# Patient Record
Sex: MALE | Race: WHITE | Employment: FULL TIME | URBAN - METROPOLITAN AREA
[De-identification: names, ages, dates, MRNs, and addresses within clinical notes are randomized per-mention and may not be internally consistent; named-entity substitution may affect disease eponyms.]

---

## 2017-04-27 ENCOUNTER — OFFICE VISIT (OUTPATIENT)
Dept: FAMILY MEDICINE | Facility: CLINIC | Age: 27
End: 2017-04-27
Payer: COMMERCIAL

## 2017-04-27 VITALS
SYSTOLIC BLOOD PRESSURE: 136 MMHG | TEMPERATURE: 100.5 F | HEIGHT: 68 IN | BODY MASS INDEX: 39.86 KG/M2 | HEART RATE: 90 BPM | OXYGEN SATURATION: 96 % | WEIGHT: 263 LBS | DIASTOLIC BLOOD PRESSURE: 85 MMHG

## 2017-04-27 DIAGNOSIS — L05.91 INFECTED PILONIDAL CYST: Primary | ICD-10-CM

## 2017-04-27 PROCEDURE — 99213 OFFICE O/P EST LOW 20 MIN: CPT | Performed by: FAMILY MEDICINE

## 2017-04-27 RX ORDER — HYDROCODONE BITARTRATE AND ACETAMINOPHEN 5; 325 MG/1; MG/1
1-2 TABLET ORAL EVERY 8 HOURS PRN
Qty: 20 TABLET | Refills: 0 | Status: SHIPPED | OUTPATIENT
Start: 2017-04-27 | End: 2017-09-21

## 2017-04-27 RX ORDER — CEPHALEXIN 500 MG/1
500 CAPSULE ORAL 4 TIMES DAILY
Qty: 40 CAPSULE | Refills: 0 | Status: SHIPPED | OUTPATIENT
Start: 2017-04-27 | End: 2017-09-21

## 2017-04-27 NOTE — LETTER
[address] [address]  Phone: [phone]    April 27, 2017        Tawanda MEEK Richard  [address] [facility] [address]          To whom it may concern:    RE: Twaanda MEEK [place]    Patient was seen and treated today at our clinic and missed work.  Patient may return to work 5/1/2017 with the following:  No working or lifting restrictions    Please contact me for questions or concerns.      Sincerely,        Prateek Ford MD

## 2017-04-27 NOTE — MR AVS SNAPSHOT
After Visit Summary   4/27/2017    Tawanda Ramos    MRN: [id number]           Patient Information     Date Of Birth          1990        Visit Information        Provider Department      4/27/2017 5:00 PM Prateek Ford MD [facility]        Today's Diagnoses     Infected pilonidal cyst    -  1       Follow-ups after your visit        Additional Services     GENERAL SURG ADULT REFERRAL       Your provider has referred you to: G: [facility] [phone]   [email or web address]    Please be aware that coverage of these services is subject to the terms and limitations of your health insurance plan.  Call member services at your health plan with any benefit or coverage questions.      Please bring the following with you to your appointment:    (1) Any X-Rays, CTs or MRIs which have been performed.  Contact the facility where they were done to arrange for  prior to your scheduled appointment.   (2) List of current medications   (3) This referral request   (4) Any documents/labs given to you for this referral                  Who to contact     If you have questions or need follow up information about today's clinic visit or your schedule please contact [facility] directly at [phone].  Normal or non-critical lab and imaging results will be communicated to you by MyChart, letter or phone within 4 business days after the clinic has received the results. If you do not hear from us within 7 days, please contact the clinic through MyChart or phone. If you have a critical or abnormal lab result, we will notify you by phone as soon as possible.  Submit refill requests through [employer] or call your pharmacy and they will forward the refill request to us. Please allow 3 business days for your refill to be completed.          Additional Information About Your Visit        MyChart Information      "[employer] lets you send messages to your doctor, view your test results, renew your prescriptions, schedule appointments and more. To sign up, go to [email or web address] . Click on \"Log in\" on the left side of the screen, which will take you to the Welcome page. Then click on \"Sign up Now\" on the right side of the page.     You will be asked to enter the access code listed below, as well as some personal information. Please follow the directions to create your username and password.     Your access code is: [email or web address]  Expires: 2017  5:28 PM     Your access code will  in 90 days. If you need help or a new code, please call your [place] clinic or [phone].        Care EveryWhere ID     This is your Care EveryWhere ID. This could be used by other organizations to access your [place] medical records  [id number]        Your Vitals Were     Pulse Temperature Height Pulse Oximetry BMI (Body Mass Index)       90 100.5  F (38.1  C) (Oral) 5' 8.11\" (1.73 m) 96% 39.86 kg/m2        Blood Pressure from Last 3 Encounters:   17 136/85   16 127/77   16 144/84    Weight from Last 3 Encounters:   17 263 lb (119.3 kg)   16 237 lb 8 oz (107.7 kg)   16 242 lb (109.8 kg)              We Performed the Following     GENERAL SURG ADULT REFERRAL          Today's Medication Changes          These changes are accurate as of: 17  5:28 PM.  If you have any questions, ask your nurse or doctor.               Start taking these medicines.        Dose/Directions    cephALEXin 500 MG capsule   Commonly known as:  KEFLEX   Used for:  Infected pilonidal cyst   Started by:  Prateek Ford MD        Dose:  500 mg   Take 1 capsule (500 mg) by mouth 4 times daily   Quantity:  40 capsule   Refills:  0            Where to get your medicines      These medications were sent to [facility] - [place], [place] - [address]  [address], [place] " [facility] [address]     Phone:  [phone]     cephALEXin 500 MG capsule                Primary Care Provider Office Phone # Fax #    Prateek Ford [phone]       [facility] [address]  [facility] [address]        Thank you!     Thank you for choosing [facility]  for your care. Our goal is always to provide you with excellent care. Hearing back from our patients is one way we can continue to improve our services. Please take a few minutes to complete the written survey that you may receive in the mail after your visit with us. Thank you!             Your Updated Medication List - Protect others around you: Learn how to safely use, store and throw away your medicines at www.disposemymeds.org.          This list is accurate as of: 4/27/17  5:28 PM.  Always use your most recent med list.                   Brand Name Dispense Instructions for use    cephALEXin 500 MG capsule    KEFLEX    40 capsule    Take 1 capsule (500 mg) by mouth 4 times daily       FLUoxetine 40 MG capsule    PROzac    90 capsule    Take 1 capsule (40 mg) by mouth daily       lisinopril 10 MG tablet    PRINIVIL/ZESTRIL    90 tablet    Take 1 tablet (10 mg) by mouth daily

## 2017-04-27 NOTE — PROGRESS NOTES
"  SUBJECTIVE:                                                    Tawanda Ramos is a 26 year old male who presents to clinic today for the following health issues:      Cyst on Tailbone    Present for 2 days 5/1/2017  No drainage   No previous history of same     O: /85 (BP Location: Right arm, Patient Position: Chair, Cuff Size: Adult Regular)  Pulse 90  Temp 100.5  F (38.1  C) (Oral)  Ht 5' 8.11\" (1.73 m)  Wt 263 lb (119.3 kg)  SpO2 96%  BMI 39.86 kg/m2    Patient has an area of redness and tenderness over the proximal intergluteal cleft   No drainage     Not fluctuant   Feels about 2 cm deep     Measures  2x3 cm     (L05.91) Infected pilonidal cyst  (primary encounter diagnosis)  Comment:   Plan: cephALEXin (KEFLEX) 500 MG capsule, GENERAL         SURG ADULT REFERRAL, HYDROcodone-acetaminophen         (NORCO) 5-325 MG per tablet        Follow up if starts to drain  We could I+D this if it became fluctuant or started to drain.  If not gone refer to General surgery           "

## 2017-04-27 NOTE — NURSING NOTE
"Chief Complaint   Patient presents with     Derm Problem     Cyst on [facility]     PHQ-9       Initial /85 (BP Location: Right arm, Patient Position: Chair, Cuff Size: Adult Regular)  Pulse 90  Temp 100.5  F (38.1  C) (Oral)  Ht 5' 8.11\" (1.73 m)  Wt 263 lb (119.3 kg)  SpO2 96%  BMI 39.86 kg/m2 Estimated body mass index is 39.86 kg/(m^2) as calculated from the following:    Height as of this encounter: 5' 8.11\" (1.73 m).    Weight as of this encounter: 263 lb (119.3 kg).  Medication Reconciliation: complete   Tati See RUPALI Graves      "

## 2017-04-28 ASSESSMENT — PATIENT HEALTH QUESTIONNAIRE - PHQ9: SUM OF ALL RESPONSES TO PHQ QUESTIONS 1-9: 3

## 2017-09-21 ENCOUNTER — OFFICE VISIT (OUTPATIENT)
Dept: FAMILY MEDICINE | Facility: CLINIC | Age: 27
End: 2017-09-21
Payer: COMMERCIAL

## 2017-09-21 VITALS
WEIGHT: 275 LBS | DIASTOLIC BLOOD PRESSURE: 93 MMHG | BODY MASS INDEX: 41.68 KG/M2 | OXYGEN SATURATION: 97 % | HEART RATE: 86 BPM | TEMPERATURE: 98.5 F | SYSTOLIC BLOOD PRESSURE: 155 MMHG

## 2017-09-21 DIAGNOSIS — L05.91 INFECTED PILONIDAL CYST: ICD-10-CM

## 2017-09-21 DIAGNOSIS — I10 HYPERTENSION GOAL BP (BLOOD PRESSURE) < 140/90: ICD-10-CM

## 2017-09-21 PROCEDURE — 99213 OFFICE O/P EST LOW 20 MIN: CPT | Performed by: FAMILY MEDICINE

## 2017-09-21 RX ORDER — LISINOPRIL 10 MG/1
10 TABLET ORAL DAILY
Qty: 90 TABLET | Refills: 3 | Status: SHIPPED | OUTPATIENT
Start: 2017-09-21 | End: 2018-01-11

## 2017-09-21 RX ORDER — HYDROCODONE BITARTRATE AND ACETAMINOPHEN 5; 325 MG/1; MG/1
1-2 TABLET ORAL EVERY 8 HOURS PRN
Qty: 30 TABLET | Refills: 0 | Status: SHIPPED | OUTPATIENT
Start: 2017-09-21 | End: 2017-11-20

## 2017-09-21 RX ORDER — CEPHALEXIN 500 MG/1
500 CAPSULE ORAL 4 TIMES DAILY
Qty: 40 CAPSULE | Refills: 0 | Status: SHIPPED | OUTPATIENT
Start: 2017-09-21 | End: 2018-04-09

## 2017-09-21 NOTE — LETTER
[address] [address]  Phone: [phone]  Fax: [phone]    September 21, 2017        Tawanda Ramos  [address] [place] [address]          To whom it may concern:    RE: Tawanda Ramos    Patient was seen and treated today at our clinic.    Please contact me for questions or concerns.      Sincerely,        Prateek Ford MD

## 2017-09-21 NOTE — MR AVS SNAPSHOT
After Visit Summary   9/21/2017    Tawanda Ramos    MRN: [id number]           Patient Information     Date Of Birth          1990        Visit Information        Provider Department      9/21/2017 2:20 PM Prateek Ford MD [facility]        Today's Diagnoses     Infected pilonidal cyst        Hypertension goal BP (blood pressure) < 140/90          Care Instructions      Pilonidal Cyst, Infected (Antibiotic Treatment)  A pilonidal cyst is a swelling that starts under the skin on the sacrum near the tailbone. It may look like a small dimple. It can fill with skin oils, hair, and dead skin cells. It may stay small or grow larger. It may become infected with normal skin bacteria because it often has an opening to the surface.  Causes  The cause of pilonidal cysts has been debated since they were first recognized. A cyst may be present at birth and go unnoticed. Injury, rubbing, or skin irritation may also cause pilonidal cysts. It can also be caused by an ingrown hair. The cause is most likely a combination of these things. Because some injury or irritation can lead to pilonidal cysts, they can be more common in people who sit or drive a lot for work.  Symptoms  A pilonidal cyst may be small and painless. If it becomes inflamed or infected, you may have these symptoms:    Swelling    Irritation or redness    Pain    Drainage  The cyst can swell and drain on its own. The swelling and drainage can come and go.  Treatment  A limited infection can be treated with antibiotics and home care. You have been given antibiotics to treat your infected pilonidal cyst.  Home care  The following guidelines will help you care for your wound at home:    Sit in a tub filled with about 6 inches of hot water. Keep the water hot for 10 to 15 minutes.    Don't squeeze the pilonidal cyst or stick a needle in it to drain it. This will make the infection worse, or spread it.    Cover the cyst  with a pad or something similar to keep it from becoming more irritated, damaged, and painful.  Medicines    Take acetaminophen or ibuprofen for pain, unless you were given a different pain medicine to use. Talk with your doctor before using these medicines if you have chronic liver or kidney disease, or have ever had a stomach ulcer or digestive bleeding. Also talk with your doctor if you are taking blood thinner medicines.    Take the antibiotics that you were prescribed until they are all gone. To make sure the infection is cured, it is important to finish the antibiotics even if the wound looks better.    Use antibiotic cream or ointment if your healthcare provider tells you to.    Preventing future infections  Once this infection has healed, follow these tips to lower the risk for another infection:    Keep the area of the cyst clean by bathing or showering every day.    Don't wear tight-fitting clothing. This will help lessen sweat and irritation of the skin.    You may need surgery to completely remove the cyst if it keeps coming back. The surgery can only be done when the cyst is not infected. Ask your doctor for more information.    Watch for signs of infection listed below so that treatment may be started early.  Follow-up care  Follow up with your healthcare provider, or as advised. Check your wound every day for the signs listed below.  When to seek medical advice  Call your healthcare provider right away if any of these occur:    Pus coming from the cyst    Increasing local pain, redness, or swelling    Fever of 100.4 F (38.0 C) or higher for more than 2 days, or as directed by your healthcare provider  Date Last Reviewed: 12/1/2016 2000-2017 [employer]. [address] [place] [address], [place] [address]. All rights reserved. This information is not intended as a substitute for professional medical care. Always follow your healthcare professional's instructions.                Follow-ups after  "your visit        Who to contact     If you have questions or need follow up information about today's clinic visit or your schedule please contact [facility] directly at [phone].  Normal or non-critical lab and imaging results will be communicated to you by MyChart, letter or phone within 4 business days after the clinic has received the results. If you do not hear from us within 7 days, please contact the clinic through MyChart or phone. If you have a critical or abnormal lab result, we will notify you by phone as soon as possible.  Submit refill requests through [employer] or call your pharmacy and they will forward the refill request to us. Please allow 3 business days for your refill to be completed.          Additional Information About Your Visit        [facility] Information     [employer] lets you send messages to your doctor, view your test results, renew your prescriptions, schedule appointments and more. To sign up, go to [email or web address] . Click on \"Log in\" on the left side of the screen, which will take you to the Welcome page. Then click on \"Sign up Now\" on the right side of the page.     You will be asked to enter the access code listed below, as well as some personal information. Please follow the directions to create your username and password.     Your access code is: [email or web address]  Expires: 2017  2:45 PM     Your access code will  in 90 days. If you need help or a new code, please call your [place] clinic or [phone].        Care EveryWhere ID     This is your Care EveryWhere ID. This could be used by other organizations to access your [place] medical records  [id number]        Your Vitals Were     Pulse Temperature Pulse Oximetry BMI (Body Mass Index)          86 98.5  F (36.9  C) (Oral) 97% 41.68 kg/m2         Blood Pressure from Last 3 Encounters:   17 (!) 155/93   17 136/85   16 127/77    Weight from Last 3 Encounters:   17 " 275 lb (124.7 kg)   04/27/17 263 lb (119.3 kg)   04/11/16 237 lb 8 oz (107.7 kg)              Today, you had the following     No orders found for display         Today's Medication Changes          These changes are accurate as of: 9/21/17  2:45 PM.  If you have any questions, ask your nurse or doctor.               Stop taking these medicines if you haven't already. Please contact your care team if you have questions.     FLUoxetine 40 MG capsule   Commonly known as:  PROzac   Stopped by:  Prateek Ford MD                Where to get your medicines      These medications were sent to [facility] - [place], [place] - [address]  [address], [facility] [address]     Phone:  [phone]     cephALEXin 500 MG capsule    lisinopril 10 MG tablet         Some of these will need a paper prescription and others can be bought over the counter.  Ask your nurse if you have questions.     Bring a paper prescription for each of these medications     HYDROcodone-acetaminophen 5-325 MG per tablet                Primary Care Provider Office Phone # Fax #    Prateek Ford [phone]       [address]  [facility] [place] [place] [address]        Equal Access to Services     CONCHITA LIU AH: Hadii barrett smith hadasho Soomaali, waaxda luqadaha, qaybta kaalmada adeegyada, quique oquendo. So [facility] [phone].    ATENCIÓN: Si habla español, tiene a mckeon disposición servicios gratuitos de asistencia lingüística. Llame al [phone].    We comply with applicable federal civil rights laws and [place] laws. We do not discriminate on the basis of race, color, national origin, age, disability sex, sexual orientation or gender identity.            Thank you!     Thank you for choosing [facility]  for your care. Our goal is always to provide you with excellent care. Hearing back from our patients is one way we can  continue to improve our services. Please take a few minutes to complete the written survey that you may receive in the mail after your visit with us. Thank you!             Your Updated Medication List - Protect others around you: Learn how to safely use, store and throw away your medicines at www.disposemymeds.org.          This list is accurate as of: 9/21/17  2:45 PM.  Always use your most recent med list.                   Brand Name Dispense Instructions for use Diagnosis    cephALEXin 500 MG capsule    KEFLEX    40 capsule    Take 1 capsule (500 mg) by mouth 4 times daily    Infected pilonidal cyst       HYDROcodone-acetaminophen 5-325 MG per tablet    NORCO    30 tablet    Take 1-2 tablets by mouth every 8 hours as needed for moderate to severe pain maximum 6 tablet(s) per day    Infected pilonidal cyst       lisinopril 10 MG tablet    PRINIVIL/ZESTRIL    90 tablet    Take 1 tablet (10 mg) by mouth daily    Hypertension goal BP (blood pressure) < 140/90

## 2017-09-21 NOTE — NURSING NOTE
"Chief Complaint   Patient presents with     Derm Problem     Cyst on tailbone came back from 4/27/17       Initial BP (!) 155/93  Pulse 86  Temp 98.5  F (36.9  C) (Oral)  Wt 275 lb (124.7 kg)  SpO2 97%  BMI 41.68 kg/m2 Estimated body mass index is 41.68 kg/(m^2) as calculated from the following:    Height as of 4/27/17: 5' 8.11\" (1.73 m).    Weight as of this encounter: 275 lb (124.7 kg).  Medication Reconciliation: complete   Deborah Mosqueda MA      "

## 2017-09-21 NOTE — PROGRESS NOTES
SUBJECTIVE:   Tawanda Ramos is a 26 year old male who presents to clinic today for the following health issues:      Cyst on tailbone came back from 4/27/17    Patient has not had a fever   He has had a little drainage     It is painful   He had started to noticed it less than a week ago       O: BP (!) 155/93  Pulse 86  Temp 98.5  F (36.9  C) (Oral)  Wt 275 lb (124.7 kg)  SpO2 97%  BMI 41.68 kg/m2     Pain at the intergluteal cleft   Swelling proximally   No drainage       ICD-10-CM    1. Infected pilonidal cyst L05.91 HYDROcodone-acetaminophen (NORCO) 5-325 MG per tablet     cephALEXin (KEFLEX) 500 MG capsule   2. Hypertension goal BP (blood pressure) < 140/90 I10 lisinopril (PRINIVIL/ZESTRIL) 10 MG tablet

## 2017-09-21 NOTE — PATIENT INSTRUCTIONS
Pilonidal Cyst, Infected (Antibiotic Treatment)  A pilonidal cyst is a swelling that starts under the skin on the sacrum near the tailbone. It may look like a small dimple. It can fill with skin oils, hair, and dead skin cells. It may stay small or grow larger. It may become infected with normal skin bacteria because it often has an opening to the surface.  Causes  The cause of pilonidal cysts has been debated since they were first recognized. A cyst may be present at birth and go unnoticed. Injury, rubbing, or skin irritation may also cause pilonidal cysts. It can also be caused by an ingrown hair. The cause is most likely a combination of these things. Because some injury or irritation can lead to pilonidal cysts, they can be more common in people who sit or drive a lot for work.  Symptoms  A pilonidal cyst may be small and painless. If it becomes inflamed or infected, you may have these symptoms:    Swelling    Irritation or redness    Pain    Drainage  The cyst can swell and drain on its own. The swelling and drainage can come and go.  Treatment  A limited infection can be treated with antibiotics and home care. You have been given antibiotics to treat your infected pilonidal cyst.  Home care  The following guidelines will help you care for your wound at home:    Sit in a tub filled with about 6 inches of hot water. Keep the water hot for 10 to 15 minutes.    Don't squeeze the pilonidal cyst or stick a needle in it to drain it. This will make the infection worse, or spread it.    Cover the cyst with a pad or something similar to keep it from becoming more irritated, damaged, and painful.  Medicines    Take acetaminophen or ibuprofen for pain, unless you were given a different pain medicine to use. Talk with your doctor before using these medicines if you have chronic liver or kidney disease, or have ever had a stomach ulcer or digestive bleeding. Also talk with your doctor if you are taking blood thinner  medicines.    Take the antibiotics that you were prescribed until they are all gone. To make sure the infection is cured, it is important to finish the antibiotics even if the wound looks better.    Use antibiotic cream or ointment if your healthcare provider tells you to.    Preventing future infections  Once this infection has healed, follow these tips to lower the risk for another infection:    Keep the area of the cyst clean by bathing or showering every day.    Don't wear tight-fitting clothing. This will help lessen sweat and irritation of the skin.    You may need surgery to completely remove the cyst if it keeps coming back. The surgery can only be done when the cyst is not infected. Ask your doctor for more information.    Watch for signs of infection listed below so that treatment may be started early.  Follow-up care  Follow up with your healthcare provider, or as advised. Check your wound every day for the signs listed below.  When to seek medical advice  Call your healthcare provider right away if any of these occur:    Pus coming from the cyst    Increasing local pain, redness, or swelling    Fever of 100.4 F (38.0 C) or higher for more than 2 days, or as directed by your healthcare provider  Date Last Reviewed: 12/1/2016 2000-2017 [employer]. [address] [place] [address], [place] [address]. All rights reserved. This information is not intended as a substitute for professional medical care. Always follow your healthcare professional's instructions.

## 2017-11-20 ENCOUNTER — OFFICE VISIT (OUTPATIENT)
Dept: FAMILY MEDICINE | Facility: CLINIC | Age: 27
End: 2017-11-20
Payer: COMMERCIAL

## 2017-11-20 VITALS
DIASTOLIC BLOOD PRESSURE: 94 MMHG | HEART RATE: 104 BPM | BODY MASS INDEX: 40.16 KG/M2 | WEIGHT: 265 LBS | SYSTOLIC BLOOD PRESSURE: 155 MMHG | TEMPERATURE: 98.4 F | OXYGEN SATURATION: 99 %

## 2017-11-20 DIAGNOSIS — F41.1 GAD (GENERALIZED ANXIETY DISORDER): Primary | ICD-10-CM

## 2017-11-20 PROCEDURE — 99213 OFFICE O/P EST LOW 20 MIN: CPT | Performed by: FAMILY MEDICINE

## 2017-11-20 RX ORDER — ALPRAZOLAM 0.25 MG
0.25 TABLET ORAL 3 TIMES DAILY PRN
Qty: 30 TABLET | Refills: 0 | Status: SHIPPED | OUTPATIENT
Start: 2017-11-20 | End: 2017-11-29

## 2017-11-20 ASSESSMENT — ANXIETY QUESTIONNAIRES
6. BECOMING EASILY ANNOYED OR IRRITABLE: SEVERAL DAYS
2. NOT BEING ABLE TO STOP OR CONTROL WORRYING: SEVERAL DAYS
GAD7 TOTAL SCORE: 9
1. FEELING NERVOUS, ANXIOUS, OR ON EDGE: MORE THAN HALF THE DAYS
3. WORRYING TOO MUCH ABOUT DIFFERENT THINGS: MORE THAN HALF THE DAYS
IF YOU CHECKED OFF ANY PROBLEMS ON THIS QUESTIONNAIRE, HOW DIFFICULT HAVE THESE PROBLEMS MADE IT FOR YOU TO DO YOUR WORK, TAKE CARE OF THINGS AT HOME, OR GET ALONG WITH OTHER PEOPLE: VERY DIFFICULT
7. FEELING AFRAID AS IF SOMETHING AWFUL MIGHT HAPPEN: NOT AT ALL
5. BEING SO RESTLESS THAT IT IS HARD TO SIT STILL: SEVERAL DAYS

## 2017-11-20 ASSESSMENT — PATIENT HEALTH QUESTIONNAIRE - PHQ9
5. POOR APPETITE OR OVEREATING: MORE THAN HALF THE DAYS
SUM OF ALL RESPONSES TO PHQ QUESTIONS 1-9: 5

## 2017-11-20 NOTE — MR AVS SNAPSHOT
After Visit Summary   11/20/2017    Tawanda Ramos    MRN: [id number]           Patient Information     Date Of Birth          1990        Visit Information        Provider Department      11/20/2017 3:20 PM Prateek Ford MD [facility]        Today's Diagnoses     SHIVANI (generalized anxiety disorder)    -  1       Follow-ups after your visit        Additional Services     MENTAL HEALTH REFERRAL  - Adult; Outpatient Treatment; Individual/Couples/Family/Group Therapy/Health Psychology; [facility]: [facility] [phone]; The scheduling team will contact you to schedule your appointment.  If you have any ...       All scheduling is subject to the client's specific insurance plan & benefits, provider/location availability, and provider clinical specialities.  Please arrive 15 minutes early for your first appointment and bring your completed paperwork.    Please be aware that coverage of these services is subject to the terms and limitations of your health insurance plan.  Call member services at your health plan with any benefit or coverage questions.                            Who to contact     If you have questions or need follow up information about today's clinic visit or your schedule please contact [facility] directly at [phone].  Normal or non-critical lab and imaging results will be communicated to you by MyChart, letter or phone within 4 business days after the clinic has received the results. If you do not hear from us within 7 days, please contact the clinic through MyChart or phone. If you have a critical or abnormal lab result, we will notify you by phone as soon as possible.  Submit refill requests through [employer] or call your pharmacy and they will forward the refill request to us. Please allow 3 business days for your refill to be completed.          Additional Information About Your Visit        MyChart  "Information     [employer] lets you send messages to your doctor, view your test results, renew your prescriptions, schedule appointments and more. To sign up, go to [email or web address] . Click on \"Log in\" on the left side of the screen, which will take you to the Welcome page. Then click on \"Sign up Now\" on the right side of the page.     You will be asked to enter the access code listed below, as well as some personal information. Please follow the directions to create your username and password.     Your access code is: [email or web address]  Expires: 2017  1:45 PM     Your access code will  in 90 days. If you need help or a new code, please call your [facility] clinic or [phone].        Care EveryWhere ID     This is your Care EveryWhere ID. This could be used by other organizations to access your [facility] medical records  [id number]        Your Vitals Were     Pulse Temperature Pulse Oximetry BMI (Body Mass Index)          104 98.4  F (36.9  C) (Oral) 99% 40.16 kg/m2         Blood Pressure from Last 3 Encounters:   17 (!) 155/94   17 (!) 155/93   17 136/85    Weight from Last 3 Encounters:   17 265 lb (120.2 kg)   17 275 lb (124.7 kg)   17 263 lb (119.3 kg)              We Performed the Following     MENTAL HEALTH REFERRAL  - Adult; Outpatient Treatment; Individual/Couples/Family/Group Therapy/Health Psychology; [facility] [phone]; The scheduling team will contact you to schedule your appointment.  If you have any ...          Today's Medication Changes          These changes are accurate as of: 17  4:14 PM.  If you have any questions, ask your nurse or doctor.               Start taking these medicines.        Dose/Directions    ALPRAZolam 0.25 MG tablet   Commonly known as:  XANAX   Used for:  SHIVANI (generalized anxiety disorder)   Started by:  Prateek Ford MD        Dose:  0.25 mg   Take 1 tablet (0.25 mg) by mouth 3 times " daily as needed for anxiety   Quantity:  30 tablet   Refills:  0            Where to get your medicines      Some of these will need a paper prescription and others can be bought over the counter.  Ask your nurse if you have questions.     Bring a paper prescription for each of these medications     ALPRAZolam 0.25 MG tablet                Primary Care Provider Office Phone # Fax #    Prateek Ford [phone]       [address] [facility] [address]        Equal Access to Services     CONCHITA LIU : Hadii aad ku hadasho Soomaali, waaxda luqadaha, qaybta kaalmada adeegyada, waxay idiin hayaan adeeg kharash laion . So [facility] [phone].    ATENCIÓN: Si habla español, tiene a mckeon disposición servicios gratuitos de asistencia lingüística. [facility] [phone].    We comply with applicable federal civil rights laws and [place] laws. We do not discriminate on the basis of race, color, national origin, age, disability, sex, sexual orientation, or gender identity.            Thank you!     Thank you for choosing [facility]  for your care. Our goal is always to provide you with excellent care. Hearing back from our patients is one way we can continue to improve our services. Please take a few minutes to complete the written survey that you may receive in the mail after your visit with us. Thank you!             Your Updated Medication List - Protect others around you: Learn how to safely use, store and throw away your medicines at www.disposemymeds.org.          This list is accurate as of: 11/20/17  4:14 PM.  Always use your most recent med list.                   Brand Name Dispense Instructions for use Diagnosis    ALPRAZolam 0.25 MG tablet    XANAX    30 tablet    Take 1 tablet (0.25 mg) by mouth 3 times daily as needed for anxiety    SHIVANI (generalized anxiety disorder)       cephALEXin 500 MG capsule    KEFLEX    40 capsule    Take 1 capsule (500 mg) by  mouth 4 times daily    Infected pilonidal cyst       lisinopril 10 MG tablet    PRINIVIL/ZESTRIL    90 tablet    Take 1 tablet (10 mg) by mouth daily    Hypertension goal BP (blood pressure) < 140/90

## 2017-11-20 NOTE — PROGRESS NOTES
SUBJECTIVE:   Tawanda Ramos is a 26 year old male who presents to clinic today for the following health issues:      Abnormal Mood Symptoms  Onset: Last couple of months    Description:   Depression: YES  Anxiety: YES    Accompanying Signs & Symptoms:  Still participating in activities that you used to enjoy: YES    Fatigue: YES  Irritability: no  Difficulty concentrating: YES- sometimes  Changes in appetite: no  Problems with sleep: YES  Heart racing/beating fast : no  Thoughts of hurting yourself or others: none    History:   Recent stress: YES- just general but nothing big  Prior depression hospitalization: None  Family history of depression: YES  Family history of anxiety: YES    Precipitating factors:   Alcohol/drug use: Alcohol oscaasionaly    Alleviating factors: None      Will be restarting classes   He is working on degree   He stopped his classes 3.5 years     He has had anxiety and depression   He does not feel depressed     He is not doing any therapies         Therapies Tried and outcome: None    Patient states his depression and anxiety is coming back.    He is not sure what is causing the anxiety but we identify his going back to school   No counseling in the past       O: BP (!) 155/94 (BP Location: Left arm, Patient Position: Chair, Cuff Size: Adult Regular)  Pulse 104  Temp 98.4  F (36.9  C) (Oral)  Wt 265 lb (120.2 kg)  SpO2 99%  BMI 40.16 kg/m2    Patient does not look depressed   He interacts well   No psychomotor agitation or retardation     Dressed appropriately   Oriented   Normal thought processes       ICD-10-CM    1. SHIVANI (generalized anxiety disorder) F41.1 ALPRAZolam (XANAX) 0.25 MG tablet     MENTAL HEALTH REFERRAL  - Adult; Outpatient Treatment; Individual/Couples/Family/Group Therapy/Health Psychology; [facility]: [facility] [phone]; The scheduling team will contact you to schedule your appointment.  If you have any ...     Discussed relaxation treatment and  CBT or other counseling

## 2017-11-20 NOTE — NURSING NOTE
"Chief Complaint   Patient presents with     Anxiety     Health Maintenance     PHQ-9, DAP, BMP       Initial BP (!) 155/94 (BP Location: Left arm, Patient Position: Chair, Cuff Size: Adult Regular)  Pulse 104  Temp 98.4  F (36.9  C) (Oral)  Wt 265 lb (120.2 kg)  SpO2 99%  BMI 40.16 kg/m2 Estimated body mass index is 40.16 kg/(m^2) as calculated from the following:    Height as of 4/27/17: 5' 8.11\" (1.73 m).    Weight as of this encounter: 265 lb (120.2 kg).  Medication Reconciliation: complete   Tati See RUPALI Graves      "

## 2017-11-21 ASSESSMENT — ANXIETY QUESTIONNAIRES: GAD7 TOTAL SCORE: 9

## 2017-11-29 DIAGNOSIS — F41.1 GAD (GENERALIZED ANXIETY DISORDER): ICD-10-CM

## 2017-11-29 NOTE — TELEPHONE ENCOUNTER
Requested Prescriptions   Pending Prescriptions Disp Refills     ALPRAZolam (XANAX) 0.25 MG tablet 30 tablet 0     Sig: Take 1 tablet (0.25 mg) by mouth 3 times daily as needed for anxiety    There is no refill protocol information for this order      Routing refill request to provider for review/approval because:  Drug not on the [facility] refill protocol Wendy Courtney RN-BSN

## 2017-11-30 RX ORDER — ALPRAZOLAM 0.25 MG
0.25 TABLET ORAL 3 TIMES DAILY PRN
Qty: 30 TABLET | Refills: 0 | Status: SHIPPED | OUTPATIENT
Start: 2017-11-30 | End: 2018-01-11

## 2018-01-11 ENCOUNTER — OFFICE VISIT (OUTPATIENT)
Dept: FAMILY MEDICINE | Facility: CLINIC | Age: 28
End: 2018-01-11
Payer: COMMERCIAL

## 2018-01-11 VITALS
TEMPERATURE: 98.7 F | OXYGEN SATURATION: 96 % | BODY MASS INDEX: 38.21 KG/M2 | HEIGHT: 69 IN | DIASTOLIC BLOOD PRESSURE: 86 MMHG | WEIGHT: 258 LBS | SYSTOLIC BLOOD PRESSURE: 136 MMHG | HEART RATE: 79 BPM

## 2018-01-11 DIAGNOSIS — F32.5 MAJOR DEPRESSION IN COMPLETE REMISSION (H): ICD-10-CM

## 2018-01-11 DIAGNOSIS — F41.1 GAD (GENERALIZED ANXIETY DISORDER): Primary | ICD-10-CM

## 2018-01-11 DIAGNOSIS — I10 HYPERTENSION GOAL BP (BLOOD PRESSURE) < 140/90: ICD-10-CM

## 2018-01-11 PROCEDURE — 99213 OFFICE O/P EST LOW 20 MIN: CPT | Performed by: FAMILY MEDICINE

## 2018-01-11 RX ORDER — LISINOPRIL 10 MG/1
10 TABLET ORAL DAILY
Qty: 90 TABLET | Refills: 3 | Status: SHIPPED | OUTPATIENT
Start: 2018-01-11 | End: 2018-10-22

## 2018-01-11 RX ORDER — ALPRAZOLAM 0.25 MG
0.25 TABLET ORAL 3 TIMES DAILY PRN
Qty: 60 TABLET | Refills: 0 | Status: SHIPPED | OUTPATIENT
Start: 2018-01-11 | End: 2018-02-19

## 2018-01-11 RX ORDER — BUSPIRONE HYDROCHLORIDE 5 MG/1
TABLET ORAL
Qty: 150 TABLET | Refills: 0 | Status: SHIPPED | OUTPATIENT
Start: 2018-01-11 | End: 2018-02-19

## 2018-01-11 ASSESSMENT — PATIENT HEALTH QUESTIONNAIRE - PHQ9
SUM OF ALL RESPONSES TO PHQ QUESTIONS 1-9: 3
5. POOR APPETITE OR OVEREATING: MORE THAN HALF THE DAYS

## 2018-01-11 ASSESSMENT — ANXIETY QUESTIONNAIRES
6. BECOMING EASILY ANNOYED OR IRRITABLE: SEVERAL DAYS
7. FEELING AFRAID AS IF SOMETHING AWFUL MIGHT HAPPEN: NOT AT ALL
GAD7 TOTAL SCORE: 6
3. WORRYING TOO MUCH ABOUT DIFFERENT THINGS: NOT AT ALL
1. FEELING NERVOUS, ANXIOUS, OR ON EDGE: MORE THAN HALF THE DAYS
IF YOU CHECKED OFF ANY PROBLEMS ON THIS QUESTIONNAIRE, HOW DIFFICULT HAVE THESE PROBLEMS MADE IT FOR YOU TO DO YOUR WORK, TAKE CARE OF THINGS AT HOME, OR GET ALONG WITH OTHER PEOPLE: SOMEWHAT DIFFICULT
2. NOT BEING ABLE TO STOP OR CONTROL WORRYING: NOT AT ALL
5. BEING SO RESTLESS THAT IT IS HARD TO SIT STILL: SEVERAL DAYS

## 2018-01-11 NOTE — PROGRESS NOTES
"  SUBJECTIVE:   Tawanda Ramos is a 27 year old male who presents to clinic today for the following health issues:      Anxiety Follow-Up    Status since last visit: Improved with the medication    Other associated symptoms:Trouble sleeping    Complicating factors:   Significant life event: No   Current substance abuse: None  Depression symptoms: No  SHIVANI-7 SCORE 7/9/2014 11/20/2017   Total Score 11 -   Total Score - 9       GAD7        Patient has been using the xanax about 3 times a week   Still needs it   When he stopped it symptoms were back     He has thought about counseling   He does not want to do this due to finances     Trouble sleeping         Amount of exercise or physical activity: Active at work    Problems taking medications regularly: No    Medication side effects: none    Diet: regular (no restrictions)    O' /86 (BP Location: Right arm, Patient Position: Chair, Cuff Size: Adult Large)  Pulse 79  Temp 98.7  F (37.1  C) (Oral)  Ht 5' 8.5\" (1.74 m)  Wt 258 lb (117 kg)  SpO2 96%  BMI 38.65 kg/m2    SHIVANI- 4  PHQ-9 -\    Alert   Oriented   Well dressed   Appropriate for whether   No hallucinations   Interactive   No psychomotor activation       Current Outpatient Prescriptions on File Prior to Visit:  cephALEXin (KEFLEX) 500 MG capsule Take 1 capsule (500 mg) by mouth 4 times daily (Patient not taking: Reported on 11/20/2017)   [DISCONTINUED] lisinopril (PRINIVIL/ZESTRIL) 10 MG tablet Take 1 tablet (10 mg) by mouth daily     No current facility-administered medications on file prior to visit.     History reviewed. No pertinent past medical history.    History reviewed. No pertinent surgical history.    Family History   Problem Relation Age of Onset     Asthma Mother      Hypertension Father      CEREBROVASCULAR DISEASE Maternal Grandmother      Arthritis Maternal Grandmother      Prostate Cancer Maternal Grandfather      Arthritis Maternal Grandfather      CANCER Maternal Grandfather      " DIABETES Paternal Grandmother      CEREBROVASCULAR DISEASE Paternal Grandfather      CANCER Paternal Grandfather      Hypertension Brother        Social History   Substance Use Topics     Smoking status: Never Smoker     Smokeless tobacco: Never Used     Alcohol use Yes      Comment: social             Reviewed and updated as needed this visit by clinical staffTobacco  Allergies  Meds  Med Hx  Surg Hx  Fam Hx  Soc Hx      Reviewed and updated as needed this visit by Provider         ICD-10-CM    1. SHIVANI (generalized anxiety disorder) F41.1 ALPRAZolam (XANAX) 0.25 MG tablet     busPIRone (BUSPAR) 5 MG tablet   2. Major depression in complete remission (H) F32.5    3. Hypertension goal BP (blood pressure) < 140/90 I10 lisinopril (PRINIVIL/ZESTRIL) 10 MG tablet     Recheck anxiety in 2 months

## 2018-01-11 NOTE — MR AVS SNAPSHOT
"              After Visit Summary   1/11/2018    Tawanda Ramos    MRN: [id number]           Patient Information     Date Of Birth          1990        Visit Information        Provider Department      1/11/2018 10:00 AM Prateek Ford MD [facility]        Today's Diagnoses     Major depression in complete remission (H)    -  1    SHIVANI (generalized anxiety disorder)        Hypertension goal BP (blood pressure) < 140/90           Follow-ups after your visit        Follow-up notes from your care team     Return in about 8 weeks (around 3/8/2018), or anxiety .      Who to contact     If you have questions or need follow up information about today's clinic visit or your schedule please contact [facility] directly at [phone].  Normal or non-critical lab and imaging results will be communicated to you by MyChart, letter or phone within 4 business days after the clinic has received the results. If you do not hear from us within 7 days, please contact the clinic through MyChart or phone. If you have a critical or abnormal lab result, we will notify you by phone as soon as possible.  Submit refill requests through [facility] or call your pharmacy and they will forward the refill request to us. Please allow 3 business days for your refill to be completed.          Additional Information About Your Visit        MyChart Information     [facility] lets you send messages to your doctor, view your test results, renew your prescriptions, schedule appointments and more. To sign up, go to [email or web address] . Click on \"Log in\" on the left side of the screen, which will take you to the Welcome page. Then click on \"Sign up Now\" on the right side of the page.     You will be asked to enter the access code listed below, as well as some personal information. Please follow the directions to create your username and password.     Your access code is: [email or web address]  Expires: " "2018 10:29 AM     Your access code will  in 90 days. If you need help or a new code, please call your [facility] clinic or [phone].        Care EveryWhere ID     This is your Care EveryWhere ID. This could be used by other organizations to access your [facility] medical records  [id number]        Your Vitals Were     Pulse Temperature Height Pulse Oximetry BMI (Body Mass Index)       79 98.7  F (37.1  C) (Oral) 5' 8.5\" (1.74 m) 96% 38.65 kg/m2        Blood Pressure from Last 3 Encounters:   18 136/86   17 (!) 155/94   17 (!) 155/93    Weight from Last 3 Encounters:   18 258 lb (117 kg)   17 265 lb (120.2 kg)   17 275 lb (124.7 kg)              Today, you had the following     No orders found for display         Today's Medication Changes          These changes are accurate as of: 18 10:29 AM.  If you have any questions, ask your nurse or doctor.               Start taking these medicines.        Dose/Directions    busPIRone 5 MG tablet   Commonly known as:  BUSPAR   Used for:  SHIVANI (generalized anxiety disorder)   Started by:  Prateek Ford MD        Start at 5 mg twice daily for 3 days, then 7.5 mg (1.5 tabs) twice daily for 3 days, then 10 mg (2 tabs) twice daily for 3 days, then 12.5 mg (2.5 tabs) twice daily for 3 days, then 15 mg (3 tabs) twice daily and stay at that dose   Quantity:  150 tablet   Refills:  0            Where to get your medicines      These medications were sent to [facility] - [place], [place] - [address]  [address], [facility] [address]     Phone:  [phone]     lisinopril 10 MG tablet         Some of these will need a paper prescription and others can be bought over the counter.  Ask your nurse if you have questions.     Bring a paper prescription for each of these medications     ALPRAZolam 0.25 MG tablet    busPIRone 5 MG tablet                Primary Care Provider " Office Phone # Fax #    Prateek Ford [phone] [address] [facility] [address]        Equal Access to Services     CONCHITA LIU : Hadmarjorie barrett smith irmao Somarkali, waaxda luqadaha, qaybta kaalmada adeegyada, quique huizar laKadennicolle oquendo. So [facility] [phone].    ATENCIÓN: Si habla español, tiene a mckeon disposición servicios gratuitos de asistencia lingüística. Llame al [phone].    We comply with applicable federal civil rights laws and [place] laws. We do not discriminate on the basis of race, color, national origin, age, disability, sex, sexual orientation, or gender identity.            Thank you!     Thank you for choosing [facility]  for your care. Our goal is always to provide you with excellent care. Hearing back from our patients is one way we can continue to improve our services. Please take a few minutes to complete the written survey that you may receive in the mail after your visit with us. Thank you!             Your Updated Medication List - Protect others around you: Learn how to safely use, store and throw away your medicines at www.disposemymeds.org.          This list is accurate as of: 1/11/18 10:29 AM.  Always use your most recent med list.                   Brand Name Dispense Instructions for use Diagnosis    ALPRAZolam 0.25 MG tablet    XANAX    60 tablet    Take 1 tablet (0.25 mg) by mouth 3 times daily as needed for anxiety    SHIVANI (generalized anxiety disorder)       busPIRone 5 MG tablet    BUSPAR    150 tablet    Start at 5 mg twice daily for 3 days, then 7.5 mg (1.5 tabs) twice daily for 3 days, then 10 mg (2 tabs) twice daily for 3 days, then 12.5 mg (2.5 tabs) twice daily for 3 days, then 15 mg (3 tabs) twice daily and stay at that dose    SHIVANI (generalized anxiety disorder)       cephALEXin 500 MG capsule    KEFLEX    40 capsule    Take 1 capsule (500 mg) by mouth 4 times daily    Infected pilonidal  cyst       lisinopril 10 MG tablet    PRINIVIL/ZESTRIL    90 tablet    Take 1 tablet (10 mg) by mouth daily    Hypertension goal BP (blood pressure) < 140/90

## 2018-01-11 NOTE — NURSING NOTE
"Chief Complaint   Patient presents with     Anxiety     Health Maintenance     DAP, BMP       Initial /86 (BP Location: Right arm, Patient Position: Chair, Cuff Size: Adult Large)  Pulse 79  Temp 98.7  F (37.1  C) (Oral)  Ht 5' 8.5\" (1.74 m)  Wt 258 lb (117 kg)  SpO2 96%  BMI 38.65 kg/m2 Estimated body mass index is 38.65 kg/(m^2) as calculated from the following:    Height as of this encounter: 5' 8.5\" (1.74 m).    Weight as of this encounter: 258 lb (117 kg).  Medication Reconciliation: complete   Tati See RUPALI Graves      "

## 2018-01-12 ASSESSMENT — ANXIETY QUESTIONNAIRES: GAD7 TOTAL SCORE: 6

## 2018-02-19 DIAGNOSIS — F41.1 GAD (GENERALIZED ANXIETY DISORDER): ICD-10-CM

## 2018-02-19 NOTE — TELEPHONE ENCOUNTER
Reason for Call:  Medication or medication refill:    Do you use a [facility]?  Name of the pharmacy and phone number for the current request:  [facility]     Name of the medication requested: busPIRone (BUSPAR) 5 MG tablet,ALPRAZolam (XANAX) 0.25 MG tablet    Other request: Patient believes PCP was going to change the tablet dosage so he doesn't need to take 6 tablets daily now that he's titrated up to a higher dose.    Can we leave a detailed message on this number? YES    Phone number patient can be reached at: Home number on file [phone] (home)    Best Time: any    Call taken on 2/19/2018 at 9:43 AM by Stephy Parmar

## 2018-02-19 NOTE — TELEPHONE ENCOUNTER
"Requested Prescriptions   Pending Prescriptions Disp Refills     ALPRAZolam (XANAX) 0.25 MG tablet 60 tablet 0          Last Written Prescription Date:  1-11-18  Last Fill Quantity: 60,   # refills: 0  Last Office Visit: 1-11-18  Future Office visit:       Routing refill request to provider for review/approval because:  Drug not on the [facility], [facility] or [facility] refill protocol or controlled substance   Sig: Take 1 tablet (0.25 mg) by mouth 3 times daily as needed for anxiety    There is no refill protocol information for this order        busPIRone (BUSPAR) 5 MG tablet 150 tablet 0    Last Written Prescription Date:  1-11-18  Last Fill Quantity: 150,  # refills: 0   Last office visit: 1/11/2018 with prescribing provider:     Future Office Visit:     Sig: Start at 5 mg twice daily for 3 days, then 7.5 mg (1.5 tabs) twice daily for 3 days, then 10 mg (2 tabs) twice daily for 3 days, then 12.5 mg (2.5 tabs) twice daily for 3 days, then 15 mg (3 tabs) twice daily and stay at that dose    Atypical Antidepressants Protocol Passed    2/19/2018  9:45 AM       Passed - Recent or future visit with authorizing provider's specialty    Patient had office visit in the last year or has a visit in the next 30 days with authorizing provider.  See \"Patient Info\" tab in inbasket, or \"Choose Columns\" in Meds & Orders section of the refill encounter.            Passed - Patient is age 18 or older          "

## 2018-02-20 RX ORDER — ALPRAZOLAM 0.25 MG
0.25 TABLET ORAL 3 TIMES DAILY PRN
Qty: 60 TABLET | Refills: 0 | Status: SHIPPED | OUTPATIENT
Start: 2018-02-20 | End: 2018-04-09

## 2018-02-20 RX ORDER — BUSPIRONE HYDROCHLORIDE 5 MG/1
TABLET ORAL
Qty: 150 TABLET | Refills: 0 | Status: SHIPPED | OUTPATIENT
Start: 2018-02-20 | End: 2018-04-09

## 2018-02-21 NOTE — TELEPHONE ENCOUNTER
Prescription of busPIRone (BUSPAR) 5 MG tablet was faxed to pharmacy.  Prescription of ALPRAZolam (XANAX) 0.25 MG tablet was faxed to pharmacy.  Left detailed message, informing patient.

## 2018-04-09 ENCOUNTER — OFFICE VISIT (OUTPATIENT)
Dept: FAMILY MEDICINE | Facility: CLINIC | Age: 28
End: 2018-04-09
Payer: COMMERCIAL

## 2018-04-09 VITALS
HEART RATE: 91 BPM | WEIGHT: 263 LBS | TEMPERATURE: 98.2 F | OXYGEN SATURATION: 97 % | SYSTOLIC BLOOD PRESSURE: 139 MMHG | DIASTOLIC BLOOD PRESSURE: 87 MMHG | BODY MASS INDEX: 39.4 KG/M2

## 2018-04-09 DIAGNOSIS — F41.1 GAD (GENERALIZED ANXIETY DISORDER): ICD-10-CM

## 2018-04-09 PROCEDURE — 99213 OFFICE O/P EST LOW 20 MIN: CPT | Performed by: FAMILY MEDICINE

## 2018-04-09 RX ORDER — ALPRAZOLAM 0.25 MG
0.25 TABLET ORAL 3 TIMES DAILY PRN
Qty: 60 TABLET | Refills: 1 | Status: SHIPPED | OUTPATIENT
Start: 2018-04-09 | End: 2018-06-18

## 2018-04-09 ASSESSMENT — ANXIETY QUESTIONNAIRES
7. FEELING AFRAID AS IF SOMETHING AWFUL MIGHT HAPPEN: NOT AT ALL
6. BECOMING EASILY ANNOYED OR IRRITABLE: SEVERAL DAYS
2. NOT BEING ABLE TO STOP OR CONTROL WORRYING: NOT AT ALL
GAD7 TOTAL SCORE: 6
5. BEING SO RESTLESS THAT IT IS HARD TO SIT STILL: SEVERAL DAYS
1. FEELING NERVOUS, ANXIOUS, OR ON EDGE: SEVERAL DAYS
3. WORRYING TOO MUCH ABOUT DIFFERENT THINGS: SEVERAL DAYS
IF YOU CHECKED OFF ANY PROBLEMS ON THIS QUESTIONNAIRE, HOW DIFFICULT HAVE THESE PROBLEMS MADE IT FOR YOU TO DO YOUR WORK, TAKE CARE OF THINGS AT HOME, OR GET ALONG WITH OTHER PEOPLE: SOMEWHAT DIFFICULT

## 2018-04-09 ASSESSMENT — PATIENT HEALTH QUESTIONNAIRE - PHQ9: 5. POOR APPETITE OR OVEREATING: MORE THAN HALF THE DAYS

## 2018-04-09 NOTE — PROGRESS NOTES
SUBJECTIVE:   Tawanda Ramos is a 27 year old male who presents to clinic today for the following health issues:      Anxiety follow up    Tried Buspar   Dizziness did not work   Tried it for 2 months   Had trouble sleeping     Xanax effective   Takes this is effective     Patient voices concerns about addictive properties of drugs like xanax   He is using it 2-3 times a week     No side effects from xanax   He feels he does not had time for counseling   He has not tried any otc relaxation     O;/87 (BP Location: Left arm, Patient Position: Sitting, Cuff Size: Adult Large)  Pulse 91  Temp 98.2  F (36.8  C) (Oral)  Wt 263 lb (119.3 kg)  SpO2 97%  BMI 39.4 kg/m2    Patient appears relaxed   Alert and oriented   Well dressed   Well groomed       ICD-10-CM    1. SHIVANI (generalized anxiety disorder) F41.1 ALPRAZolam (XANAX) 0.25 MG tablet     Ok with renewal of xanax   Recheck in 3 months and reassess   Try self help relaxation/yoga/breathing     Consider referral for counseling , which I encouraged     Reviewed and updated as needed this visit by clinical staff       Reviewed and updated as needed this visit by Provider

## 2018-04-09 NOTE — MR AVS SNAPSHOT
"              After Visit Summary   2018    Tawanda Ramos    MRN: [id number]           Patient Information     Date Of Birth          1990        Visit Information        Provider Department      2018 5:00 PM Prateek Ford MD [facility]        Today's Diagnoses     SHIVANI (generalized anxiety disorder)           Follow-ups after your visit        Who to contact     If you have questions or need follow up information about today's clinic visit or your schedule please contact [facility] directly at [phone].  Normal or non-critical lab and imaging results will be communicated to you by Offermaticahart, letter or phone within 4 business days after the clinic has received the results. If you do not hear from us within 7 days, please contact the clinic through Offermaticahart or phone. If you have a critical or abnormal lab result, we will notify you by phone as soon as possible.  Submit refill requests through edupristine or call your pharmacy and they will forward the refill request to us. Please allow 3 business days for your refill to be completed.          Additional Information About Your Visit        MyChart Information     edupristine lets you send messages to your doctor, view your test results, renew your prescriptions, schedule appointments and more. To sign up, go to [email or web address] . Click on \"Log in\" on the left side of the screen, which will take you to the Welcome page. Then click on \"Sign up Now\" on the right side of the page.     You will be asked to enter the access code listed below, as well as some personal information. Please follow the directions to create your username and password.     Your access code is: [email or web address]  Expires: 2018 11:29 AM     Your access code will  in 90 days. If you need help or a new code, please call your [facility] or [phone].        Care EveryWhere ID     This is your Care EveryWhere ID. This " could be used by other organizations to access your [facility] medical records  [id number]        Your Vitals Were     Pulse Temperature Pulse Oximetry BMI (Body Mass Index)          91 98.2  F (36.8  C) (Oral) 97% 39.4 kg/m2         Blood Pressure from Last 3 Encounters:   04/09/18 139/87   01/11/18 136/86   11/20/17 (!) 155/94    Weight from Last 3 Encounters:   04/09/18 263 lb (119.3 kg)   01/11/18 258 lb (117 kg)   11/20/17 265 lb (120.2 kg)              Today, you had the following     No orders found for display         Today's Medication Changes          These changes are accurate as of 4/9/18  5:14 PM.  If you have any questions, ask your nurse or doctor.               Stop taking these medicines if you haven't already. Please contact your care team if you have questions.     busPIRone 5 MG tablet   Commonly known as:  BUSPAR   Stopped by:  Prateek Ford MD           cephALEXin 500 MG capsule   Commonly known as:  KEFLEX   Stopped by:  Prateek Ford MD                Where to get your medicines      Some of these will need a paper prescription and others can be bought over the counter.  Ask your nurse if you have questions.     Bring a paper prescription for each of these medications     ALPRAZolam 0.25 MG tablet                Primary Care Provider Office Phone # Fax #    Prateek Ford [phone]       [address] [facility] [address]        Equal Access to Services     [facility] : Hadii barrett soliso Sokurt, waaxda luqadaha, qaybta kaalmada nickgunnar ramírezgertrude [facility] hayaan adeeg kharash la'aan . So [facility] [phone].    ATENCIÓN: Si habla español, tiene a mckeon disposición servicios gratuitos de asistencia lingüística. Llame al [phone].    We comply with applicable federal civil rights laws and [place] laws. We do not discriminate on the basis of race, color, national origin, age, disability, sex, sexual orientation, or gender identity.             Thank you!     Thank you for choosing [facility]  for your care. Our goal is always to provide you with excellent care. Hearing back from our patients is one way we can continue to improve our services. Please take a few minutes to complete the written survey that you may receive in the mail after your visit with us. Thank you!             Your Updated Medication List - Protect others around you: Learn how to safely use, store and throw away your medicines at www.disposemymeds.org.          This list is accurate as of 4/9/18  5:14 PM.  Always use your most recent med list.                   Brand Name Dispense Instructions for use Diagnosis    ALPRAZolam 0.25 MG tablet    XANAX    60 tablet    Take 1 tablet (0.25 mg) by mouth 3 times daily as needed for anxiety    SHIVANI (generalized anxiety disorder)       lisinopril 10 MG tablet    PRINIVIL/ZESTRIL    90 tablet    Take 1 tablet (10 mg) by mouth daily    Hypertension goal BP (blood pressure) < 140/90

## 2018-04-09 NOTE — NURSING NOTE
"Chief Complaint   Patient presents with     Anxiety     Follow up       Initial /87 (BP Location: Left arm, Patient Position: Sitting, Cuff Size: Adult Large)  Pulse 91  Temp 98.2  F (36.8  C) (Oral)  Wt 263 lb (119.3 kg)  SpO2 97%  BMI 39.4 kg/m2 Estimated body mass index is 39.4 kg/(m^2) as calculated from the following:    Height as of 1/11/18: 5' 8.5\" (1.74 m).    Weight as of this encounter: 263 lb (119.3 kg).  Medication Reconciliation: complete   Deborah Mosqueda MA      "

## 2018-04-10 ASSESSMENT — ANXIETY QUESTIONNAIRES: GAD7 TOTAL SCORE: 6

## 2018-04-10 ASSESSMENT — PATIENT HEALTH QUESTIONNAIRE - PHQ9: SUM OF ALL RESPONSES TO PHQ QUESTIONS 1-9: 1

## 2018-06-18 DIAGNOSIS — F41.1 GAD (GENERALIZED ANXIETY DISORDER): ICD-10-CM

## 2018-06-18 NOTE — TELEPHONE ENCOUNTER
Requested Prescriptions   Pending Prescriptions Disp Refills     ALPRAZolam (XANAX) 0.25 MG tablet 60 tablet 1     Sig: Take 1 tablet (0.25 mg) by mouth 3 times daily as needed for anxiety    There is no refill protocol information for this order        Last refill 4/9/18 # 60 with one refill  Last OV 4/9/18.    Routing refill request to provider for review/approval because:  Drug not on the [facility] refill protocol   Mikaela Sweeney RN [facility] Triage.

## 2018-06-18 NOTE — TELEPHONE ENCOUNTER
Reason for Call:  Medication or medication refill:    Do you use a [facility]?  Name of the pharmacy and phone number for the current request:  [facility]     Name of the medication requested: ALPRAZolam (XANAX) 0.25 MG tablet    Other request: Patient stated suppose to see pcp for refill but going out of town, wondering if pcp will refill until patient comes back. Please advise.     Can we leave a detailed message on this number? YES    Phone number patient can be reached at: Home number on file [phone] (home)    Best Time: Anytime    Call taken on 6/18/2018 at 1:31 PM by Addie Israel

## 2018-06-25 RX ORDER — ALPRAZOLAM 0.25 MG
0.25 TABLET ORAL 3 TIMES DAILY PRN
Qty: 60 TABLET | Refills: 0 | Status: SHIPPED | OUTPATIENT
Start: 2018-06-25 | End: 2018-08-10

## 2018-08-10 ENCOUNTER — OFFICE VISIT (OUTPATIENT)
Dept: FAMILY MEDICINE | Facility: CLINIC | Age: 28
End: 2018-08-10
Payer: COMMERCIAL

## 2018-08-10 VITALS
HEART RATE: 69 BPM | DIASTOLIC BLOOD PRESSURE: 87 MMHG | BODY MASS INDEX: 39.25 KG/M2 | WEIGHT: 262 LBS | SYSTOLIC BLOOD PRESSURE: 139 MMHG | TEMPERATURE: 98.1 F | OXYGEN SATURATION: 97 %

## 2018-08-10 DIAGNOSIS — F32.5 MAJOR DEPRESSION IN COMPLETE REMISSION (H): Primary | ICD-10-CM

## 2018-08-10 DIAGNOSIS — I10 HYPERTENSION GOAL BP (BLOOD PRESSURE) < 140/90: ICD-10-CM

## 2018-08-10 DIAGNOSIS — F41.1 GAD (GENERALIZED ANXIETY DISORDER): ICD-10-CM

## 2018-08-10 LAB
ANION GAP SERPL CALCULATED.3IONS-SCNC: 8 MMOL/L (ref 3–14)
BUN SERPL-MCNC: 11 MG/DL (ref 7–30)
CALCIUM SERPL-MCNC: 9.1 MG/DL (ref 8.5–10.1)
CHLORIDE SERPL-SCNC: 104 MMOL/L (ref 94–109)
CO2 SERPL-SCNC: 27 MMOL/L (ref 20–32)
CREAT SERPL-MCNC: 0.89 MG/DL (ref 0.66–1.25)
GFR SERPL CREATININE-BSD FRML MDRD: >90 ML/MIN/1.7M2
GLUCOSE SERPL-MCNC: 96 MG/DL (ref 70–99)
POTASSIUM SERPL-SCNC: 4.3 MMOL/L (ref 3.4–5.3)
SODIUM SERPL-SCNC: 139 MMOL/L (ref 133–144)

## 2018-08-10 PROCEDURE — 36415 COLL VENOUS BLD VENIPUNCTURE: CPT | Performed by: FAMILY MEDICINE

## 2018-08-10 PROCEDURE — 80048 BASIC METABOLIC PNL TOTAL CA: CPT | Performed by: FAMILY MEDICINE

## 2018-08-10 PROCEDURE — 99214 OFFICE O/P EST MOD 30 MIN: CPT | Performed by: FAMILY MEDICINE

## 2018-08-10 RX ORDER — ALPRAZOLAM 0.25 MG
0.25 TABLET ORAL 3 TIMES DAILY PRN
Qty: 60 TABLET | Refills: 0 | Status: SHIPPED | OUTPATIENT
Start: 2018-08-10 | End: 2018-09-10

## 2018-08-10 ASSESSMENT — ANXIETY QUESTIONNAIRES
7. FEELING AFRAID AS IF SOMETHING AWFUL MIGHT HAPPEN: NOT AT ALL
IF YOU CHECKED OFF ANY PROBLEMS ON THIS QUESTIONNAIRE, HOW DIFFICULT HAVE THESE PROBLEMS MADE IT FOR YOU TO DO YOUR WORK, TAKE CARE OF THINGS AT HOME, OR GET ALONG WITH OTHER PEOPLE: SOMEWHAT DIFFICULT
5. BEING SO RESTLESS THAT IT IS HARD TO SIT STILL: NOT AT ALL
GAD7 TOTAL SCORE: 3
2. NOT BEING ABLE TO STOP OR CONTROL WORRYING: NOT AT ALL
3. WORRYING TOO MUCH ABOUT DIFFERENT THINGS: NOT AT ALL
6. BECOMING EASILY ANNOYED OR IRRITABLE: SEVERAL DAYS
1. FEELING NERVOUS, ANXIOUS, OR ON EDGE: SEVERAL DAYS

## 2018-08-10 ASSESSMENT — PATIENT HEALTH QUESTIONNAIRE - PHQ9: 5. POOR APPETITE OR OVEREATING: SEVERAL DAYS

## 2018-08-10 NOTE — LETTER
My Depression Action Plan  Name: Tawanda Ramos   Date of Birth 1990  Date: 8/10/2018    My doctor: Prateek Ford   My clinic: [address] [id number] [phone]          GREEN    ZONE   Good Control    What it looks like:     Things are going generally well. You have normal up s and down s. You may even feel depressed from time to time, but bad moods usually last less than a day.   What you need to do:  1. Continue to care for yourself (see self care plan)  2. Check your depression survival kit and update it as needed  3. Follow your physician s recommendations including any medication.  4. Do not stop taking medication unless you consult with your physician first.           YELLOW         ZONE Getting Worse    What it looks like:     Depression is starting to interfere with your life.     It may be hard to get out of bed; you may be starting to isolate yourself from others.    Symptoms of depression are starting to last most all day and this has happened for several days.     You may have suicidal thoughts but they are not constant.   What you need to do:     1. Call your care team, your response to treatment will improve if you keep your care team informed of your progress. Yellow periods are signs an adjustment may need to be made.     2. Continue your self-care, even if you have to fake it!    3. Talk to someone in your support network    4. Open up your depression survival kit           RED    ZONE Medical Alert - Get Help    What it looks like:     Depression is seriously interfering with your life.     You may experience these or other symptoms: You can t get out of bed most days, can t work or engage in other necessary activities, you have trouble taking care of basic hygiene, or basic responsibilities, thoughts of suicide or death that will not go away, self-injurious behavior.     What you need to  do:  1. Call your care team and request a same-day appointment. If they are not available (weekends or after hours) call your local crisis line, emergency room or 911.            Depression Self Care Plan / Survival Kit    Self-Care for Depression  Here s the deal. Your body and mind are really not as separate as most people think.  What you do and think affects how you feel and how you feel influences what you do and think. This means if you do things that people who feel good do, it will help you feel better.  Sometimes this is all it takes.  There is also a place for medication and therapy depending on how severe your depression is, so be sure to consult with your medical provider and/ or Behavioral Health Consultant if your symptoms are worsening or not improving.     In order to better manage my stress, I will:    Exercise  Get some form of exercise, every day. This will help reduce pain and release endorphins, the  feel good  chemicals in your brain. This is almost as good as taking antidepressants!  This is not the same as joining a gym and then never going! (they count on that by the way ) It can be as simple as just going for a walk or doing some gardening, anything that will get you moving.      Hygiene   Maintain good hygiene (Get out of bed in the morning, Make your bed, Brush your teeth, Take a shower, and Get dressed like you were going to work, even if you are unemployed).  If your clothes don't fit try to get ones that do.    Diet  I will strive to eat foods that are good for me, drink plenty of water, and avoid excessive sugar, caffeine, alcohol, and other mood-altering substances.  Some foods that are helpful in depression are: complex carbohydrates, B vitamins, flaxseed, fish or fish oil, fresh fruits and vegetables.    Psychotherapy  I agree to participate in Individual Therapy (if recommended).    Medication  If prescribed medications, I agree to take them.  Missing doses can result in serious  side effects.  I understand that drinking alcohol, or other illicit drug use, may cause potential side effects.  I will not stop my medication abruptly without first discussing it with my provider.    Staying Connected With Others  I will stay in touch with my friends, family members, and my primary care provider/team.    Use your imagination  Be creative.  We all have a creative side; it doesn t matter if it s oil painting, sand castles, or mud pies! This will also kick up the endorphins.    Witness Beauty  (AKA stop and smell the roses) Take a look outside, even in mid-winter. Notice colors, textures. Watch the squirrels and birds.     Service to others  Be of service to others.  There is always someone else in need.  By helping others we can  get out of ourselves  and remember the really important things.  This also provides opportunities for practicing all the other parts of the program.    Humor  Laugh and be silly!  Adjust your TV habits for less news and crime-drama and more comedy.    Control your stress  Try breathing deep, massage therapy, biofeedback, and meditation. Find time to relax each day.     My support system    Clinic Contact:  Phone number:    Contact 1:  Phone number:    Contact 2:  Phone number:    Taoism/:  Phone number:    Therapist:  Phone number:    Local crisis center:    Phone number:    Other community support:  Phone number:

## 2018-08-10 NOTE — MR AVS SNAPSHOT
After Visit Summary   8/10/2018    Tawanda Ramos    MRN: [id number]           Patient Information     Date Of Birth          1990        Visit Information        Provider Department      8/10/2018 1:40 PM Prateek Ford MD [facility]        Today's Diagnoses     Major depression in complete remission (H)    -  1    SHIVANI (generalized anxiety disorder)        Hypertension goal BP (blood pressure) < 140/90           Follow-ups after your visit        Who to contact     If you have questions or need follow up information about today's clinic visit or your schedule please contact [facility] directly at [phone].  Normal or non-critical lab and imaging results will be communicated to you by MyChart, letter or phone within 4 business days after the clinic has received the results. If you do not hear from us within 7 days, please contact the clinic through MyChart or phone. If you have a critical or abnormal lab result, we will notify you by phone as soon as possible.  Submit refill requests through [employer] or call your pharmacy and they will forward the refill request to us. Please allow 3 business days for your refill to be completed.          Additional Information About Your Visit        Care EveryWhere ID     This is your Care EveryWhere ID. This could be used by other organizations to access your [facility] medical records  [id number]        Your Vitals Were     Pulse Temperature Pulse Oximetry BMI (Body Mass Index)          69 98.1  F (36.7  C) (Oral) 97% 39.25 kg/m2         Blood Pressure from Last 3 Encounters:   08/10/18 139/87   04/09/18 139/87   01/11/18 136/86    Weight from Last 3 Encounters:   08/10/18 262 lb (118.8 kg)   04/09/18 263 lb (119.3 kg)   01/11/18 258 lb (117 kg)              We Performed the Following     Basic metabolic panel          Where to get your medicines      Some of these will need a paper prescription and  others can be bought over the counter.  Ask your nurse if you have questions.     Bring a paper prescription for each of these medications     ALPRAZolam 0.25 MG tablet          Primary Care Provider Office Phone # Fax #    Prateek Ford [phone] [address] [facility] [address]        Equal Access to Services     [facility] : Hadii aad ku hadasho Soomaali, waaxda luqadaha, qaybta kaalmada adeegyada, waxay idiin hayaan adeeg kharash laion . So [facility] [phone].    ATENCIÓN: Si habla español, tiene a mckeon disposición servicios gratuitos de asistencia lingüística. Llame al [phone].    We comply with applicable federal civil rights laws and [place] laws. We do not discriminate on the basis of race, color, national origin, age, disability, sex, sexual orientation, or gender identity.            Thank you!     Thank you for choosing [facility]  for your care. Our goal is always to provide you with excellent care. Hearing back from our patients is one way we can continue to improve our services. Please take a few minutes to complete the written survey that you may receive in the mail after your visit with us. Thank you!             Your Updated Medication List - Protect others around you: Learn how to safely use, store and throw away your medicines at www.disposemymeds.org.          This list is accurate as of 8/10/18  2:42 PM.  Always use your most recent med list.                   Brand Name Dispense Instructions for use Diagnosis    ALPRAZolam 0.25 MG tablet    XANAX    60 tablet    Take 1 tablet (0.25 mg) by mouth 3 times daily as needed for anxiety    SHIVANI (generalized anxiety disorder)       lisinopril 10 MG tablet    PRINIVIL/ZESTRIL    90 tablet    Take 1 tablet (10 mg) by mouth daily    Hypertension goal BP (blood pressure) < 140/90

## 2018-08-10 NOTE — LETTER
[facility]   [address] [place]  [address]  [phone]                                   August 13, 2018    Tawanda Ramos  [address] [facility] [address]        Dear Tawanda,    Your basic metabolic panel which includes electrolytes,kidney function is normal and  -Glucose (diabetic screening test) is within normal limits     Follow up in 1 year    Results for orders placed or performed in visit on 08/10/18   Basic metabolic panel   Result Value Ref Range    Sodium 139 133 - 144 mmol/L    Potassium 4.3 3.4 - 5.3 mmol/L    Chloride 104 94 - 109 mmol/L    Carbon Dioxide 27 20 - 32 mmol/L    Anion Gap 8 3 - 14 mmol/L    Glucose 96 70 - 99 mg/dL    Urea Nitrogen 11 7 - 30 mg/dL    Creatinine 0.89 0.66 - 1.25 mg/dL    GFR Estimate >90 >60 mL/min/1.7m2    GFR Estimate If Black >90 >60 mL/min/1.7m2    Calcium 9.1 8.5 - 10.1 mg/dL       If you have any questions please call the clinic at [phone]    Sincerely,    Prateek Ford MD  l

## 2018-08-10 NOTE — PROGRESS NOTES
SUBJECTIVE:   Tawanda Ramos is a 27 year old male who presents to clinic today for the following health issues:      Medication Followup of Alprazolam    Taking Medication as prescribed: yes    Side Effects:  None    Medication Helping Symptoms:  yes       Current Outpatient Prescriptions   Medication Sig Dispense Refill     ALPRAZolam (XANAX) 0.25 MG tablet Take 1 tablet (0.25 mg) by mouth 3 times daily as needed for anxiety 60 tablet 0     lisinopril (PRINIVIL/ZESTRIL) 10 MG tablet Take 1 tablet (10 mg) by mouth daily 90 tablet 3       60 tabs of xanax last about 6 weeks   On average 2 times a day   He is accomplishing more   Going back to school   No side effects from medication     The pills make him feel normal   He has trouble winding down   Sometimes when overwhelmed at work   He brings some to work and does not use them   Having them there help     No history of addiction   Occasionally smokes marijuana     Depression is doing well   He was taking his prozac   No side effects from stopping     Patient feels he longe has a problems     Ros: no chest pain, chest tightness   No sob   No leg edema   No abdominal pain     Denies fever or chills   Weight stable   No dry cough         O; /87 (BP Location: Right arm, Patient Position: Sitting, Cuff Size: Adult Large)  Pulse 69  Temp 98.1  F (36.7  C) (Oral)  Wt 262 lb (118.8 kg)  SpO2 97%  BMI 39.25 kg/m2       Wt Readings from Last 4 Encounters:   08/10/18 262 lb (118.8 kg)   04/09/18 263 lb (119.3 kg)   01/11/18 258 lb (117 kg)   11/20/17 265 lb (120.2 kg)     Well dressed  Wearing [employer] shirt     Groomed ok   Good eye contact   Seems calm   Mood is happy     Chest wall normal to inspection and palpation. Good excursion bilaterally. Lungs clear to auscultation. Good air movement bilaterally without rales, wheezes, or rhonchi.   Regular rate and  rhythm. S1 and S2 normal, no murmurs, clicks, gallops or rubs. No edema or JVD.    (F32.5)  Major depression in complete remission (H)  (primary encounter diagnosis)  Comment: no longer taking meds   Plan: monitor every 6 mos with a phq-2 which has been normal and continues to be normal today     (F41.1) SHIVANI (generalized anxiety disorder)  Comment: good control   Plan: ALPRAZolam (XANAX) 0.25 MG tablet        Ok for refills for 3-4 months   Patient should be checked at least every 4 months in  The office     (I10) Hypertension goal BP (blood pressure) < 140/90  Comment: good control   Plan: Basic metabolic panel        Recheck bmp yearly                 Left arm ha

## 2018-08-11 ASSESSMENT — ANXIETY QUESTIONNAIRES: GAD7 TOTAL SCORE: 3

## 2018-09-10 DIAGNOSIS — F41.1 GAD (GENERALIZED ANXIETY DISORDER): ICD-10-CM

## 2018-09-10 NOTE — TELEPHONE ENCOUNTER
Requested Prescriptions   Pending Prescriptions Disp Refills     ALPRAZolam (XANAX) 0.25 MG tablet 60 tablet 0     Sig: Take 1 tablet (0.25 mg) by mouth 3 times daily as needed for anxiety    There is no refill protocol information for this order         Last Written Prescription Date:  8-10-18  Last Fill Quantity: 60,   # refills: 0  Last Office Visit: 8-10-18  Future Office visit:       Routing refill request to provider for review/approval because:  Drug not on the [facility], [facility] or [facility] refill protocol or controlled substance

## 2018-09-11 RX ORDER — ALPRAZOLAM 0.25 MG
0.25 TABLET ORAL 3 TIMES DAILY PRN
Qty: 60 TABLET | Refills: 0 | Status: SHIPPED | OUTPATIENT
Start: 2018-09-11 | End: 2018-10-16

## 2018-09-11 NOTE — TELEPHONE ENCOUNTER
Routing refill request to provider for review/approval because:  Drug not on the [facility] refill protocol       Lala Nickerson RN  [facility]

## 2018-10-16 DIAGNOSIS — F41.1 GAD (GENERALIZED ANXIETY DISORDER): ICD-10-CM

## 2018-10-16 RX ORDER — ALPRAZOLAM 0.25 MG
0.25 TABLET ORAL 3 TIMES DAILY PRN
Qty: 60 TABLET | Refills: 0 | Status: SHIPPED | OUTPATIENT
Start: 2018-10-16 | End: 2018-11-16

## 2018-10-16 NOTE — TELEPHONE ENCOUNTER
Requested Prescriptions   Pending Prescriptions Disp Refills     ALPRAZolam (XANAX) 0.25 MG tablet 60 tablet 0     Sig: Take 1 tablet (0.25 mg) by mouth 3 times daily as needed for anxiety    There is no refill protocol information for this order         Last Written Prescription Date:  9/11/18  Last Fill Quantity: 60,   # refills: 0  Last Office Visit: 8/10/18  Future Office visit:       Routing refill request to provider for review/approval because:  Drug not on the [facility], [facility] or [facility] refill protocol or controlled substance

## 2018-10-22 ENCOUNTER — OFFICE VISIT (OUTPATIENT)
Dept: FAMILY MEDICINE | Facility: CLINIC | Age: 28
End: 2018-10-22
Payer: COMMERCIAL

## 2018-10-22 VITALS
DIASTOLIC BLOOD PRESSURE: 82 MMHG | TEMPERATURE: 98.9 F | BODY MASS INDEX: 39.55 KG/M2 | WEIGHT: 264 LBS | OXYGEN SATURATION: 99 % | HEART RATE: 82 BPM | SYSTOLIC BLOOD PRESSURE: 136 MMHG

## 2018-10-22 DIAGNOSIS — I10 HYPERTENSION GOAL BP (BLOOD PRESSURE) < 140/90: ICD-10-CM

## 2018-10-22 PROCEDURE — 99213 OFFICE O/P EST LOW 20 MIN: CPT | Performed by: FAMILY MEDICINE

## 2018-10-22 RX ORDER — LISINOPRIL 20 MG/1
20 TABLET ORAL DAILY
Qty: 90 TABLET | Refills: 1 | Status: SHIPPED | OUTPATIENT
Start: 2018-10-22 | End: 2019-05-30

## 2018-10-22 ASSESSMENT — ANXIETY QUESTIONNAIRES
1. FEELING NERVOUS, ANXIOUS, OR ON EDGE: SEVERAL DAYS
2. NOT BEING ABLE TO STOP OR CONTROL WORRYING: SEVERAL DAYS
GAD7 TOTAL SCORE: 4
7. FEELING AFRAID AS IF SOMETHING AWFUL MIGHT HAPPEN: NOT AT ALL
6. BECOMING EASILY ANNOYED OR IRRITABLE: NOT AT ALL
3. WORRYING TOO MUCH ABOUT DIFFERENT THINGS: SEVERAL DAYS
5. BEING SO RESTLESS THAT IT IS HARD TO SIT STILL: NOT AT ALL
IF YOU CHECKED OFF ANY PROBLEMS ON THIS QUESTIONNAIRE, HOW DIFFICULT HAVE THESE PROBLEMS MADE IT FOR YOU TO DO YOUR WORK, TAKE CARE OF THINGS AT HOME, OR GET ALONG WITH OTHER PEOPLE: SOMEWHAT DIFFICULT

## 2018-10-22 ASSESSMENT — PATIENT HEALTH QUESTIONNAIRE - PHQ9: 5. POOR APPETITE OR OVEREATING: SEVERAL DAYS

## 2018-10-22 NOTE — MR AVS SNAPSHOT
After Visit Summary   10/22/2018    Tawanda Ramos    MRN: [id number]           Patient Information     Date Of Birth          1990        Visit Information        Provider Department      10/22/2018 9:20 AM Prateek Ford MD [facility]        Today's Diagnoses     Hypertension goal BP (blood pressure) < 140/90          Care Instructions    You will get blood done for the next visit . Non fasting is ok           Follow-ups after your visit        Follow-up notes from your care team     Return in about 3 months (around 1/22/2019) for BP Recheck.      Who to contact     If you have questions or need follow up information about today's clinic visit or your schedule please contact [facility] directly at [phone].  Normal or non-critical lab and imaging results will be communicated to you by MyChart, letter or phone within 4 business days after the clinic has received the results. If you do not hear from us within 7 days, please contact the clinic through MyChart or phone. If you have a critical or abnormal lab result, we will notify you by phone as soon as possible.  Submit refill requests through [employer] or call your pharmacy and they will forward the refill request to us. Please allow 3 business days for your refill to be completed.          Additional Information About Your Visit        Care EveryWhere ID     This is your Care EveryWhere ID. This could be used by other organizations to access your [facility] medical records  [id number]        Your Vitals Were     Pulse Temperature Pulse Oximetry BMI (Body Mass Index)          82 98.9  F (37.2  C) (Oral) 99% 39.55 kg/m2         Blood Pressure from Last 3 Encounters:   10/22/18 136/82   08/10/18 139/87   04/09/18 139/87    Weight from Last 3 Encounters:   10/22/18 264 lb (119.7 kg)   08/10/18 262 lb (118.8 kg)   04/09/18 263 lb (119.3 kg)              Today, you had the following     No  orders found for display         Today's Medication Changes          These changes are accurate as of 10/22/18 10:08 AM.  If you have any questions, ask your nurse or doctor.               These medicines have changed or have updated prescriptions.        Dose/Directions    lisinopril 20 MG tablet   Commonly known as:  PRINIVIL/ZESTRIL   This may have changed:    - medication strength  - how much to take   Used for:  Hypertension goal BP (blood pressure) < 140/90   Changed by:  Prateek Ford MD        Dose:  20 mg   Take 1 tablet (20 mg) by mouth daily   Quantity:  90 tablet   Refills:  1            Where to get your medicines      These medications were sent to [facility] [place] - [place], [place] - [address]  [address], [facility] [address]     Phone:  [phone]     lisinopril 20 MG tablet                Primary Care Provider Office Phone # Fax #    Prateek Ford [phone]       [address] [facility] [address]        Equal Access to Services     [facility] Hospital[facility]ET[facility] : Hadii aad ku hadasho Soomaali, waaxda luqadaha, qaybta kaalmada adeegyada, waxay idiin haynolvian nayeli kharachanda dudley . So [facility] [phone].    ATENCIÓN: Si habla español, tiene a mckeon disposición servicios gratuitos de asistencia lingüística. Llame al [phone].    We comply with applicable federal civil rights laws and [place] laws. We do not discriminate on the basis of race, color, national origin, age, disability, sex, sexual orientation, or gender identity.            Thank you!     Thank you for choosing [facility]  for your care. Our goal is always to provide you with excellent care. Hearing back from our patients is one way we can continue to improve our services. Please take a few minutes to complete the written survey that you may receive in the mail after your visit with us. Thank you!             Your Updated Medication  List - Protect others around you: Learn how to safely use, store and throw away your medicines at www.disposemymeds.org.          This list is accurate as of 10/22/18 10:08 AM.  Always use your most recent med list.                   Brand Name Dispense Instructions for use Diagnosis    ALPRAZolam 0.25 MG tablet    XANAX    60 tablet    Take 1 tablet (0.25 mg) by mouth 3 times daily as needed for anxiety    SHIVANI (generalized anxiety disorder)       lisinopril 20 MG tablet    PRINIVIL/ZESTRIL    90 tablet    Take 1 tablet (20 mg) by mouth daily    Hypertension goal BP (blood pressure) < 140/90

## 2018-10-22 NOTE — LETTER
October 22, 2018      Tawanda Ramos  [address] [facility] [address]        To Whom It May Concern:    Tawanda Ramos was seen in our clinic. He may return to school without restrictions.      Sincerely,        Prateek Ford MD

## 2018-10-22 NOTE — PROGRESS NOTES
SUBJECTIVE:   Tawanda Ramos is a 27 year old male who presents to clinic today for the following health issues:      Blood pressure follow up - Has been high  Had high bp when he checked it at home       Patient took 2 pills today     He is increasing his exercise   He does not do weight lifting   He is walking   He does this daily     Ros: no chest pain, chest tightness   No sob   No leg edema   No abdominal pain     Denies fever or chills   Weight stable     No dry cough         Current Outpatient Prescriptions   Medication Sig Dispense Refill     ALPRAZolam (XANAX) 0.25 MG tablet Take 1 tablet (0.25 mg) by mouth 3 times daily as needed for anxiety 60 tablet 0     lisinopril (PRINIVIL/ZESTRIL) 10 MG tablet Take 1 tablet (10 mg) by mouth daily 90 tablet 3       O: BP (!) 146/92 (BP Location: Right arm, Patient Position: Sitting, Cuff Size: Adult Large)  Pulse 82  Temp 98.9  F (37.2  C) (Oral)  Wt 264 lb (119.7 kg)  SpO2 99%  BMI 39.55 kg/m2     Chest wall normal to inspection and palpation. Good excursion bilaterally. Lungs clear to auscultation. Good air movement bilaterally without rales, wheezes, or rhonchi.   Regular rate and  rhythm. S1 and S2 normal, no murmurs, clicks, gallops or rubs. No edema or JVD.        ICD-10-CM    1. Hypertension goal BP (blood pressure) < 140/90 I10 lisinopril (PRINIVIL/ZESTRIL) 20 MG tablet     Recheck in 3 months   Took an extra pill and this seemed to help     I have increased th dose of his lIsinopril to 20 mg per day

## 2018-10-23 ASSESSMENT — ANXIETY QUESTIONNAIRES: GAD7 TOTAL SCORE: 4

## 2018-10-23 ASSESSMENT — PATIENT HEALTH QUESTIONNAIRE - PHQ9: SUM OF ALL RESPONSES TO PHQ QUESTIONS 1-9: 0

## 2018-11-16 DIAGNOSIS — F41.1 GAD (GENERALIZED ANXIETY DISORDER): ICD-10-CM

## 2018-11-16 RX ORDER — ALPRAZOLAM 0.25 MG
0.25 TABLET ORAL 3 TIMES DAILY PRN
Qty: 60 TABLET | Refills: 0 | Status: SHIPPED | OUTPATIENT
Start: 2018-11-16 | End: 2018-12-18

## 2018-11-16 NOTE — TELEPHONE ENCOUNTER
Requested Prescriptions   Pending Prescriptions Disp Refills     ALPRAZolam (XANAX) 0.25 MG tablet 60 tablet 0     Sig: Take 1 tablet (0.25 mg) by mouth 3 times daily as needed for anxiety    There is no refill protocol information for this order         Last Written Prescription Date:  10/16/18  Last Fill Quantity: 60,   # refills: 0  Last Office Visit: 10/22/18  Future Office visit:       Routing refill request to provider for review/approval because:  Drug not on the [facility], P or [facility] refill protocol or controlled substance

## 2018-12-18 ENCOUNTER — OFFICE VISIT (OUTPATIENT)
Dept: FAMILY MEDICINE | Facility: CLINIC | Age: 28
End: 2018-12-18
Payer: COMMERCIAL

## 2018-12-18 VITALS
WEIGHT: 264 LBS | DIASTOLIC BLOOD PRESSURE: 84 MMHG | SYSTOLIC BLOOD PRESSURE: 133 MMHG | HEART RATE: 82 BPM | BODY MASS INDEX: 39.55 KG/M2 | OXYGEN SATURATION: 96 % | TEMPERATURE: 98.1 F

## 2018-12-18 DIAGNOSIS — I10 HYPERTENSION GOAL BP (BLOOD PRESSURE) < 140/90: Primary | ICD-10-CM

## 2018-12-18 DIAGNOSIS — Z23 NEED FOR PROPHYLACTIC VACCINATION AND INOCULATION AGAINST INFLUENZA: ICD-10-CM

## 2018-12-18 DIAGNOSIS — F41.1 GAD (GENERALIZED ANXIETY DISORDER): ICD-10-CM

## 2018-12-18 LAB
ALBUMIN SERPL-MCNC: 4.7 G/DL (ref 3.4–5)
ALP SERPL-CCNC: 54 U/L (ref 40–150)
ALT SERPL W P-5'-P-CCNC: 53 U/L (ref 0–70)
ANION GAP SERPL CALCULATED.3IONS-SCNC: 10 MMOL/L (ref 3–14)
AST SERPL W P-5'-P-CCNC: 25 U/L (ref 0–45)
BILIRUB SERPL-MCNC: 0.8 MG/DL (ref 0.2–1.3)
BUN SERPL-MCNC: 8 MG/DL (ref 7–30)
CALCIUM SERPL-MCNC: 8.8 MG/DL (ref 8.5–10.1)
CHLORIDE SERPL-SCNC: 101 MMOL/L (ref 94–109)
CO2 SERPL-SCNC: 25 MMOL/L (ref 20–32)
CREAT SERPL-MCNC: 0.83 MG/DL (ref 0.66–1.25)
GFR SERPL CREATININE-BSD FRML MDRD: >90 ML/MIN/1.7M2
GLUCOSE SERPL-MCNC: 93 MG/DL (ref 70–99)
POTASSIUM SERPL-SCNC: 4.1 MMOL/L (ref 3.4–5.3)
PROT SERPL-MCNC: 8.1 G/DL (ref 6.8–8.8)
SODIUM SERPL-SCNC: 136 MMOL/L (ref 133–144)

## 2018-12-18 PROCEDURE — 99213 OFFICE O/P EST LOW 20 MIN: CPT | Mod: 25 | Performed by: FAMILY MEDICINE

## 2018-12-18 PROCEDURE — 80053 COMPREHEN METABOLIC PANEL: CPT | Performed by: FAMILY MEDICINE

## 2018-12-18 PROCEDURE — 36415 COLL VENOUS BLD VENIPUNCTURE: CPT | Performed by: FAMILY MEDICINE

## 2018-12-18 PROCEDURE — 90471 IMMUNIZATION ADMIN: CPT | Performed by: FAMILY MEDICINE

## 2018-12-18 PROCEDURE — 90686 IIV4 VACC NO PRSV 0.5 ML IM: CPT | Performed by: FAMILY MEDICINE

## 2018-12-18 RX ORDER — ALPRAZOLAM 0.25 MG
0.25 TABLET ORAL 3 TIMES DAILY PRN
Qty: 60 TABLET | Refills: 0 | Status: SHIPPED | OUTPATIENT
Start: 2018-12-18 | End: 2019-01-28

## 2018-12-18 ASSESSMENT — ANXIETY QUESTIONNAIRES
7. FEELING AFRAID AS IF SOMETHING AWFUL MIGHT HAPPEN: NOT AT ALL
3. WORRYING TOO MUCH ABOUT DIFFERENT THINGS: NOT AT ALL
GAD7 TOTAL SCORE: 2
5. BEING SO RESTLESS THAT IT IS HARD TO SIT STILL: NOT AT ALL
6. BECOMING EASILY ANNOYED OR IRRITABLE: NOT AT ALL
IF YOU CHECKED OFF ANY PROBLEMS ON THIS QUESTIONNAIRE, HOW DIFFICULT HAVE THESE PROBLEMS MADE IT FOR YOU TO DO YOUR WORK, TAKE CARE OF THINGS AT HOME, OR GET ALONG WITH OTHER PEOPLE: NOT DIFFICULT AT ALL
1. FEELING NERVOUS, ANXIOUS, OR ON EDGE: SEVERAL DAYS
2. NOT BEING ABLE TO STOP OR CONTROL WORRYING: NOT AT ALL

## 2018-12-18 ASSESSMENT — PATIENT HEALTH QUESTIONNAIRE - PHQ9: 5. POOR APPETITE OR OVEREATING: SEVERAL DAYS

## 2018-12-18 NOTE — PROGRESS NOTES
SUBJECTIVE:   Tawanda Ramos is a 28 year old male who presents to clinic today for the following health issues:      Lab draw    Medication Followup of Alprazolam    Taking Medication as prescribed: yes    Side Effects:  None    Medication Helping Symptoms:  yes       It is working well   He is using 3-4 times a week   It lasts about 5 weeks for the amount he is given     No side effects   One tab is usually perfect     Patient has 2 at night on occasion     Ros: no chest pain, chest tightness   No sob   No leg edema   No abdominal pain     Denies fever or chills   Weight stable     No cough     Seems good     Shivani-7 is 2     O: /84 (BP Location: Right arm, Patient Position: Sitting, Cuff Size: Adult Large)   Pulse 82   Temp 98.1  F (36.7  C) (Oral)   Wt 119.7 kg (264 lb)   SpO2 96%   BMI 39.55 kg/m        Chest wall normal to inspection and palpation. Good excursion bilaterally. Lungs clear to auscultation. Good air movement bilaterally without rales, wheezes, or rhonchi.   Regular rate and  rhythm. S1 and S2 normal, no murmurs, clicks, gallops or rubs. No edema or JVD.      ICD-10-CM    1. Hypertension goal BP (blood pressure) < 140/90 I10 Comprehensive metabolic panel   2. SHIVANI (generalized anxiety disorder) F41.1 ALPRAZolam (XANAX) 0.25 MG tablet     Recheck in 6 months             Reviewed and updated as needed this visit by clinical staff       Reviewed and updated as needed this visit by Provider

## 2018-12-18 NOTE — NURSING NOTE
Prescription of Alprazolam was given to patient at office visit  Date:  12/18/18  Signature:  Deborah Mosqueda MA

## 2018-12-18 NOTE — PROGRESS NOTES
Injectable Influenza Immunization Documentation    1.  Is the person to be vaccinated sick today?   No    2. Does the person to be vaccinated have an allergy to a component   of the vaccine?   No  Egg Allergy Algorithm Link    3. Has the person to be vaccinated ever had a serious reaction   to influenza vaccine in the past?   No    4. Has the person to be vaccinated ever had Guillain-Barré syndrome?   No    Due to injection administration, patient instructed to remain in clinic for 15 minutes  afterwards, and to report any adverse reaction to me immediately.    Vaccine information supplied.     Prior to injection, verified patient identity using patient's name and date of birth.  Due to injection administration, patient instructed to remain in clinic for 15 minutes  afterwards, and to report any adverse reaction to me immediately.    Flu Shot    Drug Amount Wasted:  None.  Vial/Syringe: Syringe     Form completed by mxHero MA

## 2018-12-19 ASSESSMENT — ANXIETY QUESTIONNAIRES: GAD7 TOTAL SCORE: 2

## 2018-12-21 NOTE — RESULT ENCOUNTER NOTE
Your basic metabolic panel which includes electrolytes,kidney function is normal and  -Glucose (diabetic screening test) is within normal limits    Follow up in 6 months

## 2019-01-28 DIAGNOSIS — F41.1 GAD (GENERALIZED ANXIETY DISORDER): ICD-10-CM

## 2019-01-28 RX ORDER — ALPRAZOLAM 0.25 MG
0.25 TABLET ORAL 3 TIMES DAILY PRN
Qty: 60 TABLET | Refills: 0 | Status: SHIPPED | OUTPATIENT
Start: 2019-01-28 | End: 2019-02-26

## 2019-01-28 NOTE — TELEPHONE ENCOUNTER
Requested Prescriptions   Pending Prescriptions Disp Refills     ALPRAZolam (XANAX) 0.25 MG tablet 60 tablet 0     Sig: Take 1 tablet (0.25 mg) by mouth 3 times daily as needed for anxiety    There is no refill protocol information for this order         Last Written Prescription Date:  12-18-*18  Last Fill Quantity: 60,   # refills: 0  Last Office Visit: 12-18-18  Future Office visit:       Routing refill request to provider for review/approval because:  Drug not on the [facility], P or [facility] refill protocol or controlled substance

## 2019-02-26 DIAGNOSIS — F41.1 GAD (GENERALIZED ANXIETY DISORDER): ICD-10-CM

## 2019-02-26 RX ORDER — ALPRAZOLAM 0.25 MG
0.25 TABLET ORAL 3 TIMES DAILY PRN
Qty: 60 TABLET | Refills: 0 | Status: SHIPPED | OUTPATIENT
Start: 2019-02-26 | End: 2019-04-04

## 2019-02-26 NOTE — TELEPHONE ENCOUNTER
Requested Prescriptions   Pending Prescriptions Disp Refills     ALPRAZolam (XANAX) 0.25 MG tablet 60 tablet 0     Sig: Take 1 tablet (0.25 mg) by mouth 3 times daily as needed for anxiety    There is no refill protocol information for this order         Last Written Prescription Date:  1-28-19  Last Fill Quantity: 60,   # refills: 0  Last Office Visit: 12-18-18  Future Office visit:       Routing refill request to provider for review/approval because:  Drug not on the [facility], P or [facility] refill protocol or controlled substance

## 2019-04-03 DIAGNOSIS — F41.1 GAD (GENERALIZED ANXIETY DISORDER): ICD-10-CM

## 2019-04-03 NOTE — TELEPHONE ENCOUNTER
Requested Prescriptions   Pending Prescriptions Disp Refills     ALPRAZolam (XANAX) 0.25 MG tablet 60 tablet 0     Sig: Take 1 tablet (0.25 mg) by mouth 3 times daily as needed for anxiety    There is no refill protocol information for this order         Last Written Prescription Date:  2/26/19  Last Fill Quantity: 60,   # refills: 0  Last Office Visit: 12/18/18  Future Office visit:       Routing refill request to provider for review/approval because:  Drug not on the [facility], P or [facility] refill protocol or controlled substance

## 2019-04-04 RX ORDER — ALPRAZOLAM 0.25 MG
0.25 TABLET ORAL 3 TIMES DAILY PRN
Qty: 60 TABLET | Refills: 0 | Status: SHIPPED | OUTPATIENT
Start: 2019-04-04 | End: 2019-05-03

## 2019-04-05 NOTE — TELEPHONE ENCOUNTER
Prescription for Xanax was brought  To [facility].  Rashmi [facility]  Team 3 Coordinator

## 2019-05-03 DIAGNOSIS — F41.1 GAD (GENERALIZED ANXIETY DISORDER): ICD-10-CM

## 2019-05-03 RX ORDER — ALPRAZOLAM 0.25 MG
0.25 TABLET ORAL 3 TIMES DAILY PRN
Qty: 60 TABLET | Refills: 0 | Status: SHIPPED | OUTPATIENT
Start: 2019-05-03 | End: 2019-06-13

## 2019-05-03 NOTE — TELEPHONE ENCOUNTER
Requested Prescriptions   Pending Prescriptions Disp Refills     ALPRAZolam (XANAX) 0.25 MG tablet 60 tablet 0     Sig: Take 1 tablet (0.25 mg) by mouth 3 times daily as needed for anxiety       There is no refill protocol information for this order        Last refill 4/4/19 # 60  Last OV 12/18/19    Mikaela Sweeney RN [facility] Triage.

## 2019-06-13 ENCOUNTER — OFFICE VISIT (OUTPATIENT)
Dept: FAMILY MEDICINE | Facility: CLINIC | Age: 29
End: 2019-06-13
Payer: COMMERCIAL

## 2019-06-13 VITALS
HEIGHT: 68 IN | HEART RATE: 68 BPM | TEMPERATURE: 98 F | SYSTOLIC BLOOD PRESSURE: 144 MMHG | OXYGEN SATURATION: 98 % | WEIGHT: 260 LBS | BODY MASS INDEX: 39.4 KG/M2 | DIASTOLIC BLOOD PRESSURE: 87 MMHG

## 2019-06-13 DIAGNOSIS — F41.1 GAD (GENERALIZED ANXIETY DISORDER): ICD-10-CM

## 2019-06-13 DIAGNOSIS — I10 HYPERTENSION GOAL BP (BLOOD PRESSURE) < 140/90: ICD-10-CM

## 2019-06-13 DIAGNOSIS — E66.01 MORBID OBESITY (H): ICD-10-CM

## 2019-06-13 PROCEDURE — 99213 OFFICE O/P EST LOW 20 MIN: CPT | Performed by: FAMILY MEDICINE

## 2019-06-13 RX ORDER — LISINOPRIL 20 MG/1
20 TABLET ORAL DAILY
Qty: 90 TABLET | Refills: 3 | Status: SHIPPED | OUTPATIENT
Start: 2019-06-13 | End: 2020-03-10

## 2019-06-13 RX ORDER — ALPRAZOLAM 0.25 MG
0.25 TABLET ORAL 3 TIMES DAILY PRN
Qty: 60 TABLET | Refills: 3 | Status: SHIPPED | OUTPATIENT
Start: 2019-06-13 | End: 2019-10-24

## 2019-06-13 ASSESSMENT — MIFFLIN-ST. JEOR: SCORE: 2118.85

## 2019-06-13 ASSESSMENT — ANXIETY QUESTIONNAIRES
GAD7 TOTAL SCORE: 3
7. FEELING AFRAID AS IF SOMETHING AWFUL MIGHT HAPPEN: NOT AT ALL
IF YOU CHECKED OFF ANY PROBLEMS ON THIS QUESTIONNAIRE, HOW DIFFICULT HAVE THESE PROBLEMS MADE IT FOR YOU TO DO YOUR WORK, TAKE CARE OF THINGS AT HOME, OR GET ALONG WITH OTHER PEOPLE: SOMEWHAT DIFFICULT
1. FEELING NERVOUS, ANXIOUS, OR ON EDGE: SEVERAL DAYS
5. BEING SO RESTLESS THAT IT IS HARD TO SIT STILL: NOT AT ALL
6. BECOMING EASILY ANNOYED OR IRRITABLE: SEVERAL DAYS
3. WORRYING TOO MUCH ABOUT DIFFERENT THINGS: SEVERAL DAYS
2. NOT BEING ABLE TO STOP OR CONTROL WORRYING: NOT AT ALL

## 2019-06-13 ASSESSMENT — PATIENT HEALTH QUESTIONNAIRE - PHQ9
SUM OF ALL RESPONSES TO PHQ QUESTIONS 1-9: 2
5. POOR APPETITE OR OVEREATING: NOT AT ALL

## 2019-06-13 NOTE — PROGRESS NOTES
"Subjective     Tawanda Ramos is a 28 year old male who presents to clinic today for the following health issues:    HPI     Ran out of meds   He called in a refill   He ran out of xanax     Using xanax 3-4 times a week   He can sometimes use it every work day         Anxiety and Blood pressure follow up  O: /87 (BP Location: Left arm, Patient Position: Sitting, Cuff Size: Adult Large)   Pulse 68   Temp 98  F (36.7  C) (Oral)   Ht 1.727 m (5' 8\")   Wt 117.9 kg (260 lb)   SpO2 98%   BMI 39.53 kg/m          BP Readings from Last 6 Encounters:   06/13/19 144/87   12/18/18 133/84   10/22/18 136/82   08/10/18 139/87   04/09/18 139/87   01/11/18 136/86       Objective    /87 (BP Location: Left arm, Patient Position: Sitting, Cuff Size: Adult Large)   Pulse 68   Temp 98  F (36.7  C) (Oral)   Ht 1.727 m (5' 8\")   Wt 117.9 kg (260 lb)   SpO2 98%   BMI 39.53 kg/m    There is no height or weight on file to calculate BMI.  Physical Exam     She appears comfortable.  He is well-dressed  He is well-groomed  He is interactive  His speech is spontaneous  He is oriented  No evidence of hallucinations    His SHIVANI-7 and PHQ-9 scores are excellent today.      ICD-10-CM    1. SHIVANI (generalized anxiety disorder) F41.1 ALPRAZolam (XANAX) 0.25 MG tablet   2. Hypertension goal BP (blood pressure) < 140/90 I10 lisinopril (PRINIVIL/ZESTRIL) 20 MG tablet   3. Morbid obesity (H) E66.01      Patient is not overusing his Xanax.  He is is that perhaps once a day and only uses it when he goes to work.  More often he uses that 2 or 3 times a week.  60 tablets tends to last about 6 weeks          "

## 2019-06-13 NOTE — NURSING NOTE
Prescription of Xanax was given to patient at office visit  Date:  6/13/19  Signature:  Deborah Mosqueda MA

## 2019-06-14 ASSESSMENT — ANXIETY QUESTIONNAIRES: GAD7 TOTAL SCORE: 3

## 2019-07-11 ENCOUNTER — OFFICE VISIT (OUTPATIENT)
Dept: FAMILY MEDICINE | Facility: CLINIC | Age: 29
End: 2019-07-11
Payer: COMMERCIAL

## 2019-07-11 VITALS
OXYGEN SATURATION: 97 % | DIASTOLIC BLOOD PRESSURE: 84 MMHG | SYSTOLIC BLOOD PRESSURE: 126 MMHG | BODY MASS INDEX: 38.77 KG/M2 | HEART RATE: 74 BPM | TEMPERATURE: 99.1 F | WEIGHT: 255 LBS

## 2019-07-11 DIAGNOSIS — M54.6 ACUTE BILATERAL THORACIC BACK PAIN: Primary | ICD-10-CM

## 2019-07-11 LAB
ALBUMIN UR-MCNC: NEGATIVE MG/DL
APPEARANCE UR: CLEAR
BILIRUB UR QL STRIP: NEGATIVE
COLOR UR AUTO: YELLOW
GLUCOSE UR STRIP-MCNC: NEGATIVE MG/DL
HGB UR QL STRIP: NEGATIVE
KETONES UR STRIP-MCNC: NEGATIVE MG/DL
LEUKOCYTE ESTERASE UR QL STRIP: NEGATIVE
NITRATE UR QL: NEGATIVE
PH UR STRIP: 6 PH (ref 5–7)
SOURCE: NORMAL
SP GR UR STRIP: 1.01 (ref 1–1.03)
UROBILINOGEN UR STRIP-ACNC: 0.2 EU/DL (ref 0.2–1)

## 2019-07-11 PROCEDURE — 81003 URINALYSIS AUTO W/O SCOPE: CPT | Performed by: FAMILY MEDICINE

## 2019-07-11 PROCEDURE — 99213 OFFICE O/P EST LOW 20 MIN: CPT | Performed by: FAMILY MEDICINE

## 2019-07-11 RX ORDER — CYCLOBENZAPRINE HCL 5 MG
5 TABLET ORAL 3 TIMES DAILY PRN
Qty: 14 TABLET | Refills: 0 | Status: SHIPPED | OUTPATIENT
Start: 2019-07-11 | End: 2019-10-24

## 2019-07-11 RX ORDER — DICLOFENAC SODIUM 75 MG/1
75 TABLET, DELAYED RELEASE ORAL 2 TIMES DAILY
Qty: 28 TABLET | Refills: 0 | Status: SHIPPED | OUTPATIENT
Start: 2019-07-11 | End: 2019-10-24

## 2019-07-11 NOTE — PROGRESS NOTES
Subjective     Tawanda Ramos is a 28 year old male who presents to clinic today for the following health issues:    Constant pain and then gets sharp pain infrequently     HPI   Genitourinary - Male  Onset: x 2 weeks    Description:   Dysuria (painful urination): no   Hematuria (blood in urine): no   Frequency: no   Are you urinating at night : no   Hesitancy (delay in urine): no   Retention (unable to empty): no   Decrease in urinary flow: no   Incontinence: no     Progression of Symptoms:  Same to worsening    Accompanying Signs & Symptoms:  Fever: no   Back/Flank Ache: YES- Bilat  Urethral discharge: no   Testicle lumps/masses/pain: no   Nausea and/or vomiting: no   Abdominal pain: no     History:   History of frequent UTI's: no   History of kidney stones: no   History of hernias: no   Personal or Family history of Prostate problems: no  Sexually active: no     Alleviating factors:  Tylenol and Ibu helps    O: /84   Pulse 74   Temp 99.1  F (37.3  C) (Pulmonary Artery)   Wt 115.7 kg (255 lb)   SpO2 97%   BMI 38.77 kg/m      Patient has pain that is aggravated by twisting upper body   No pain with bending forward   No redness and no rash   No specific location for pain   Generally in lower ribs     No cva tenderness, seems higher than that     No pain over thoracic vertebrae     Results for orders placed or performed in visit on 07/11/19   UA reflex to Microscopic and Culture   Result Value Ref Range    Color Urine Yellow     Appearance Urine Clear     Glucose Urine Negative NEG^Negative mg/dL    Bilirubin Urine Negative NEG^Negative    Ketones Urine Negative NEG^Negative mg/dL    Specific Gravity Urine 1.010 1.003 - 1.035    Blood Urine Negative NEG^Negative    pH Urine 6.0 5.0 - 7.0 pH    Protein Albumin Urine Negative NEG^Negative mg/dL    Urobilinogen Urine 0.2 0.2 - 1.0 EU/dL    Nitrite Urine Negative NEG^Negative    Leukocyte Esterase Urine Negative NEG^Negative    Source Midstream Urine             ICD-10-CM    1. Acute bilateral thoracic back pain M54.6 UA reflex to Microscopic and Culture     This pain appears to be muscular   Patient recently got a new bed and may be related to this

## 2019-09-24 ENCOUNTER — OFFICE VISIT (OUTPATIENT)
Dept: URGENT CARE | Facility: URGENT CARE | Age: 29
End: 2019-09-24
Payer: COMMERCIAL

## 2019-09-24 VITALS
HEART RATE: 76 BPM | OXYGEN SATURATION: 99 % | SYSTOLIC BLOOD PRESSURE: 148 MMHG | TEMPERATURE: 100.3 F | DIASTOLIC BLOOD PRESSURE: 85 MMHG | WEIGHT: 255 LBS | BODY MASS INDEX: 38.77 KG/M2
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
## 2019-09-24 DIAGNOSIS — T07.XXXA ABRASIONS OF MULTIPLE SITES: Primary | ICD-10-CM

## 2019-09-24 DIAGNOSIS — T14.8XXA PUNCTURE WOUND: ICD-10-CM

## 2019-09-24 PROCEDURE — 99213 OFFICE O/P EST LOW 20 MIN: CPT | Performed by: FAMILY MEDICINE

## 2019-09-24 RX ORDER — MUPIROCIN 20 MG/G
OINTMENT TOPICAL 3 TIMES DAILY
Qty: 22 G | Refills: 0 | Status: SHIPPED | OUTPATIENT
Start: 2019-09-24 | End: 2019-10-24

## 2019-09-25 NOTE — PROGRESS NOTES
Subjective     Tawanda Ramos is a 28 year old male who presents to clinic today for the following health issues:    His cats attacked him.  He was in the middle of the cats trying to call more than the other one then attacked him he has scratches and lacerations on his arms more on the right.      HPI         Patient Active Problem List   Diagnosis     CARDIOVASCULAR SCREENING; LDL GOAL LESS THAN 160     Major depression in complete remission (H)     Hypertension goal BP (blood pressure) < 140/90     Fracture of metacarpal of right hand, closed     Obesity (BMI 35.0-39.9) with comorbidity (H)     No past surgical history on file.    Social History     Tobacco Use     Smoking status: Never Smoker     Smokeless tobacco: Never Used   Substance Use Topics     Alcohol use: Yes     Comment: social     Family History   Problem Relation Age of Onset     Asthma Mother      Hypertension Father      Cerebrovascular Disease Maternal Grandmother      Arthritis Maternal Grandmother      Prostate Cancer Maternal Grandfather      Arthritis Maternal Grandfather      Cancer Maternal Grandfather      Diabetes Paternal Grandmother      Cerebrovascular Disease Paternal Grandfather      Cancer Paternal Grandfather      Hypertension Brother            Reviewed and updated as needed this visit by Provider         Review of Systems   ROS COMP: Constitutional, HEENT, cardiovascular, pulmonary, GI, , musculoskeletal, neuro, skin, endocrine and psych systems are negative, except as otherwise noted.      Objective    BP (!) 148/85   Pulse 76   Temp 100.3  F (37.9  C) (Oral)   Wt 115.7 kg (255 lb)   SpO2 99%   BMI 38.77 kg/m    Body mass index is 38.77 kg/m .  Physical Exam   GENERAL: alert and mild distress  HENT: ear canals and TM's normal, nose and mouth without ulcers or lesions  NECK: no adenopathy, no asymmetry, masses, or scars and thyroid normal to palpation  RESP: lungs clear to auscultation - no rales, rhonchi or  "wheezes  CV: regular rate and rhythm, normal S1 S2, no S3 or S4, no murmur, click or rub, no peripheral edema and peripheral pulses strong  ABDOMEN: soft, nontender, no hepatosplenomegaly, no masses and bowel sounds normal  MS: no gross musculoskeletal defects noted, no edema  SKIN: Crisscross lacerations of his rt forearm.  Right   forearm there is puncture wounds    Diagnostic Test Results:  Labs reviewed in Epic  none        Assessment & Plan       ICD-10-CM    1. Cat bite, initial encounter W55.01XA amoxicillin-clavulanate (AUGMENTIN) 875-125 MG tablet     mupirocin (BACTROBAN) 2 % external ointment      2.  Superficial lacerations     BMI:   Estimated body mass index is 38.77 kg/m  as calculated from the following:    Height as of 6/13/19: 1.727 m (5' 8\").    Weight as of this encounter: 115.7 kg (255 lb).         He has various scratches superficial abrasions and puncture wounds.    He needs antibiotics and we will prescribe Augmentin, he will cover the wounds on especially on his right arm with a dressing after applying Bactroban    He will return to clinic within the next 24 to 48 hours if there is any evidence  Jimy Inman MD  [facility]    "

## 2019-10-24 ENCOUNTER — OFFICE VISIT (OUTPATIENT)
Dept: FAMILY MEDICINE | Facility: CLINIC | Age: 29
End: 2019-10-24
Payer: COMMERCIAL

## 2019-10-24 VITALS
SYSTOLIC BLOOD PRESSURE: 145 MMHG | WEIGHT: 259.4 LBS | DIASTOLIC BLOOD PRESSURE: 98 MMHG | HEART RATE: 74 BPM | TEMPERATURE: 98.2 F | BODY MASS INDEX: 39.44 KG/M2

## 2019-10-24 DIAGNOSIS — F32.5 MAJOR DEPRESSION IN COMPLETE REMISSION (H): Primary | ICD-10-CM

## 2019-10-24 DIAGNOSIS — F41.1 GAD (GENERALIZED ANXIETY DISORDER): ICD-10-CM

## 2019-10-24 PROCEDURE — 99214 OFFICE O/P EST MOD 30 MIN: CPT | Performed by: FAMILY MEDICINE

## 2019-10-24 RX ORDER — ALPRAZOLAM 0.25 MG
0.25 TABLET ORAL 3 TIMES DAILY PRN
Qty: 60 TABLET | Refills: 3 | Status: SHIPPED | OUTPATIENT
Start: 2019-10-24 | End: 2020-03-10

## 2019-10-24 ASSESSMENT — ANXIETY QUESTIONNAIRES
2. NOT BEING ABLE TO STOP OR CONTROL WORRYING: NOT AT ALL
3. WORRYING TOO MUCH ABOUT DIFFERENT THINGS: SEVERAL DAYS
GAD7 TOTAL SCORE: 3
5. BEING SO RESTLESS THAT IT IS HARD TO SIT STILL: NOT AT ALL
7. FEELING AFRAID AS IF SOMETHING AWFUL MIGHT HAPPEN: NOT AT ALL
6. BECOMING EASILY ANNOYED OR IRRITABLE: NOT AT ALL
IF YOU CHECKED OFF ANY PROBLEMS ON THIS QUESTIONNAIRE, HOW DIFFICULT HAVE THESE PROBLEMS MADE IT FOR YOU TO DO YOUR WORK, TAKE CARE OF THINGS AT HOME, OR GET ALONG WITH OTHER PEOPLE: SOMEWHAT DIFFICULT
1. FEELING NERVOUS, ANXIOUS, OR ON EDGE: SEVERAL DAYS

## 2019-10-24 ASSESSMENT — PATIENT HEALTH QUESTIONNAIRE - PHQ9
5. POOR APPETITE OR OVEREATING: SEVERAL DAYS
SUM OF ALL RESPONSES TO PHQ QUESTIONS 1-9: 1

## 2019-10-24 NOTE — PROGRESS NOTES
Subjective     Tawanda Ramos is a 28 year old male who presents to clinic today for the following health issues:    HPI     Anxiety Follow up              Reviewed and updated as needed this visit by Provider       Still having anxiety   He feels the xanax works the best   The prozac has helped the depression   He uses the xanax 3-4 days of the week and he may take them 2-3 times per day   They make him feel normal when he gets wound up           Objective    BP (!) 145/98 (BP Location: Left arm, Patient Position: Sitting, Cuff Size: Adult Large)   Pulse 74   Temp 98.2  F (36.8  C) (Oral)   Wt 117.7 kg (259 lb 6.4 oz)   BMI 39.44 kg/m    Body mass index is 39.44 kg/m .  Physical Exam     BP Readings from Last 6 Encounters:   10/24/19 (!) 145/98   09/24/19 (!) 148/85   07/11/19 126/84   06/13/19 144/87   12/18/18 133/84   10/22/18 136/82     Wt Readings from Last 4 Encounters:   10/24/19 117.7 kg (259 lb 6.4 oz)   09/24/19 115.7 kg (255 lb)   07/11/19 115.7 kg (255 lb)   06/13/19 117.9 kg (260 lb)         ICD-10-CM    1. Major depression in complete remission (H) F32.5    2. SHIVANI (generalized anxiety disorder) F41.1 MENTAL HEALTH REFERRAL  - Adult; Psychiatry and Medication Management; Psychiatry; [facility]: [facility] [phone].  Medication management & future refills will be returned to [facility] PCP upon completion of evaluation; We rupa...     ALPRAZolam (XANAX) 0.25 MG tablet     Patient's depression is well controlled on the Prozac.  Patient seems to get aggravated as the day goes on and he winds up need to use 2-3 Xanax per day when he takes them he takes in 3 to 4 days a week.  He has tried BuSpar in the past and this is actually aggravated his anxiety.  We talked in the past about other antidepressant medications I have more of a sedative type effect however he found that the Prozac works best for his depression.  The combination of the Prozac with the Xanax periodically seems to be  working.    I had suggested to him that he see a psychiatrist to go over his medications see if they think there is a better combination.  Otherwise he can do a collaborative treatment with his new physician and the psychiatrist.

## 2019-10-25 ASSESSMENT — ANXIETY QUESTIONNAIRES: GAD7 TOTAL SCORE: 3

## 2020-03-01 ENCOUNTER — HEALTH MAINTENANCE LETTER (OUTPATIENT)
Age: 30
End: 2020-03-01

## 2020-03-10 ENCOUNTER — OFFICE VISIT (OUTPATIENT)
Dept: FAMILY MEDICINE | Facility: CLINIC | Age: 30
End: 2020-03-10
Payer: COMMERCIAL

## 2020-03-10 VITALS
HEART RATE: 90 BPM | SYSTOLIC BLOOD PRESSURE: 136 MMHG | BODY MASS INDEX: 39.86 KG/M2 | TEMPERATURE: 99.4 F | WEIGHT: 263 LBS | DIASTOLIC BLOOD PRESSURE: 86 MMHG | HEIGHT: 68 IN | OXYGEN SATURATION: 98 %

## 2020-03-10 DIAGNOSIS — E66.01 MORBID OBESITY (H): ICD-10-CM

## 2020-03-10 DIAGNOSIS — F32.5 MAJOR DEPRESSION IN COMPLETE REMISSION (H): ICD-10-CM

## 2020-03-10 DIAGNOSIS — I10 HYPERTENSION GOAL BP (BLOOD PRESSURE) < 140/90: ICD-10-CM

## 2020-03-10 DIAGNOSIS — F41.1 GAD (GENERALIZED ANXIETY DISORDER): Primary | ICD-10-CM

## 2020-03-10 LAB
ANION GAP SERPL CALCULATED.3IONS-SCNC: 6 MMOL/L (ref 3–14)
BUN SERPL-MCNC: 10 MG/DL (ref 7–30)
CALCIUM SERPL-MCNC: 9.4 MG/DL (ref 8.5–10.1)
CHLORIDE SERPL-SCNC: 102 MMOL/L (ref 94–109)
CO2 SERPL-SCNC: 29 MMOL/L (ref 20–32)
CREAT SERPL-MCNC: 0.79 MG/DL (ref 0.66–1.25)
GFR SERPL CREATININE-BSD FRML MDRD: >90 ML/MIN/{1.73_M2}
GLUCOSE SERPL-MCNC: 123 MG/DL (ref 70–99)
POTASSIUM SERPL-SCNC: 4.4 MMOL/L (ref 3.4–5.3)
SODIUM SERPL-SCNC: 137 MMOL/L (ref 133–144)

## 2020-03-10 PROCEDURE — 99214 OFFICE O/P EST MOD 30 MIN: CPT | Performed by: FAMILY MEDICINE

## 2020-03-10 PROCEDURE — 36415 COLL VENOUS BLD VENIPUNCTURE: CPT | Performed by: FAMILY MEDICINE

## 2020-03-10 PROCEDURE — 80048 BASIC METABOLIC PNL TOTAL CA: CPT | Performed by: FAMILY MEDICINE

## 2020-03-10 RX ORDER — ALPRAZOLAM 0.25 MG
0.25 TABLET ORAL 3 TIMES DAILY PRN
Qty: 60 TABLET | Refills: 3 | Status: SHIPPED | OUTPATIENT
Start: 2020-03-10 | End: 2020-06-15

## 2020-03-10 RX ORDER — LISINOPRIL 20 MG/1
20 TABLET ORAL DAILY
Qty: 90 TABLET | Refills: 3 | Status: SHIPPED | OUTPATIENT
Start: 2020-03-10 | End: 2021-03-17

## 2020-03-10 SDOH — HEALTH STABILITY: MENTAL HEALTH: HOW OFTEN DO YOU HAVE A DRINK CONTAINING ALCOHOL?: 2-4 TIMES A MONTH

## 2020-03-10 SDOH — HEALTH STABILITY: MENTAL HEALTH: HOW MANY STANDARD DRINKS CONTAINING ALCOHOL DO YOU HAVE ON A TYPICAL DAY?: 1 OR 2

## 2020-03-10 ASSESSMENT — ANXIETY QUESTIONNAIRES
GAD7 TOTAL SCORE: 4
6. BECOMING EASILY ANNOYED OR IRRITABLE: NOT AT ALL
2. NOT BEING ABLE TO STOP OR CONTROL WORRYING: SEVERAL DAYS
IF YOU CHECKED OFF ANY PROBLEMS ON THIS QUESTIONNAIRE, HOW DIFFICULT HAVE THESE PROBLEMS MADE IT FOR YOU TO DO YOUR WORK, TAKE CARE OF THINGS AT HOME, OR GET ALONG WITH OTHER PEOPLE: SOMEWHAT DIFFICULT
5. BEING SO RESTLESS THAT IT IS HARD TO SIT STILL: NOT AT ALL
7. FEELING AFRAID AS IF SOMETHING AWFUL MIGHT HAPPEN: NOT AT ALL
1. FEELING NERVOUS, ANXIOUS, OR ON EDGE: SEVERAL DAYS
3. WORRYING TOO MUCH ABOUT DIFFERENT THINGS: SEVERAL DAYS

## 2020-03-10 ASSESSMENT — PATIENT HEALTH QUESTIONNAIRE - PHQ9: 5. POOR APPETITE OR OVEREATING: SEVERAL DAYS

## 2020-03-10 ASSESSMENT — PAIN SCALES - GENERAL: PAINLEVEL: NO PAIN (0)

## 2020-03-10 ASSESSMENT — MIFFLIN-ST. JEOR: SCORE: 2127.46

## 2020-03-10 NOTE — PROGRESS NOTES
Subjective     Tawanda Ramos is a 29 year old male who presents to clinic today for the following health issues:    HPI   Hypertension Follow-up      Do you check your blood pressure regularly outside of the clinic? No     Are you following a low salt diet? No    Are your blood pressures ever more than 140 on the top number (systolic) OR more   than 90 on the bottom number (diastolic), for example 140/90?     Anxiety Follow-Up    How are you doing with your anxiety since your last visit? No change    Are you having other symptoms that might be associated with anxiety? Yes:  worry    Have you had a significant life event? No     Are you feeling depressed? No    Do you have any concerns with your use of alcohol or other drugs? No    Social History     Tobacco Use     Smoking status: Never Smoker     Smokeless tobacco: Never Used   Substance Use Topics     Alcohol use: Yes     Frequency: 2-4 times a month     Drinks per session: 1 or 2     Comment: social     Drug use: Yes     Types: Marijuana     Comment: occasional     SHIVANI-7 SCORE 6/13/2019 10/24/2019 3/10/2020   Total Score - - -   Total Score 3 3 4     PHQ 10/22/2018 6/13/2019 10/24/2019   PHQ-9 Total Score 0 2 1   Q9: Thoughts of better off dead/self-harm past 2 weeks Not at all Not at all Not at all     SHIVANI-7  3/10/2020   1. Feeling nervous, anxious, or on edge 1   2. Not being able to stop or control worrying 1   3. Worrying too much about different things 1   4. Trouble relaxing 1   5. Being so restless that it is hard to sit still 0   6. Becoming easily annoyed or irritable 0   7. Feeling afraid, as if something awful might happen 0   SHIVANI-7 Total Score 4   If you checked any problems, how difficult have they made it for you to do your work, take care of things at home, or get along with other people? Somewhat difficult         How many servings of fruits and vegetables do you eat daily?  2-3    On average, how many sweetened beverages do you drink each day  (Examples: soda, juice, sweet tea, etc.  Do NOT count diet or artificially sweetened beverages)?   2    How many days per week do you exercise enough to make your heart beat faster? 3 or less    How many minutes a day do you exercise enough to make your heart beat faster? 30 - 60    How many days per week do you miss taking your medication? 0        Patient is here to establish care for his hypertension and anxiety.  Blood pressures typically run better outside the office and he tolerates the lisinopril without any complaints or symptoms or side effects.  He is due for some blood work today    He currently takes alprazolam 0.25 mg as needed for anxiety.  He may take anywhere from 2 to 3/day, 4 to 5 days/week.  He reports having been on fluoxetine and buspirone in the past which were ineffective or poorly tolerated due to side effects.  After trials of those medications he was switched over to clorazepate which worked but it lasted too long and made him too fatigued.  That is what led to the transition to alprazolam which she has been on now for a number of years.  He notes that his father and both brothers also take alprazolam for anxiety.  He has not seen psychiatry.  He has not done any urine drug screening.  He does not have a controlled substance agreement on file.  He does report significant improvement in his symptoms of anxiety when taking the alprazolam.  He tries to take days off.  It is helpful with both work and school per his report.    Patient Active Problem List   Diagnosis     CARDIOVASCULAR SCREENING; LDL GOAL LESS THAN 160     Major depression in complete remission (H)     Hypertension goal BP (blood pressure) < 140/90     Fracture of metacarpal of right hand, closed     Obesity (BMI 35.0-39.9) with comorbidity (H)     SHIVANI (generalized anxiety disorder)     Past Surgical History:   Procedure Laterality Date     DENTAL SURGERY      wisdom teeth       Social History     Tobacco Use     Smoking status:  "Never Smoker     Smokeless tobacco: Never Used   Substance Use Topics     Alcohol use: Yes     Frequency: 2-4 times a month     Drinks per session: 1 or 2     Comment: social     Family History   Problem Relation Age of Onset     Asthma Mother      Hypertension Father      Anxiety Disorder Father      Cerebrovascular Disease Maternal Grandmother      Arthritis Maternal Grandmother      Prostate Cancer Maternal Grandfather      Arthritis Maternal Grandfather      Cancer Maternal Grandfather      Diabetes Paternal Grandmother      Cerebrovascular Disease Paternal Grandfather      Cancer Paternal Grandfather      Hypertension Brother      Anxiety Disorder Brother      Anxiety Disorder Brother          Current Outpatient Medications   Medication Sig Dispense Refill     ALPRAZolam (XANAX) 0.25 MG tablet Take 1 tablet (0.25 mg) by mouth 3 times daily as needed for anxiety 60 tablet 3     lisinopril (ZESTRIL) 20 MG tablet Take 1 tablet (20 mg) by mouth daily 90 tablet 3     Allergies   Allergen Reactions     Codeine          Reviewed and updated as needed this visit by Provider  Tobacco  Allergies  Meds  Problems  Med Hx  Surg Hx  Fam Hx  Soc Hx          Review of Systems   ROS COMP: Constitutional, HEENT, cardiovascular, pulmonary, gi and gu systems are negative, except as otherwise noted.      Objective    /86 (BP Location: Left arm, Patient Position: Sitting, Cuff Size: Adult Large)   Pulse 90   Temp 99.4  F (37.4  C) (Oral)   Ht 1.727 m (5' 8\")   Wt 119.3 kg (263 lb)   SpO2 98%   BMI 39.99 kg/m    Body mass index is 39.99 kg/m .  Physical Exam   GENERAL: healthy, alert and no distress  EYES: Eyes grossly normal to inspection, PERRL and conjunctivae and sclerae normal  HENT: ear canals and TM's normal, nose and mouth without ulcers or lesions  NECK: no adenopathy, no asymmetry, masses, or scars and thyroid normal to palpation  RESP: lungs clear to auscultation - no rales, rhonchi or wheezes  CV: " "regular rate and rhythm, normal S1 S2, no S3 or S4, no murmur, click or rub, no peripheral edema and peripheral pulses strong  MS: no gross musculoskeletal defects noted, no edema  SKIN: no suspicious lesions or rashes  NEURO: Normal strength and tone, mentation intact and speech normal  PSYCH: mentation appears normal, affect normal/bright    Diagnostic Test Results:  Labs reviewed in Epic        Assessment & Plan     1. SHIVANI (generalized anxiety disorder)  After review I elected to refill the alprazolam today.  Use of this type of substance was reviewed with the patient.  A controlled substance agreement was completed today.  I elected not to do urine drug screening.   was checked and appropriate.  Follow-up is recommended every 6 months for recheck on this and he may contact us in the interim for refill.  - ALPRAZolam (XANAX) 0.25 MG tablet; Take 1 tablet (0.25 mg) by mouth 3 times daily as needed for anxiety  Dispense: 60 tablet; Refill: 3    2. Hypertension goal BP (blood pressure) < 140/90  Under fair control today, better with the recheck, which patient states is usual for him.  Lisinopril was refilled for 1 year and recheck BMP today.  Suggested following up for routine physical in 6 months.  - BASIC METABOLIC PANEL  - lisinopril (ZESTRIL) 20 MG tablet; Take 1 tablet (20 mg) by mouth daily  Dispense: 90 tablet; Refill: 3    3. Major depression in complete remission (H)  Currently in remission.  Patient reports historically that the Prozac was very helpful for this, but not the anxiety.    4. Morbid obesity (H)  Diet briefly discussed.       BMI:   Estimated body mass index is 39.99 kg/m  as calculated from the following:    Height as of this encounter: 1.727 m (5' 8\").    Weight as of this encounter: 119.3 kg (263 lb).   Weight management plan: Discussed healthy diet and exercise guidelines            Return in about 6 months (around 9/10/2020) for Physical Exam.    Rayo Toledo MD  [facility]

## 2020-03-10 NOTE — LETTER
[facility]  03/10/20    Patient: Tawanda Ramos  YOB: 1990  Medical Record Number: [id number]  CSN: [id number]                                                                              Non-opioid Controlled Substance Agreement    I understand that my care provider has prescribed a controlled substance to help manage my condition(s). I am taking this medicine to help me function or work. I know this is strong medicine, and that it can cause serious side effects. Controlled substances can be sedating, addicting and may cause a dependency on the drug. They can affect my ability to drive or think, and cause depression. They need to be taken exactly as prescribed. Combining controlled substances with certain medicines or chemicals (such as cocaine, sedatives and tranquilizers, sleeping pills, meth) can be dangerous or even fatal. Also, if I stop controlled substances suddenly, I may have severe withdrawal symptoms.  If not helpful, I may be asked to stop them.    The risks, benefits, and side effects of these medicine(s) were explained to me. I agree that:    1. I will take part in other treatments as advised by my care team. This may be psychiatry or counseling, physical therapy, behavioral therapy, group treatment or a referral to a pain clinic. I will reduce or stop my medicine when my care team tells me to do so.  2. I will take my medicines as prescribed. I will not change the dose or schedule unless my care team tells me to. There will be no refills if I  run out early.   I may be contactedwithout warning and asked to complete a urine drug test or pill count at any time.   3. I will keep all my appointments, and understand this is part of the monitoring of controlled substances. My care team may require an office visit for EVERY controlled substance refill. If I miss appointments or don t follow instructions, my care team may stop my medicine.  4. I will not ask other  providers to prescribe controlled substances, and I will not accept controlled substances from other people. If I need another prescribed controlled substance for a new reason, I will tell my care team within 1 business day.  5. I will use one pharmacy to fill all of my controlled substance prescriptions, and it is up to me to make sure that I do not run out of my medicines on weekends or holidays. If my care team is willing to refill my controlled substance prescription without a visit, I must request refills only during office hours, refills may take up to 3 days to process, and it may take up to 5 to 7 days for my medicine to be mailed and ready at my pharmacy. Prescriptions will not be mailed anywhere except my pharmacy.    6. I am responsible for my prescriptions. If the medicine/prescription is lost or stolen, it will not be replaced. I also agree not to share controlled substance medicines with anyone.              [facility]  03/10/20  Patient:  Tawanda Ramos  YOB: 1990  Medical Record Number: [id number]  CSN: [id number]    7. I agree to not use ANY illegal or recreational drugs. This includes marijuana, cocaine, bath salts or other drugs. I agree not to use alcohol unless my care team says I may. I agree to give urine samples whenever asked. If I don t give a urine sample, the care team may stop my medicine.    8. If I enroll in the [place] Medical Marijuana program, I will tell my care team. I will also sign an agreement to share my medical records with my care team.    9. I will bring in my list of medicines (or my medicine bottles) each time I come to the clinic.   10. I will tell my care team right away if I become pregnant or have a new medical problem treated outside of my regular clinic.  11. I understand that this medicine can affect my thinking and judgment. It may be unsafe for me to drive, use machinery and do dangerous tasks. I will not do any of these  things until I know how the medicine affects me. If my dose changes, I will wait to see how it affects me. I will contact my care team if I have concerns about medicine side effects.    I understand that if I do not follow any of the conditions above, my prescriptions or treatment may be stopped.      I agree that my provider, clinic care team, and pharmacy may work with any city, state or federal law enforcement agency that investigates the misuse, sale, or other diversion of my controlled medicine. I will allow my provider to discuss my care with or share a copy of this agreement with any other treating provider, pharmacy or emergency room where I receive care. I agree to give up (waive) any right of privacy or confidentiality with respect to these consents.   I have read this agreement and have asked questions about anything I did not understand.    ____________________________________________________    ____________  ________  Patient signature                                                         Date      Time    ____________________________________________________     ____________  ________  Witness                                                          Date      Time    ____________________________________________________  Provider signature

## 2020-03-11 ASSESSMENT — ANXIETY QUESTIONNAIRES: GAD7 TOTAL SCORE: 4

## 2020-05-28 ENCOUNTER — NURSE TRIAGE (OUTPATIENT)
Dept: NURSING | Facility: CLINIC | Age: 30
End: 2020-05-28

## 2020-05-28 NOTE — TELEPHONE ENCOUNTER
Brother has been in contact with someone who tested positive for COVID and the patient lives with him.    He is not being told that he needs to be tested by his employer.    Currently has no symptoms    Has HTN and is obese      COVID 19 Nurse Triage Plan/Patient Instructions    Please be aware that novel coronavirus (COVID-19) may be circulating in the community. If you develop symptoms such as fever, cough, or SOB or if you have concerns about the presence of another infection including coronavirus (COVID-19), please contact your health care provider or visit [email or web address].     Disposition/Instructions    Additional COVID19 information to add for patients.     Additional General Information About COVID-19    Whether or not you've been tested for COVID-19    Stay home and away from others (self-isolate) until:    At least 10 days have passed since your symptoms started. And     You've had no fever--and no medicine that reduces fever--for 3 full days (72 hours). And      Your other symptoms have resolved (gotten better).     During this time:    Stay in your own room (and use your own bathroom), if you can.    Stay away from others in your home. No hugging, kissing or shaking hands.    No visitors.    Don't go to work, school or anywhere else.     Clean  high touch  surfaces often (doorknobs, counters, handles, etc.). Use a household cleaning spray or wipes.    Cover your mouth and nose with a mask, tissue or wash cloth to avoid spreading germs.    Wash your hands and face often. Use soap and water.    For more tips, go to https://www.cdc.gov/coronavirus/2019-ncov/downloads/10Things.pdf.    How can I take care of myself?    1. Get lots of rest. Drink extra fluids (unless a doctor has told you not to).     2. Take Tylenol (acetaminophen) for fever or pain. If you have liver or kidney problems, ask your family doctor if it's okay to take Tylenol.     Adults can take either:     650 mg (two 325 mg pills) every 4 to 6  hours, or     1,000 mg (two 500 mg pills) every 8 hours as needed.     Note: Don't take more than 3,000 mg in one day.   Acetaminophen is found in many medicines (both prescribed and over-the-counter medicines). Read all labels to be sure you don't take too much.   For children, check the Tylenol bottle for the right dose. The dose is based on  the child's age or weight.    3. If you have other health problems (like cancer, heart failure, an organ transplant or severe kidney disease): Call your specialty clinic if you don't feel better in the next 2 days.    4. Know when to call 911: If your breathing is so bad that it keeps you from doing normal activities, call 911 or go to the emergency room. Tell them that you've been staying home and may have COVID-19..      What are the symptoms of COVID-19?     The most common symptoms are cough, fever and trouble breathing.     Less common symptoms include body aches, chills, diarrhea (loose, watery poops), fatigue (feeling very tired), headache, runny nose, sore throat and loss of smell.     COVID-19 can cause severe coughing (bronchitis) and lung infection (pneumonia).    How does it spread?     The virus may spread when a person coughs or sneezes into the air. The virus can travel about 6 feet this way, and it can live on surfaces.      Common  (household disinfectants) will kill the virus.    Who is at risk?  Anyone can catch COVID-19 if they're around someone who has the virus.    How can others protect themselves?     Stay away from people who have COVID-19 (or symptoms of COVID-19).    Wash hands often with soap and water. Or, use hand  with at least 60% alcohol.    Avoid touching the eyes, nose or mouth.     Wear a face mask when you go out in public, when sick or when caring for a sick person.      For more about COVID-19 and caring for yourself at home, please visit the CDC website at  https://www.cdc.gov/coronavirus/2019-ncov/about/steps-when-sick.html.     To learn about care at [facility] [place] [facility], go to [email or web address].    Below are the COVID-19 hotlines at the [place] [facility] ([facility]). Interpreters are available.     For health questions: Call 039-431-7447 or 1-197.833.3144 (7 a.m. to 7 p.m.)    For questions about schools and childcare: Call 141-205-0292 or 1-525.624.4663 (7 a.m. to 7 p.m.)      Thank you for limiting contact with others, wearing a simple mask to cover your cough, practice good hand hygiene habits and accessing our virtual services where possible to limit the spread of this virus.    For more information about COVID19 and options for caring for yourself at home, please visit the CDC website at https://www.cdc.gov/coronavirus/2019-ncov/about/steps-when-sick.html  For more options for care at [facility] [place] [facility], please visit our website at [email or web address]    For more information, please use the [place] [facility] COVID-19 Website: [email or web address]/index.html  [place] [facility] ([facility]) COVID-19 Hotlines (Interpreters available):      Health questions: Phone Number: 213.947.8706 or 1-249.943.6163 and Hours: 7 a.m. to 7 p.m.    Schools and  questions: Phone Number: 132.562.8304 or 1-106.647.8234 and Hours 7 a.m. to 7 p.m.            Celia Muniz RN        Reason for Disposition    [1] No COVID-19 EXPOSURE BUT [2] living with someone who was exposed and who has no fever or cough    Additional Information    Negative: SEVERE difficulty breathing (e.g., struggling for each breath, speaks in single words)    Negative: Bluish (or gray) lips or face now    Negative: Sounds like a life-threatening emergency to the triager    Negative: [1] Difficulty breathing occurs AND [2] within 14 days of COVID-19 EXPOSURE (Close Contact)    Negative:  Patient sounds very sick or weak to the triager    Negative: [1] Fever (or feeling feverish) OR cough AND [2] within 14 Days of COVID-19 EXPOSURE (Close Contact)    Negative: [1] Fever (or feeling feverish) OR cough occurs AND [2] within 14 days of travel from another country (international travel)    Negative: [1] Fever (or feeling feverish) OR cough occurs AND [2] within 14 days of travel from a city or area with major community spread    Negative: [1] Fever (or feeling feverish) OR cough occurs AND [2] living in area with major community spread AND [3] testing being done in the community for symptoms    Negative: [1] Mild body aches, chills, diarrhea, headache, runny nose, or sore throat AND [2] within 14 days of COVID-19 EXPOSURE    Negative: [1] COVID-19 EXPOSURE within last 14 days AND [2] NO cough, fever, or breathing difficulty AND [3] exposed 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

## 2020-06-15 ENCOUNTER — MYC MEDICAL ADVICE (OUTPATIENT)
Dept: FAMILY MEDICINE | Facility: CLINIC | Age: 30
End: 2020-06-15

## 2020-06-15 ENCOUNTER — MYC REFILL (OUTPATIENT)
Dept: FAMILY MEDICINE | Facility: CLINIC | Age: 30
End: 2020-06-15

## 2020-06-15 DIAGNOSIS — F41.1 GAD (GENERALIZED ANXIETY DISORDER): ICD-10-CM

## 2020-06-15 DIAGNOSIS — M54.6 ACUTE BILATERAL THORACIC BACK PAIN: ICD-10-CM

## 2020-06-15 DIAGNOSIS — F41.1 GAD (GENERALIZED ANXIETY DISORDER): Primary | ICD-10-CM

## 2020-06-15 RX ORDER — ALPRAZOLAM 0.25 MG
0.25 TABLET ORAL 3 TIMES DAILY PRN
Qty: 60 TABLET | Refills: 3 | Status: CANCELLED | OUTPATIENT
Start: 2020-06-15

## 2020-06-15 RX ORDER — CYCLOBENZAPRINE HCL 10 MG
10 TABLET ORAL 3 TIMES DAILY PRN
Qty: 30 TABLET | Refills: 0 | Status: SHIPPED | OUTPATIENT
Start: 2020-06-15 | End: 2020-12-14

## 2020-06-15 RX ORDER — DICLOFENAC SODIUM 75 MG/1
75 TABLET, DELAYED RELEASE ORAL 2 TIMES DAILY PRN
Qty: 30 TABLET | Refills: 0 | Status: SHIPPED | OUTPATIENT
Start: 2020-06-15 | End: 2020-12-14

## 2020-06-15 RX ORDER — ALPRAZOLAM 0.25 MG
0.25 TABLET ORAL 3 TIMES DAILY PRN
Qty: 60 TABLET | Refills: 3 | Status: SHIPPED | OUTPATIENT
Start: 2020-06-15 | End: 2020-09-30

## 2020-07-21 NOTE — TELEPHONE ENCOUNTER
[employer] message sent to patient.  Mikaela ELIZABETHN,RN  [facility]     Left a voicemail message for patient to call us back at [phone]

## 2020-09-30 ENCOUNTER — MYC REFILL (OUTPATIENT)
Dept: FAMILY MEDICINE | Facility: CLINIC | Age: 30
End: 2020-09-30

## 2020-09-30 DIAGNOSIS — F41.1 GAD (GENERALIZED ANXIETY DISORDER): ICD-10-CM

## 2020-09-30 RX ORDER — ALPRAZOLAM 0.25 MG
0.25 TABLET ORAL 3 TIMES DAILY PRN
Qty: 60 TABLET | Refills: 3 | Status: SHIPPED | OUTPATIENT
Start: 2020-09-30 | End: 2020-12-30

## 2020-09-30 NOTE — TELEPHONE ENCOUNTER
Requested Prescriptions   Pending Prescriptions Disp Refills    ALPRAZolam (XANAX) 0.25 MG tablet 60 tablet 3     Sig: Take 1 tablet (0.25 mg) by mouth 3 times daily as needed for anxiety       There is no refill protocol information for this order        Routing refill request to provider for review/approval because:  Drug not on the [facility] refill protocol   Mikaela Sweeney RN,BSN  [facility]

## 2020-12-08 ENCOUNTER — MYC MEDICAL ADVICE (OUTPATIENT)
Dept: FAMILY MEDICINE | Facility: CLINIC | Age: 30
End: 2020-12-08

## 2020-12-11 NOTE — PROGRESS NOTES
SUBJECTIVE:   CC: Tawanda Ramos is an 30 year old male who presents for preventive health visit.       Patient has been advised of split billing requirements and indicates understanding: Yes  Healthy Habits:    Do you get at least three servings of calcium containing foods daily (dairy, green leafy vegetables, etc.)? yes    Amount of exercise or daily activities, outside of work: 4-5 days a week, 30-60 min.     Problems taking medications regularly No    Medication side effects: No    Have you had an eye exam in the past two years? Not sure.     Do you see a dentist twice per year? no    Do you have sleep apnea, excessive snoring or daytime drowsiness?no      Patient is here today for his routine annual physical.  He does not need any refills on medications.  He does use alprazolam for the treatment of anxiety and estimates he takes it 1-2 times a day for approximately 4 to 5 days out of the week so 60 tablets tends to last him 5 to 6 weeks.  He just picked up his last refill on that medication.  He is fasting today.  He did not bring up any other concerns today.  Blood pressures at home tend to run 130-140/80-90.  He denies any side effects related to the lisinopril.    PHQ 6/13/2019 10/24/2019 12/14/2020   PHQ-9 Total Score 2 1 1   Q9: Thoughts of better off dead/self-harm past 2 weeks Not at all Not at all Not at all     SHIVANI-7 SCORE 10/24/2019 3/10/2020 12/14/2020   Total Score - - -   Total Score 3 4 2           Today's PHQ-2 Score:   PHQ-2 ( 1999 Pfizer) 4/11/2016 3/21/2016   Q1: Little interest or pleasure in doing things 0 0   Q2: Feeling down, depressed or hopeless 0 0   PHQ-2 Score 0 0       Abuse: Current or Past(Physical, Sexual or Emotional)- No  Do you feel safe in your environment? Yes        Social History     Tobacco Use     Smoking status: Never Smoker     Smokeless tobacco: Never Used   Substance Use Topics     Alcohol use: Yes     Frequency: 2-3 times a week     Drinks per session: 1 or 2      Comment: maybe 5 drinks per week     If you drink alcohol do you typically have >3 drinks per day or >7 drinks per week? No                      Last PSA: No results found for: PSA    Reviewed orders with patient. Reviewed health maintenance and updated orders accordingly - Yes  Patient Active Problem List   Diagnosis     CARDIOVASCULAR SCREENING; LDL GOAL LESS THAN 160     Major depression in complete remission (H)     Hypertension goal BP (blood pressure) < 140/90     Fracture of metacarpal of right hand, closed     Obesity (BMI 35.0-39.9) with comorbidity (H)     SHIVANI (generalized anxiety disorder)     Past Surgical History:   Procedure Laterality Date     DENTAL SURGERY      wisdom teeth       Social History     Tobacco Use     Smoking status: Never Smoker     Smokeless tobacco: Never Used   Substance Use Topics     Alcohol use: Yes     Frequency: 2-3 times a week     Drinks per session: 1 or 2     Comment: maybe 5 drinks per week     Family History   Problem Relation Age of Onset     Asthma Mother      Hypertension Father      Anxiety Disorder Father      Cerebrovascular Disease Maternal Grandmother      Arthritis Maternal Grandmother      Prostate Cancer Maternal Grandfather      Arthritis Maternal Grandfather      Cancer Maternal Grandfather      Diabetes Paternal Grandmother      Cerebrovascular Disease Paternal Grandfather      Cancer Paternal Grandfather      Hypertension Brother      Anxiety Disorder Brother      Anxiety Disorder Brother          Current Outpatient Medications   Medication Sig Dispense Refill     ALPRAZolam (XANAX) 0.25 MG tablet Take 1 tablet (0.25 mg) by mouth 3 times daily as needed for anxiety 60 tablet 3     lisinopril (ZESTRIL) 20 MG tablet Take 1 tablet (20 mg) by mouth daily 90 tablet 3     Allergies   Allergen Reactions     Codeine        Reviewed and updated as needed this visit by clinical staff  Tobacco  Allergies  Meds  Problems  Med Hx  Surg Hx  Fam Hx  Soc Hx           Reviewed and updated as needed this visit by Provider  Tobacco   Meds  Problems  Med Hx  Surg Hx  Fam Hx  Soc Hx           ROS:  CONSTITUTIONAL: NEGATIVE for fever, chills, change in weight  INTEGUMENTARY/SKIN: NEGATIVE for worrisome rashes, moles or lesions  EYES: NEGATIVE for vision changes or irritation  ENT: NEGATIVE for ear, mouth and throat problems  RESP: NEGATIVE for significant cough or SOB  CV: NEGATIVE for chest pain, palpitations or peripheral edema  GI: NEGATIVE for nausea, abdominal pain, heartburn, or change in bowel habits   male: negative for dysuria, hematuria, decreased urinary stream, erectile dysfunction, urethral discharge  MUSCULOSKELETAL: NEGATIVE for significant arthralgias or myalgia  NEURO: NEGATIVE for weakness, dizziness or paresthesias  PSYCHIATRIC: NEGATIVE for changes in mood or affect    OBJECTIVE:   BP (!) 144/80 (BP Location: Right arm, Patient Position: Sitting, Cuff Size: Adult Large)   Pulse 84   Temp 98.8  F (37.1  C) (Temporal)   Resp 16   Wt 130.4 kg (287 lb 8 oz)   SpO2 96%   BMI 43.71 kg/m    EXAM:  GENERAL: healthy, alert and no distress  EYES: Eyes grossly normal to inspection, PERRL and conjunctivae and sclerae normal  HENT: ear canals and TM's normal, nose and mouth without ulcers or lesions  NECK: no adenopathy, no asymmetry, masses, or scars and thyroid normal to palpation  RESP: lungs clear to auscultation - no rales, rhonchi or wheezes  CV: regular rate and rhythm, normal S1 S2, no S3 or S4, no murmur, click or rub, no peripheral edema and peripheral pulses strong  ABDOMEN: soft, nontender, no hepatosplenomegaly, no masses and bowel sounds normal   (male): normal male genitalia without lesions or urethral discharge, no hernia  MS: no gross musculoskeletal defects noted, no edema  SKIN: no suspicious lesions or rashes  NEURO: Normal strength and tone, mentation intact and speech normal  PSYCH: mentation appears normal, affect  "normal/bright    Diagnostic Test Results:  Labs reviewed in Epic    ASSESSMENT/PLAN:   1. Routine general medical examination at a health care facility  Routine health issues were reviewed with the patient.  Lipid panel will be checked today.  Follow-up is recommended in 1 year for routine physical.  - Lipid panel reflex to direct LDL Fasting    2. Screening for viral disease   One-time screening test for hepatitis C was discussed and done today.  - Hepatitis C antibody    3. SHIVANI (generalized anxiety disorder)  Anxiety is under control at this time.  Uses alprazolam as needed.  This is something that is worked really well for his family and its been a chronic medication that he has been on so I did not feel that there were indications for change at this time.  He does seem to be a reasonably appropriate candidate for as needed benzo therapy based on my previous history with him, review of previous care provided, and review of previous medication trials.    4. Hypertension goal BP (blood pressure) < 140/90  Blood pressure was borderline today.  Will check BMP.  Recommend continue to monitor at home and notify me if it remains elevated and we could either add some hydrochlorothiazide to the lisinopril or increase the dose of the lisinopril.  - Basic metabolic panel  (Ca, Cl, CO2, Creat, Gluc, K, Na, BUN)    Patient has been advised of split billing requirements and indicates understanding: No  COUNSELING:  Reviewed preventive health counseling, as reflected in patient instructions       Regular exercise       Healthy diet/nutrition       Vision screening       Hearing screening       Consider Hep C screening for all patients one time for ages 18-79 years    Estimated body mass index is 43.71 kg/m  as calculated from the following:    Height as of 3/10/20: 1.727 m (5' 8\").    Weight as of this encounter: 130.4 kg (287 lb 8 oz).    Weight management plan: Discussed healthy diet and exercise guidelines    He reports that " he has never smoked. He has never used smokeless tobacco.      Counseling Resources:  ATP IV Guidelines  Pooled Cohorts Equation Calculator  FRAX Risk Assessment  ICSI Preventive Guidelines  Dietary Guidelines for Americans, 2010  USDA's MyPlate  ASA Prophylaxis  Lung CA Screening    aRyo Toledo MD  [facility]

## 2020-12-13 ENCOUNTER — HEALTH MAINTENANCE LETTER (OUTPATIENT)
Age: 30
End: 2020-12-13

## 2020-12-14 ENCOUNTER — OFFICE VISIT (OUTPATIENT)
Dept: FAMILY MEDICINE | Facility: CLINIC | Age: 30
End: 2020-12-14
Payer: COMMERCIAL

## 2020-12-14 VITALS
RESPIRATION RATE: 16 BRPM | HEART RATE: 84 BPM | SYSTOLIC BLOOD PRESSURE: 144 MMHG | TEMPERATURE: 98.8 F | BODY MASS INDEX: 43.71 KG/M2 | DIASTOLIC BLOOD PRESSURE: 80 MMHG | OXYGEN SATURATION: 96 % | WEIGHT: 287.5 LBS

## 2020-12-14 DIAGNOSIS — I10 HYPERTENSION GOAL BP (BLOOD PRESSURE) < 140/90: ICD-10-CM

## 2020-12-14 DIAGNOSIS — Z11.59 SCREENING FOR VIRAL DISEASE: ICD-10-CM

## 2020-12-14 DIAGNOSIS — F41.1 GAD (GENERALIZED ANXIETY DISORDER): ICD-10-CM

## 2020-12-14 DIAGNOSIS — Z00.00 ROUTINE GENERAL MEDICAL EXAMINATION AT A HEALTH CARE FACILITY: Primary | ICD-10-CM

## 2020-12-14 LAB
ANION GAP SERPL CALCULATED.3IONS-SCNC: 5 MMOL/L (ref 3–14)
BUN SERPL-MCNC: 6 MG/DL (ref 7–30)
CALCIUM SERPL-MCNC: 8.9 MG/DL (ref 8.5–10.1)
CHLORIDE SERPL-SCNC: 105 MMOL/L (ref 94–109)
CHOLEST SERPL-MCNC: 298 MG/DL
CO2 SERPL-SCNC: 26 MMOL/L (ref 20–32)
CREAT SERPL-MCNC: 0.88 MG/DL (ref 0.66–1.25)
GFR SERPL CREATININE-BSD FRML MDRD: >90 ML/MIN/{1.73_M2}
GLUCOSE SERPL-MCNC: 116 MG/DL (ref 70–99)
HDLC SERPL-MCNC: 54 MG/DL
LDLC SERPL CALC-MCNC: 172 MG/DL
NONHDLC SERPL-MCNC: 241 MG/DL
POTASSIUM SERPL-SCNC: 4.4 MMOL/L (ref 3.4–5.3)
SODIUM SERPL-SCNC: 136 MMOL/L (ref 133–144)
TRIGL SERPL-MCNC: 350 MG/DL

## 2020-12-14 PROCEDURE — 80061 LIPID PANEL: CPT | Performed by: FAMILY MEDICINE

## 2020-12-14 PROCEDURE — 99395 PREV VISIT EST AGE 18-39: CPT | Performed by: FAMILY MEDICINE

## 2020-12-14 PROCEDURE — 86803 HEPATITIS C AB TEST: CPT | Performed by: FAMILY MEDICINE

## 2020-12-14 PROCEDURE — 80048 BASIC METABOLIC PNL TOTAL CA: CPT | Performed by: FAMILY MEDICINE

## 2020-12-14 PROCEDURE — 36415 COLL VENOUS BLD VENIPUNCTURE: CPT | Performed by: FAMILY MEDICINE

## 2020-12-14 SDOH — ECONOMIC STABILITY: FOOD INSECURITY: WITHIN THE PAST 12 MONTHS, THE FOOD YOU BOUGHT JUST DIDN'T LAST AND YOU DIDN'T HAVE MONEY TO GET MORE.: NOT ASKED

## 2020-12-14 SDOH — ECONOMIC STABILITY: FOOD INSECURITY: WITHIN THE PAST 12 MONTHS, YOU WORRIED THAT YOUR FOOD WOULD RUN OUT BEFORE YOU GOT MONEY TO BUY MORE.: NOT ASKED

## 2020-12-14 SDOH — ECONOMIC STABILITY: TRANSPORTATION INSECURITY
IN THE PAST 12 MONTHS, HAS LACK OF TRANSPORTATION KEPT YOU FROM MEETINGS, WORK, OR FROM GETTING THINGS NEEDED FOR DAILY LIVING?: NOT ASKED

## 2020-12-14 SDOH — HEALTH STABILITY: MENTAL HEALTH: HOW OFTEN DO YOU HAVE A DRINK CONTAINING ALCOHOL?: 2-3 TIMES A WEEK

## 2020-12-14 SDOH — HEALTH STABILITY: MENTAL HEALTH: HOW OFTEN DO YOU HAVE 6 OR MORE DRINKS ON ONE OCCASION?: NOT ASKED

## 2020-12-14 SDOH — ECONOMIC STABILITY: TRANSPORTATION INSECURITY
IN THE PAST 12 MONTHS, HAS THE LACK OF TRANSPORTATION KEPT YOU FROM MEDICAL APPOINTMENTS OR FROM GETTING MEDICATIONS?: NOT ASKED

## 2020-12-14 SDOH — ECONOMIC STABILITY: INCOME INSECURITY: HOW HARD IS IT FOR YOU TO PAY FOR THE VERY BASICS LIKE FOOD, HOUSING, MEDICAL CARE, AND HEATING?: NOT ASKED

## 2020-12-14 ASSESSMENT — ANXIETY QUESTIONNAIRES
3. WORRYING TOO MUCH ABOUT DIFFERENT THINGS: NOT AT ALL
1. FEELING NERVOUS, ANXIOUS, OR ON EDGE: SEVERAL DAYS
7. FEELING AFRAID AS IF SOMETHING AWFUL MIGHT HAPPEN: NOT AT ALL
2. NOT BEING ABLE TO STOP OR CONTROL WORRYING: NOT AT ALL
6. BECOMING EASILY ANNOYED OR IRRITABLE: NOT AT ALL
5. BEING SO RESTLESS THAT IT IS HARD TO SIT STILL: NOT AT ALL
IF YOU CHECKED OFF ANY PROBLEMS ON THIS QUESTIONNAIRE, HOW DIFFICULT HAVE THESE PROBLEMS MADE IT FOR YOU TO DO YOUR WORK, TAKE CARE OF THINGS AT HOME, OR GET ALONG WITH OTHER PEOPLE: NOT DIFFICULT AT ALL
GAD7 TOTAL SCORE: 2

## 2020-12-14 ASSESSMENT — PATIENT HEALTH QUESTIONNAIRE - PHQ9
SUM OF ALL RESPONSES TO PHQ QUESTIONS 1-9: 1
5. POOR APPETITE OR OVEREATING: SEVERAL DAYS

## 2020-12-15 LAB — HCV AB SERPL QL IA: NONREACTIVE

## 2020-12-15 ASSESSMENT — ANXIETY QUESTIONNAIRES: GAD7 TOTAL SCORE: 2

## 2020-12-28 ENCOUNTER — MYC MEDICAL ADVICE (OUTPATIENT)
Dept: FAMILY MEDICINE | Facility: CLINIC | Age: 30
End: 2020-12-28

## 2020-12-29 DIAGNOSIS — F41.1 GAD (GENERALIZED ANXIETY DISORDER): ICD-10-CM

## 2020-12-29 NOTE — TELEPHONE ENCOUNTER
Response sent to patient via [facility].     Albino Love RN   [facility]

## 2020-12-30 RX ORDER — ALPRAZOLAM 0.25 MG
0.25 TABLET ORAL 3 TIMES DAILY PRN
Qty: 60 TABLET | Refills: 3 | Status: SHIPPED | OUTPATIENT
Start: 2020-12-30 | End: 2021-05-06

## 2020-12-30 NOTE — TELEPHONE ENCOUNTER
Requested Prescriptions   Pending Prescriptions Disp Refills     ALPRAZolam (XANAX) 0.25 MG tablet [Pharmacy Med Name: ALPRAZOLAM 0.25 MG TABLET] 60 tablet 3     Sig: TAKE 1 TABLET (0.25 MG) BY MOUTH 3 TIMES DAILY AS NEEDED FOR ANXIETY       There is no refill protocol information for this order              Last Written Prescription Date:  9/30/20  Last Fill Quantity: 60,   # refills: 3  Last Office Visit: 12/14/20  Future Office visit:       Routing refill request to provider for review/approval because:  Drug not on the [facility], [facility] or [facility] refill protocol or controlled substance

## 2021-03-17 DIAGNOSIS — I10 HYPERTENSION GOAL BP (BLOOD PRESSURE) < 140/90: ICD-10-CM

## 2021-03-17 RX ORDER — LISINOPRIL 20 MG/1
TABLET ORAL
Qty: 90 TABLET | Refills: 3 | Status: SHIPPED | OUTPATIENT
Start: 2021-03-17 | End: 2022-02-04

## 2021-04-29 ENCOUNTER — APPOINTMENT (OUTPATIENT)
Dept: GENERAL RADIOLOGY | Facility: CLINIC | Age: 31
End: 2021-04-29
Attending: EMERGENCY MEDICINE
Payer: COMMERCIAL

## 2021-04-29 ENCOUNTER — MYC MEDICAL ADVICE (OUTPATIENT)
Dept: FAMILY MEDICINE | Facility: CLINIC | Age: 31
End: 2021-04-29

## 2021-04-29 ENCOUNTER — HOSPITAL ENCOUNTER (EMERGENCY)
Facility: CLINIC | Age: 31
Discharge: HOME | End: 2021-04-29
Attending: EMERGENCY MEDICINE | Admitting: EMERGENCY MEDICINE
Payer: COMMERCIAL

## 2021-04-29 ENCOUNTER — NURSE TRIAGE (OUTPATIENT)
Dept: FAMILY MEDICINE | Facility: CLINIC | Age: 31
End: 2021-04-29

## 2021-04-29 VITALS
TEMPERATURE: 98.6 F | SYSTOLIC BLOOD PRESSURE: 137 MMHG | OXYGEN SATURATION: 97 % | DIASTOLIC BLOOD PRESSURE: 75 MMHG | HEART RATE: 95 BPM | RESPIRATION RATE: 16 BRPM

## 2021-04-29 DIAGNOSIS — R50.9 HYPERTHERMIA-INDUCED DEFECT: ICD-10-CM

## 2021-04-29 DIAGNOSIS — U07.1 2019 NOVEL CORONAVIRUS DISEASE (COVID-19): ICD-10-CM

## 2021-04-29 LAB
ALBUMIN SERPL-MCNC: 4 G/DL (ref 3.4–5)
ALP SERPL-CCNC: 36 U/L (ref 40–150)
ALT SERPL W P-5'-P-CCNC: 63 U/L (ref 0–70)
ANION GAP SERPL CALCULATED.3IONS-SCNC: 9 MMOL/L (ref 3–14)
AST SERPL W P-5'-P-CCNC: 34 U/L (ref 0–45)
BASE EXCESS BLDV CALC-SCNC: 2.3 MMOL/L
BASOPHILS # BLD AUTO: 0 10E9/L (ref 0–0.2)
BASOPHILS NFR BLD AUTO: 0.2 %
BILIRUB SERPL-MCNC: 0.7 MG/DL (ref 0.2–1.3)
BUN SERPL-MCNC: 11 MG/DL (ref 7–30)
CALCIUM SERPL-MCNC: 8.2 MG/DL (ref 8.5–10.1)
CHLORIDE SERPL-SCNC: 102 MMOL/L (ref 94–109)
CO2 SERPL-SCNC: 27 MMOL/L (ref 20–32)
CREAT SERPL-MCNC: 0.86 MG/DL (ref 0.66–1.25)
DIFFERENTIAL METHOD BLD: ABNORMAL
EOSINOPHIL # BLD AUTO: 0 10E9/L (ref 0–0.7)
EOSINOPHIL NFR BLD AUTO: 0.2 %
ERYTHROCYTE [DISTWIDTH] IN BLOOD BY AUTOMATED COUNT: 13.1 % (ref 10–15)
GFR SERPL CREATININE-BSD FRML MDRD: >90 ML/MIN/{1.73_M2}
GLUCOSE SERPL-MCNC: 105 MG/DL (ref 70–99)
HCO3 BLDV-SCNC: 29 MMOL/L (ref 21–28)
HCT VFR BLD AUTO: 44.7 % (ref 40–53)
HGB BLD-MCNC: 15.3 G/DL (ref 13.3–17.7)
IMM GRANULOCYTES # BLD: 0 10E9/L (ref 0–0.4)
IMM GRANULOCYTES NFR BLD: 0.8 %
INR PPP: 1 (ref 0.86–1.14)
INTERPRETATION ECG - MUSE: NORMAL
LYMPHOCYTES # BLD AUTO: 1.2 10E9/L (ref 0.8–5.3)
LYMPHOCYTES NFR BLD AUTO: 24.7 %
MAGNESIUM SERPL-MCNC: 2.5 MG/DL (ref 1.6–2.3)
MCH RBC QN AUTO: 31 PG (ref 26.5–33)
MCHC RBC AUTO-ENTMCNC: 34.2 G/DL (ref 31.5–36.5)
MCV RBC AUTO: 91 FL (ref 78–100)
MONOCYTES # BLD AUTO: 0.3 10E9/L (ref 0–1.3)
MONOCYTES NFR BLD AUTO: 5.6 %
NEUTROPHILS # BLD AUTO: 3.4 10E9/L (ref 1.6–8.3)
NEUTROPHILS NFR BLD AUTO: 68.5 %
NRBC # BLD AUTO: 0 10*3/UL
NRBC BLD AUTO-RTO: 0 /100
O2/TOTAL GAS SETTING VFR VENT: 21 %
PCO2 BLDV: 50 MM HG (ref 40–50)
PH BLDV: 7.37 PH (ref 7.32–7.43)
PLATELET # BLD AUTO: 139 10E9/L (ref 150–450)
PO2 BLDV: 18 MM HG (ref 25–47)
POTASSIUM SERPL-SCNC: 4 MMOL/L (ref 3.4–5.3)
PROT SERPL-MCNC: 7.4 G/DL (ref 6.8–8.8)
RBC # BLD AUTO: 4.94 10E12/L (ref 4.4–5.9)
SODIUM SERPL-SCNC: 138 MMOL/L (ref 133–144)
TROPONIN I SERPL-MCNC: <0.015 UG/L (ref 0–0.04)
WBC # BLD AUTO: 5 10E9/L (ref 4–11)

## 2021-04-29 PROCEDURE — 84484 ASSAY OF TROPONIN QUANT: CPT | Performed by: EMERGENCY MEDICINE

## 2021-04-29 PROCEDURE — 99285 EMERGENCY DEPT VISIT HI MDM: CPT | Mod: 25

## 2021-04-29 PROCEDURE — 93005 ELECTROCARDIOGRAM TRACING: CPT

## 2021-04-29 PROCEDURE — 99285 EMERGENCY DEPT VISIT HI MDM: CPT | Performed by: EMERGENCY MEDICINE

## 2021-04-29 PROCEDURE — 80053 COMPREHEN METABOLIC PANEL: CPT | Performed by: EMERGENCY MEDICINE

## 2021-04-29 PROCEDURE — 96360 HYDRATION IV INFUSION INIT: CPT

## 2021-04-29 PROCEDURE — 87040 BLOOD CULTURE FOR BACTERIA: CPT | Mod: XS | Performed by: EMERGENCY MEDICINE

## 2021-04-29 PROCEDURE — 82803 BLOOD GASES ANY COMBINATION: CPT | Performed by: EMERGENCY MEDICINE

## 2021-04-29 PROCEDURE — 258N000003 HC RX IP 258 OP 636

## 2021-04-29 PROCEDURE — 71045 X-RAY EXAM CHEST 1 VIEW: CPT

## 2021-04-29 PROCEDURE — 85610 PROTHROMBIN TIME: CPT | Performed by: EMERGENCY MEDICINE

## 2021-04-29 PROCEDURE — 71046 X-RAY EXAM CHEST 2 VIEWS: CPT

## 2021-04-29 PROCEDURE — 83735 ASSAY OF MAGNESIUM: CPT | Performed by: EMERGENCY MEDICINE

## 2021-04-29 PROCEDURE — 85025 COMPLETE CBC W/AUTO DIFF WBC: CPT | Performed by: EMERGENCY MEDICINE

## 2021-04-29 PROCEDURE — 87040 BLOOD CULTURE FOR BACTERIA: CPT | Performed by: EMERGENCY MEDICINE

## 2021-04-29 RX ORDER — SODIUM CHLORIDE 9 MG/ML
INJECTION, SOLUTION INTRAVENOUS
Status: COMPLETED
Start: 2021-04-29 | End: 2021-04-29

## 2021-04-29 RX ADMIN — Medication 500 ML: at 17:34

## 2021-04-29 RX ADMIN — SODIUM CHLORIDE 500 ML: 9 INJECTION, SOLUTION INTRAVENOUS at 17:34

## 2021-04-29 ASSESSMENT — ENCOUNTER SYMPTOMS
FATIGUE: 1
VOMITING: 0
NAUSEA: 0
LIGHT-HEADEDNESS: 0
APPETITE CHANGE: 1
ABDOMINAL PAIN: 0
COUGH: 1
DIARRHEA: 1
DYSURIA: 0
FEVER: 1
SORE THROAT: 0
HEADACHES: 0
SHORTNESS OF BREATH: 1
HEMATURIA: 0

## 2021-04-29 NOTE — ED PROVIDER NOTES
[facility] EMERGENCY DEPARTMENT ([facility])  4/29/21     History     Chief Complaint   Patient presents with     Fever     fever is primary concern; started with fatigue and then fever of 101-102F; took Tylenol today prior to coming to ED     Covid Concern     diagnosed with covid last week; occasional cough, chest congestion, non-productive, SOB with exertion; also diarrhea twice per day     The history is provided by the patient and medical records.     Tawanda Ramos is a 30 year old male with a past medical history significant for obesity, HTN, and recent COVID positive (04/22/2021) who presents here to the Emergency Department due to worsening symptoms of COVID.  Patient reports that he began to feel unwell on April 21.  He felt fatigued and had some nasal congestion.  He tested positive for Covid on April 22.  He has been staying home since then but notes that symptoms have gotten worse.  Particular he has had fevers between 101-102 fairly consistently for the past week.  He is taking about a gram of Tylenol twice a day and only occasionally uses Motrin.  He has some nasal congestion with chest congestion and a nonproductive cough.  He has some shortness of breath with any exertion but denies shortness of breath with rest.  No chest pain.  No abdominal pain, no nausea or vomiting.  He has had looser stools and admits to about 2 episodes of diarrhea daily for the past several days.  He does not have much of an appetite but is trying to eat something daily and is trying to push fluids.  Patient denies any dysuria or hematuria.  He does have a history of hypertension and obesity.  He lives with his brother who also tested positive for Covid recently, though he is doing better.      This part of the medical record was transcribed by Kailey Wesley, Medical Scribe, from a dictation done by Tanvi Raygoza MD.      Past Medical History  Past Medical History:   Diagnosis Date     Anxiety   "    Past Surgical History:   Procedure Laterality Date     DENTAL SURGERY      wisdom teeth     ENT SURGERY  2008    wisdom teeth extractions     ALPRAZolam (XANAX) 0.25 MG tablet  lisinopril (ZESTRIL) 20 MG tablet      Allergies   Allergen Reactions     Codeine      Family History  Family History   Problem Relation Age of Onset     Asthma Mother      Hypertension Father      Anxiety Disorder Father      Cerebrovascular Disease Maternal Grandmother      Arthritis Maternal Grandmother      Prostate Cancer Maternal Grandfather      Arthritis Maternal Grandfather      Cancer Maternal Grandfather      Diabetes Paternal Grandmother      Cerebrovascular Disease Paternal Grandfather      Cancer Paternal Grandfather      Hypertension Brother      Anxiety Disorder Brother      Anxiety Disorder Brother      Social History   Social History     Tobacco Use     Smoking status: Never Smoker     Smokeless tobacco: Never Used   Substance Use Topics     Alcohol use: Yes     Frequency: 2-3 times a week     Drinks per session: 1 or 2     Comment: \"a few drinks a couple times per week but not in the last  10 days\"     Drug use: Not Currently      Past medical history, past surgical history, medications, allergies, family history, and social history were reviewed with the patient. No additional pertinent items.       Review of Systems   Constitutional: Positive for appetite change (decreased), fatigue and fever.   HENT: Positive for congestion. Negative for sore throat.    Respiratory: Positive for cough and shortness of breath (w/ exertion).    Cardiovascular: Negative for chest pain.   Gastrointestinal: Positive for diarrhea. Negative for abdominal pain, nausea and vomiting.   Genitourinary: Negative for dysuria and hematuria.   Skin: Negative for rash.   Neurological: Negative for light-headedness and headaches.   All other systems reviewed and are negative.    A complete review of systems was performed with pertinent positives and " negatives noted in the HPI, and all other systems negative.    Physical Exam   BP: (!) 145/77  Pulse: 96  Temp: 99  F (37.2  C)  Resp: 20  SpO2: 95 %  Physical Exam  Vitals signs and nursing note reviewed.   Constitutional:       Appearance: He is well-developed.      Comments: Obese, alert, cooperative, NAD   HENT:      Head: Normocephalic.   Eyes:      Pupils: Pupils are equal, round, and reactive to light.   Cardiovascular:      Rate and Rhythm: Normal rate and regular rhythm.      Heart sounds: Normal heart sounds. No murmur. No gallop.    Pulmonary:      Effort: Pulmonary effort is normal. No respiratory distress.      Breath sounds: Normal breath sounds. No wheezing or rales.   Abdominal:      General: Bowel sounds are normal. There is no distension.      Palpations: Abdomen is soft.      Tenderness: There is no abdominal tenderness. There is no guarding or rebound.      Comments: Soft, obese, nontender to palpation, normal bowel sounds   Skin:     General: Skin is warm and dry.   Neurological:      Mental Status: He is alert and oriented to person, place, and time.   Psychiatric:         Behavior: Behavior normal.         ED Course      Procedures        The medical record was reviewed and interpreted.  Current labs reviewed and interpreted.  Current images reviewed and interpreted: peripheral opacities consistent with COVID.       Results for orders placed or performed during the hospital encounter of 04/29/21   XR Chest Port 1 View     Status: None (Preliminary result)    Narrative    XR PORTABLE CHEST ONE VIEW  4/29/2021 6:05 PM     HISTORY: COVID positive, shortness of breath, evaluate for pneumonia.    COMPARISON: None.      Impression    IMPRESSION: Shallow inspiration. There appear to be peripheral  opacities, though this may be artifact due to decreased penetration.  Heart size is normal. No obvious pleural effusion or pneumothorax.  Would consider dedicated PA and lateral radiographs.   Chest XR,  PA  & LAT     Status: None    Narrative    CHEST TWO VIEWS 4/29/2021 7:12 PM     HISTORY: SOB, COVID+, eval for pneumonia    COMPARISON: 4/29/2021 at 1750 hours      Impression    IMPRESSION: No pleural fluid or pneumothorax. Heterogeneous pulmonary  opacities are more conspicuous on this study than the prior study due  to differences in technique. These are concerning for  multifocal/atypical infection. Normal size of the heart.    LESTER J FAHRNER, MD   CBC with platelets differential     Status: Abnormal   Result Value Ref Range    WBC 5.0 4.0 - 11.0 10e9/L    RBC Count 4.94 4.4 - 5.9 10e12/L    Hemoglobin 15.3 13.3 - 17.7 g/dL    Hematocrit 44.7 40.0 - 53.0 %    MCV 91 78 - 100 fl    MCH 31.0 26.5 - 33.0 pg    MCHC 34.2 31.5 - 36.5 g/dL    RDW 13.1 10.0 - 15.0 %    Platelet Count 139 (L) 150 - 450 10e9/L    Diff Method Automated Method     % Neutrophils 68.5 %    % Lymphocytes 24.7 %    % Monocytes 5.6 %    % Eosinophils 0.2 %    % Basophils 0.2 %    % Immature Granulocytes 0.8 %    Nucleated RBCs 0 0 /100    Absolute Neutrophil 3.4 1.6 - 8.3 10e9/L    Absolute Lymphocytes 1.2 0.8 - 5.3 10e9/L    Absolute Monocytes 0.3 0.0 - 1.3 10e9/L    Absolute Eosinophils 0.0 0.0 - 0.7 10e9/L    Absolute Basophils 0.0 0.0 - 0.2 10e9/L    Abs Immature Granulocytes 0.0 0 - 0.4 10e9/L    Absolute Nucleated RBC 0.0    Comprehensive metabolic panel     Status: Abnormal   Result Value Ref Range    Sodium 138 133 - 144 mmol/L    Potassium 4.0 3.4 - 5.3 mmol/L    Chloride 102 94 - 109 mmol/L    Carbon Dioxide 27 20 - 32 mmol/L    Anion Gap 9 3 - 14 mmol/L    Glucose 105 (H) 70 - 99 mg/dL    Urea Nitrogen 11 7 - 30 mg/dL    Creatinine 0.86 0.66 - 1.25 mg/dL    GFR Estimate >90 >60 mL/min/[1.73_m2]    GFR Estimate If Black >90 >60 mL/min/[1.73_m2]    Calcium 8.2 (L) 8.5 - 10.1 mg/dL    Bilirubin Total 0.7 0.2 - 1.3 mg/dL    Albumin 4.0 3.4 - 5.0 g/dL    Protein Total 7.4 6.8 - 8.8 g/dL    Alkaline Phosphatase 36 (L) 40 - 150 U/L    ALT 63  0 - 70 U/L    AST 34 0 - 45 U/L   INR     Status: None   Result Value Ref Range    INR 1.00 0.86 - 1.14   Blood gas venous     Status: Abnormal   Result Value Ref Range    Ph Venous 7.37 7.32 - 7.43 pH    PCO2 Venous 50 40 - 50 mm Hg    PO2 Venous 18 (L) 25 - 47 mm Hg    Bicarbonate Venous 29 (H) 21 - 28 mmol/L    Base Excess Venous 2.3 mmol/L    FIO2 21    Troponin I     Status: None   Result Value Ref Range    Troponin I ES <0.015 0.000 - 0.045 ug/L   Magnesium     Status: Abnormal   Result Value Ref Range    Magnesium 2.5 (H) 1.6 - 2.3 mg/dL     Results for orders placed or performed during the hospital encounter of 04/29/21   XR Chest Port 1 View     Status: None (Preliminary result)    Narrative    XR PORTABLE CHEST ONE VIEW  4/29/2021 6:05 PM     HISTORY: COVID positive, shortness of breath, evaluate for pneumonia.    COMPARISON: None.      Impression    IMPRESSION: Shallow inspiration. There appear to be peripheral  opacities, though this may be artifact due to decreased penetration.  Heart size is normal. No obvious pleural effusion or pneumothorax.  Would consider dedicated PA and lateral radiographs.   Chest XR,  PA & LAT     Status: None    Narrative    CHEST TWO VIEWS 4/29/2021 7:12 PM     HISTORY: SOB, COVID+, eval for pneumonia    COMPARISON: 4/29/2021 at 1750 hours      Impression    IMPRESSION: No pleural fluid or pneumothorax. Heterogeneous pulmonary  opacities are more conspicuous on this study than the prior study due  to differences in technique. These are concerning for  multifocal/atypical infection. Normal size of the heart.    LESTER J FAHRNER, MD   CBC with platelets differential     Status: Abnormal   Result Value Ref Range    WBC 5.0 4.0 - 11.0 10e9/L    RBC Count 4.94 4.4 - 5.9 10e12/L    Hemoglobin 15.3 13.3 - 17.7 g/dL    Hematocrit 44.7 40.0 - 53.0 %    MCV 91 78 - 100 fl    MCH 31.0 26.5 - 33.0 pg    MCHC 34.2 31.5 - 36.5 g/dL    RDW 13.1 10.0 - 15.0 %    Platelet Count 139 (L) 150 -  450 10e9/L    Diff Method Automated Method     % Neutrophils 68.5 %    % Lymphocytes 24.7 %    % Monocytes 5.6 %    % Eosinophils 0.2 %    % Basophils 0.2 %    % Immature Granulocytes 0.8 %    Nucleated RBCs 0 0 /100    Absolute Neutrophil 3.4 1.6 - 8.3 10e9/L    Absolute Lymphocytes 1.2 0.8 - 5.3 10e9/L    Absolute Monocytes 0.3 0.0 - 1.3 10e9/L    Absolute Eosinophils 0.0 0.0 - 0.7 10e9/L    Absolute Basophils 0.0 0.0 - 0.2 10e9/L    Abs Immature Granulocytes 0.0 0 - 0.4 10e9/L    Absolute Nucleated RBC 0.0    Comprehensive metabolic panel     Status: Abnormal   Result Value Ref Range    Sodium 138 133 - 144 mmol/L    Potassium 4.0 3.4 - 5.3 mmol/L    Chloride 102 94 - 109 mmol/L    Carbon Dioxide 27 20 - 32 mmol/L    Anion Gap 9 3 - 14 mmol/L    Glucose 105 (H) 70 - 99 mg/dL    Urea Nitrogen 11 7 - 30 mg/dL    Creatinine 0.86 0.66 - 1.25 mg/dL    GFR Estimate >90 >60 mL/min/[1.73_m2]    GFR Estimate If Black >90 >60 mL/min/[1.73_m2]    Calcium 8.2 (L) 8.5 - 10.1 mg/dL    Bilirubin Total 0.7 0.2 - 1.3 mg/dL    Albumin 4.0 3.4 - 5.0 g/dL    Protein Total 7.4 6.8 - 8.8 g/dL    Alkaline Phosphatase 36 (L) 40 - 150 U/L    ALT 63 0 - 70 U/L    AST 34 0 - 45 U/L   INR     Status: None   Result Value Ref Range    INR 1.00 0.86 - 1.14   Blood gas venous     Status: Abnormal   Result Value Ref Range    Ph Venous 7.37 7.32 - 7.43 pH    PCO2 Venous 50 40 - 50 mm Hg    PO2 Venous 18 (L) 25 - 47 mm Hg    Bicarbonate Venous 29 (H) 21 - 28 mmol/L    Base Excess Venous 2.3 mmol/L    FIO2 21    Troponin I     Status: None   Result Value Ref Range    Troponin I ES <0.015 0.000 - 0.045 ug/L   Magnesium     Status: Abnormal   Result Value Ref Range    Magnesium 2.5 (H) 1.6 - 2.3 mg/dL          Assessments & Plan (with Medical Decision Making)   Patient presents for the above complaints.  On my evaluation he is alert, cooperative, no acute distress.  Initial vital signs show a temperature of 99, room air oxygen saturation is 95% at  rest, though the triage nurse did notice that he dips down to 92 to 93% with exertion such as walking from triage to the exam room.    Symptoms are very likely Covid related given his recent positive test.  He does have some pre-existing conditions which would make him at higher risk for more severe Covid.  He is not currently requiring any extra oxygen but we will certainly continue to monitor him for this.  We did establish IV access and we did draw blood for laboratory analysis.  CBC is normal except for platelets of 139 (consistent with COVID), CMP essentially WNL, blood culture x2 was obtained.  VBG shows pH of 7.37 with pO2 of 18, pCO2 of 29, troponin negative, magnesium 2.5.  Portable chest x-ray was read by radiology as possible peripheral opacities but radiology advised dedicated PA and lateral CXR which was done - this shows multifocal process consistent with COVID.   His positive Covid swab is in the system it was checked on April 22 through a [facility] clinic but it is in Epic.  It is positive as of April 22.    He has not required supplemental O2 in the ED.  AFter fluids he is feeling much better.  WE did ambulate him here in the ED and he did not desaturate. Do not think he requires admission at this time. Instructed him on appropriate fever control with tylenol/motrin, encouraged fluids, encouraged ambulation. Gave home pulse oximeter and instructed on home use. REturn precautions discussed. Patient eager for discharge as he is feeling better.  Patient verbalizes understanding.     This part of the medical record was transcribed by Kailey Wesley, Medical Scribe, from a dictation done by Tanvi Raygoza MD.      I have reviewed the nursing notes. I have reviewed the findings, diagnosis, plan and need for follow up with the patient.    Discharge Medication List as of 4/29/2021  8:00 PM          Final diagnoses:   2019 novel coronavirus disease (COVID-19)       --  MD YAKOV Bay  [facility] EMERGENCY DEPARTMENT  4/29/2021     Tanvi Raygoza MD  04/29/21 2035

## 2021-04-29 NOTE — TELEPHONE ENCOUNTER
Called pt to triage symptoms from kaleo message on 4/29. Left nondetailed message requesting pt call back and ask to speak with a triage nurse.    Ketty Herrera RN  [facility]

## 2021-04-29 NOTE — ED NOTES
Took pt for a walk in his room, O2 sats were 96% to start, as her walked around the room, sats were 95-96%; moved bed over to make a larger [place] for the patient to walk and his O2 sats went up to 97-98%; did not feel short of breath, he stated he feels good.

## 2021-04-29 NOTE — TELEPHONE ENCOUNTER
Called pt to triage - see triage encounter from 4/29.    Ketty Herrera RN  [facility]

## 2021-04-29 NOTE — TELEPHONE ENCOUNTER
"Spoke with pt regarding symptoms - developed COVID symptoms on 4/21, tested positive on 4/22. Pt's last temp was 102.5, has fatigue, weakness, chills, and SOB with minimal exertion. Reports that his symptoms have been worsening. Pt sounded short of breath on the phone with triager. Advised pt to go to ED immediately per [facility] [place], pt verbalized understanding - brother is home with him and will drive him to ED.    Ketty Herrera RN  [facility]      Reason for Disposition    MODERATE difficulty breathing (e.g., speaks in phrases, SOB even at rest, pulse 100-120)    Additional Information    Negative: SEVERE difficulty breathing (e.g., struggling for each breath, speaks in single words)    Negative: Difficult to awaken or acting confused (e.g., disoriented, slurred speech)    Negative: Bluish (or gray) lips or face now    Negative: Shock suspected (e.g., cold/pale/clammy skin, too weak to stand, low BP, rapid pulse)    Negative: Sounds like a life-threatening emergency to the triager    Negative: [1] COVID-19 exposure AND [2] no symptoms    Negative: [1] Lives with someone known to have influenza (flu test positive) AND [2] flu-like symptoms (e.g., cough, runny nose, sore throat, SOB; with or without fever)    Negative: [1] Adult with possible COVID-19 symptoms AND [2] triager concerned about severity of symptoms or other causes    Negative: COVID-19 vaccine reaction suspected (e.g., fever, headache, muscle aches) occurring during days 1-3 after getting vaccine    Negative: COVID-19 vaccine, questions about    Negative: COVID-19 and breastfeeding, questions about    Answer Assessment - Initial Assessment Questions  1. COVID-19 DIAGNOSIS: \"Who made your Coronavirus (COVID-19) diagnosis?\" \"Was it confirmed by a positive lab test?\" If not diagnosed by a HCP, ask \"Are there lots of cases (community spread) where you live?\" (See public health department website, if unsure)      Tested positive Thurs " "4/22  2. COVID-19 EXPOSURE: \"Was there any known exposure to COVID before the symptoms began?\" CDC Definition of close contact: within 6 feet (2 meters) for a total of 15 minutes or more over a 24-hour period.       No  3. ONSET: \"When did the COVID-19 symptoms start?\"       Wednesday 4/21  4. WORST SYMPTOM: \"What is your worst symptom?\" (e.g., cough, fever, shortness of breath, muscle aches)      Fever, shortness of breath  5. COUGH: \"Do you have a cough?\" If so, ask: \"How bad is the cough?\"        Yes, it comes and goes and is worst at night  6. FEVER: \"Do you have a fever?\" If so, ask: \"What is your temperature, how was it measured, and when did it start?\"      102.5 this morning  7. RESPIRATORY STATUS: \"Describe your breathing?\" (e.g., shortness of breath, wheezing, unable to speak)       Shortness of breath with minimal exertion  8. BETTER-SAME-WORSE: \"Are you getting better, staying the same or getting worse compared to yesterday?\"  If getting worse, ask, \"In what way?\"      Worse, fever is worse today, feels he is getting weaker  9. HIGH RISK DISEASE: \"Do you have any chronic medical problems?\" (e.g., asthma, heart or lung disease, weak immune system, obesity, etc.)      Obesity, hypertension    11. OTHER SYMPTOMS: \"Do you have any other symptoms?\"  (e.g., chills, fatigue, headache, loss of smell or taste, muscle pain, sore throat; new loss of smell or taste especially support the diagnosis of COVID-19)        Fatigue, chills    Protocols used: CORONAVIRUS (COVID-19) DIAGNOSED OR [id number] 1.3    "

## 2021-05-05 ENCOUNTER — MYC MEDICAL ADVICE (OUTPATIENT)
Dept: FAMILY MEDICINE | Facility: CLINIC | Age: 31
End: 2021-05-05

## 2021-05-05 DIAGNOSIS — F41.1 GAD (GENERALIZED ANXIETY DISORDER): ICD-10-CM

## 2021-05-05 LAB
BACTERIA SPEC CULT: NO GROWTH
BACTERIA SPEC CULT: NO GROWTH
SPECIMEN SOURCE: NORMAL
SPECIMEN SOURCE: NORMAL

## 2021-05-06 ENCOUNTER — NURSE TRIAGE (OUTPATIENT)
Dept: FAMILY MEDICINE | Facility: CLINIC | Age: 31
End: 2021-05-06

## 2021-05-06 DIAGNOSIS — F41.1 GAD (GENERALIZED ANXIETY DISORDER): ICD-10-CM

## 2021-05-06 RX ORDER — ALPRAZOLAM 0.25 MG
0.25 TABLET ORAL 3 TIMES DAILY PRN
Qty: 60 TABLET | Refills: 3 | Status: SHIPPED | OUTPATIENT
Start: 2021-05-06 | End: 2021-09-08

## 2021-05-06 RX ORDER — ALPRAZOLAM 0.25 MG
0.25 TABLET ORAL 3 TIMES DAILY PRN
Qty: 60 TABLET | Refills: 3 | Status: CANCELLED | OUTPATIENT
Start: 2021-05-06

## 2021-05-06 NOTE — TELEPHONE ENCOUNTER
Sent my chart message regarding providers message in previous encounter. Also left vm to return call to clinic    Gloria Arevalo RN   [facility]

## 2021-05-06 NOTE — TELEPHONE ENCOUNTER
Sent my chart message regarding providers message. Also left vm to return call to clinic    Gloria Arevalo RN   [facility]

## 2021-05-06 NOTE — TELEPHONE ENCOUNTER
The probable clot in the arm where the iv was placed can be treated with heat to the area and aspirin as an anti-inflammatory and blood thinner.      If he is concerned about a clot in the leg, he will need an ultrasound to rule that out.  If it is improving, likely not a clot.    Please call patient to advise.    Will also send refill of the alprazolam to the pharmacy.    Rayo Toledo MD

## 2021-05-06 NOTE — TELEPHONE ENCOUNTER
Requested Prescriptions   Pending Prescriptions Disp Refills     ALPRAZolam (XANAX) 0.25 MG tablet 60 tablet 3     Sig: Take 1 tablet (0.25 mg) by mouth 3 times daily as needed for anxiety       There is no refill protocol information for this order      Routing refill request to provider for review/approval because:  Drug not on the [facility] refill protocol     Gloria Arevalo RN   [facility]

## 2021-05-06 NOTE — TELEPHONE ENCOUNTER
Left vm for patient to return call to clinic regarding my chart message and symptoms he is having. Sent Rx for xanax to provider    Gloria Arevalo RN   [facility]

## 2021-09-07 DIAGNOSIS — F41.1 GAD (GENERALIZED ANXIETY DISORDER): ICD-10-CM

## 2021-09-08 DIAGNOSIS — F41.1 GAD (GENERALIZED ANXIETY DISORDER): ICD-10-CM

## 2021-09-08 RX ORDER — ALPRAZOLAM 0.25 MG
0.25 TABLET ORAL 3 TIMES DAILY PRN
Qty: 60 TABLET | Refills: 3 | Status: SHIPPED | OUTPATIENT
Start: 2021-09-08 | End: 2022-08-08

## 2021-09-08 RX ORDER — ALPRAZOLAM 0.25 MG
0.25 TABLET ORAL 3 TIMES DAILY PRN
Qty: 60 TABLET | Refills: 3 | Status: CANCELLED | OUTPATIENT
Start: 2021-09-08

## 2021-09-08 NOTE — TELEPHONE ENCOUNTER
Requested Prescriptions   Pending Prescriptions Disp Refills     ALPRAZolam (XANAX) 0.25 MG tablet 60 tablet 3     Sig: Take 1 tablet (0.25 mg) by mouth 3 times daily as needed for anxiety       There is no refill protocol information for this order        Routing refill request to provider for review/approval because:  Drug not on the [facility] refill protocol     ThanksVanesa RN  [facility]

## 2021-09-08 NOTE — TELEPHONE ENCOUNTER
Refill requests sent via [employer].  Cued and sent to pool.     Thanks,  MICHELLE Alexis  [facility]

## 2021-10-02 ENCOUNTER — HEALTH MAINTENANCE LETTER (OUTPATIENT)
Age: 31
End: 2021-10-02

## 2022-01-12 NOTE — TELEPHONE ENCOUNTER
"Dr. Toledo,     Please advise: agree with advice, any additional details or eval needed?       1.) Pt was seen in ED on 4/29/21 IV placed in left arm.   Several days later (Sunday 5/2) arm was sore and he could feel a hard ropy in the antecubital area. Is approximately pencil sized or smaller and 3-4\" long.   Area is tender with touching. Straightening the elbow makes aching worse.     Denies fever, bruising, redness, rash, warmth, change in skin appearance or numbness/tingling/weakeness in arm/hand.      NO heat or ice used.     Recommended that Pt apply heat to site 3 times a day.   If he is having worsening symptoms or s/s of infection that develop then needs evaluation.       2.) Several days after ED discharge started to have pain in the left calf. Describes pain/discomfort like a leida horse. Pain in the beginning was moderate now mild. Getting better. Tender with palpation, but not light touch.     Denies SOB, chest pain, swelling, warmth, redness, thigh pain, change in skin appearance, long distance travel, recent surgery, blood clot Hx.     Does have familiar history of blood clots on maternal side.   Has been ill with covid symptoms, but up and moving around more recently.       Also needs refills of Xanax.   Pharmacy filled the following dates: 12/30/20 1/18/21 2/18, 3/17/21      Albino Love RN   [facility]       " Statement Selected

## 2022-01-16 ENCOUNTER — HEALTH MAINTENANCE LETTER (OUTPATIENT)
Age: 32
End: 2022-01-16

## 2022-02-04 DIAGNOSIS — I10 HYPERTENSION GOAL BP (BLOOD PRESSURE) < 140/90: ICD-10-CM

## 2022-02-04 RX ORDER — LISINOPRIL 20 MG/1
TABLET ORAL
Qty: 90 TABLET | Refills: 3 | Status: SHIPPED | OUTPATIENT
Start: 2022-02-04 | End: 2022-10-03

## 2022-02-04 NOTE — TELEPHONE ENCOUNTER
"Dr. Toledo,    Requested Prescriptions   Pending Prescriptions Disp Refills     lisinopril (ZESTRIL) 20 MG tablet [Pharmacy Med Name: LISINOPRIL 20 MG TABLET] 90 tablet 3     Sig: TAKE 1 TABLET BY MOUTH EVERY DAY       ACE Inhibitors (Including Combos) Protocol Failed - 2/4/2022 12:22 PM        Failed - Recent (12 mo) or future (30 days) visit within the authorizing provider's specialty     Patient has had an office visit with the authorizing provider or a provider within the authorizing providers department within the previous 12 mos or has a future within next 30 days. See \"Patient Info\" tab in inbasket, or \"Choose Columns\" in Meds & Orders section of the refill encounter.              Passed - Blood pressure under 140/90 in past 12 months     BP Readings from Last 3 Encounters:   04/29/21 137/75   12/14/20 (!) 144/80   03/10/20 136/86                 Passed - Medication is active on med list        Passed - Patient is age 18 or older        Passed - Normal serum creatinine on file in past 12 months     Recent Labs   Lab Test 04/29/21  1650   CR 0.86       Ok to refill medication if creatinine is low          Passed - Normal serum potassium on file in past 12 months     Recent Labs   Lab Test 04/29/21  1650   POTASSIUM 4.0                Routing refill request to provider for review/approval because:  Patient needs to be seen because it has been more than 1 year since last office visit.    Ketty Herrera RN  [facility]          "

## 2022-10-03 ENCOUNTER — OFFICE VISIT (OUTPATIENT)
Dept: FAMILY MEDICINE | Facility: CLINIC | Age: 32
End: 2022-10-03
Payer: COMMERCIAL

## 2022-10-03 ENCOUNTER — LAB (OUTPATIENT)
Dept: LAB | Facility: CLINIC | Age: 32
End: 2022-10-03

## 2022-10-03 VITALS
OXYGEN SATURATION: 96 % | WEIGHT: 285.5 LBS | SYSTOLIC BLOOD PRESSURE: 152 MMHG | HEIGHT: 69 IN | HEART RATE: 82 BPM | DIASTOLIC BLOOD PRESSURE: 86 MMHG | TEMPERATURE: 96.8 F | BODY MASS INDEX: 42.29 KG/M2

## 2022-10-03 DIAGNOSIS — M25.562 ARTHRALGIA OF BOTH KNEES: ICD-10-CM

## 2022-10-03 DIAGNOSIS — Z23 NEEDS FLU SHOT: ICD-10-CM

## 2022-10-03 DIAGNOSIS — M25.561 ARTHRALGIA OF BOTH KNEES: ICD-10-CM

## 2022-10-03 DIAGNOSIS — Z23 NEED FOR TDAP VACCINATION: ICD-10-CM

## 2022-10-03 DIAGNOSIS — Z00.00 ROUTINE GENERAL MEDICAL EXAMINATION AT A HEALTH CARE FACILITY: Primary | ICD-10-CM

## 2022-10-03 DIAGNOSIS — Z13.220 SCREENING FOR LIPID DISORDERS: ICD-10-CM

## 2022-10-03 DIAGNOSIS — I10 HYPERTENSION GOAL BP (BLOOD PRESSURE) < 140/90: ICD-10-CM

## 2022-10-03 DIAGNOSIS — L72.11 PILAR CYST OF SCALP: ICD-10-CM

## 2022-10-03 DIAGNOSIS — F41.1 GAD (GENERALIZED ANXIETY DISORDER): ICD-10-CM

## 2022-10-03 LAB
ANION GAP SERPL CALCULATED.3IONS-SCNC: 10 MMOL/L (ref 3–14)
BUN SERPL-MCNC: 13 MG/DL (ref 7–30)
CALCIUM SERPL-MCNC: 9.2 MG/DL (ref 8.5–10.1)
CHLORIDE BLD-SCNC: 100 MMOL/L (ref 94–109)
CHOLEST SERPL-MCNC: 272 MG/DL
CO2 SERPL-SCNC: 23 MMOL/L (ref 20–32)
CREAT SERPL-MCNC: 0.84 MG/DL (ref 0.66–1.25)
CREAT UR-MCNC: 207 MG/DL
FASTING STATUS PATIENT QL REPORTED: YES
GFR SERPL CREATININE-BSD FRML MDRD: >90 ML/MIN/1.73M2
GLUCOSE BLD-MCNC: 124 MG/DL (ref 70–99)
HDLC SERPL-MCNC: 33 MG/DL
LDLC SERPL CALC-MCNC: 198 MG/DL
MICROALBUMIN UR-MCNC: 219 MG/L
MICROALBUMIN/CREAT UR: 105.8 MG/G CR (ref 0–17)
NONHDLC SERPL-MCNC: 239 MG/DL
POTASSIUM BLD-SCNC: 4.5 MMOL/L (ref 3.4–5.3)
SODIUM SERPL-SCNC: 133 MMOL/L (ref 133–144)
TRIGL SERPL-MCNC: 205 MG/DL

## 2022-10-03 PROCEDURE — 90472 IMMUNIZATION ADMIN EACH ADD: CPT | Performed by: FAMILY MEDICINE

## 2022-10-03 PROCEDURE — 96127 BRIEF EMOTIONAL/BEHAV ASSMT: CPT | Performed by: FAMILY MEDICINE

## 2022-10-03 PROCEDURE — 90686 IIV4 VACC NO PRSV 0.5 ML IM: CPT | Performed by: FAMILY MEDICINE

## 2022-10-03 PROCEDURE — 99395 PREV VISIT EST AGE 18-39: CPT | Mod: 25 | Performed by: FAMILY MEDICINE

## 2022-10-03 PROCEDURE — 36415 COLL VENOUS BLD VENIPUNCTURE: CPT

## 2022-10-03 PROCEDURE — 99213 OFFICE O/P EST LOW 20 MIN: CPT | Mod: 25 | Performed by: FAMILY MEDICINE

## 2022-10-03 PROCEDURE — 82043 UR ALBUMIN QUANTITATIVE: CPT

## 2022-10-03 PROCEDURE — 80061 LIPID PANEL: CPT

## 2022-10-03 PROCEDURE — 90471 IMMUNIZATION ADMIN: CPT | Performed by: FAMILY MEDICINE

## 2022-10-03 PROCEDURE — 90715 TDAP VACCINE 7 YRS/> IM: CPT | Performed by: FAMILY MEDICINE

## 2022-10-03 PROCEDURE — 80048 BASIC METABOLIC PNL TOTAL CA: CPT

## 2022-10-03 RX ORDER — LISINOPRIL 30 MG/1
30 TABLET ORAL DAILY
Qty: 90 TABLET | Refills: 3 | Status: SHIPPED | OUTPATIENT
Start: 2022-10-03 | End: 2023-09-11

## 2022-10-03 ASSESSMENT — ANXIETY QUESTIONNAIRES
GAD7 TOTAL SCORE: 2
3. WORRYING TOO MUCH ABOUT DIFFERENT THINGS: SEVERAL DAYS
7. FEELING AFRAID AS IF SOMETHING AWFUL MIGHT HAPPEN: NOT AT ALL
2. NOT BEING ABLE TO STOP OR CONTROL WORRYING: NOT AT ALL
IF YOU CHECKED OFF ANY PROBLEMS ON THIS QUESTIONNAIRE, HOW DIFFICULT HAVE THESE PROBLEMS MADE IT FOR YOU TO DO YOUR WORK, TAKE CARE OF THINGS AT HOME, OR GET ALONG WITH OTHER PEOPLE: SOMEWHAT DIFFICULT
1. FEELING NERVOUS, ANXIOUS, OR ON EDGE: SEVERAL DAYS
GAD7 TOTAL SCORE: 2
5. BEING SO RESTLESS THAT IT IS HARD TO SIT STILL: NOT AT ALL
6. BECOMING EASILY ANNOYED OR IRRITABLE: NOT AT ALL

## 2022-10-03 ASSESSMENT — ENCOUNTER SYMPTOMS
CONSTIPATION: 0
DIARRHEA: 0
SHORTNESS OF BREATH: 0
DIZZINESS: 0
ARTHRALGIAS: 1
ABDOMINAL PAIN: 0
SORE THROAT: 0
EYE PAIN: 0
PALPITATIONS: 0
HEADACHES: 0
HEMATURIA: 0
JOINT SWELLING: 1
HEMATOCHEZIA: 0
DYSURIA: 0
NAUSEA: 0
NERVOUS/ANXIOUS: 0
CHILLS: 0
HEARTBURN: 0
PARESTHESIAS: 0
FREQUENCY: 0
FEVER: 0
WEAKNESS: 0
COUGH: 0
MYALGIAS: 0

## 2022-10-03 ASSESSMENT — PATIENT HEALTH QUESTIONNAIRE - PHQ9
5. POOR APPETITE OR OVEREATING: NOT AT ALL
SUM OF ALL RESPONSES TO PHQ QUESTIONS 1-9: 0
10. IF YOU CHECKED OFF ANY PROBLEMS, HOW DIFFICULT HAVE THESE PROBLEMS MADE IT FOR YOU TO DO YOUR WORK, TAKE CARE OF THINGS AT HOME, OR GET ALONG WITH OTHER PEOPLE: NOT DIFFICULT AT ALL
SUM OF ALL RESPONSES TO PHQ QUESTIONS 1-9: 0

## 2022-10-03 ASSESSMENT — PAIN SCALES - GENERAL: PAINLEVEL: MILD PAIN (2)

## 2022-10-03 NOTE — PROGRESS NOTES
SUBJECTIVE:   CC: Tawanda is an 31 year old who presents for preventative health visit.     Patient has been advised of split billing requirements and indicates understanding: Yes     Healthy Habits:     Getting at least 3 servings of Calcium per day:  NO    Bi-annual eye exam:  NO    Dental care twice a year:  NO    Sleep apnea or symptoms of sleep apnea:  Sleep apnea    Diet:  Regular (no restrictions)    Frequency of exercise:  2-3 days/week    Duration of exercise:  15-30 minutes    Taking medications regularly:  Yes    Medication side effects:  None    PHQ-2 Total Score: 0    Additional concerns today:  Yes  Answers for HPI/ROS submitted by the patient on 10/3/2022  If you checked off any problems, how difficult have these problems made it for you to do your work, take care of things at home, or get along with other people?: Not difficult at all  PHQ9 TOTAL SCORE: 0          Patient is here today for routine physical.  He feels good at this time.  He has not been checking his blood pressures at home.  Usually takes his lisinopril in the evening.  He will use the alprazolam as needed for anxiety about twice daily 4 to 5 days out of the week.    Recently had some joint pain that he thought might be related to gout.  It was rather migratory and started about 7 to 10 days ago in the wrists and then migrated to the knees and ankles but has been getting progressively better and ibuprofen is helpful.  Because of this and the fact that he thought it might be gout he stopped drinking alcohol and this is also helped with the pain as well as improve the quality of his sleep.  He does wake up at night gasping periodically and experience some daytime somnolence.  He usually sleeps on his back.  No waking headaches.  Since quitting alcohol he feels these things as well as his feeling of being rested in the morning are improving.    Finally he has some bumps on his head.  These been present for a long time but 1 in the frontal  area seems to be slowly getting bigger and there are several smaller ones.  Siblings have had similar issues.      Today's PHQ-2 Score:   PHQ-2 ( 1999 Pfizer) 10/3/2022   Q1: Little interest or pleasure in doing things 0   Q2: Feeling down, depressed or hopeless 0   PHQ-2 Score 0   Q1: Little interest or pleasure in doing things Not at all   Q2: Feeling down, depressed or hopeless Not at all   PHQ-2 Score 0       Abuse: Current or Past(Physical, Sexual or Emotional)- No  Do you feel safe in your environment? Yes    Have you ever done Advance Care Planning? (For example, a Health Directive, POLST, or a discussion with a medical provider or your loved ones about your wishes): No, advance care planning information given to patient to review.  Patient plans to discuss their wishes with loved ones or provider.      Social History     Tobacco Use     Smoking status: Never Smoker     Smokeless tobacco: Never Used   Substance Use Topics     Alcohol use: Not Currently     Comment: 10-15 until recently, quit due to possible gout     If you drink alcohol do you typically have >3 drinks per day or >7 drinks per week? Yes      Alcohol Use 10/3/2022   Prescreen: >3 drinks/day or >7 drinks/week? Yes   Prescreen: >3 drinks/day or >7 drinks/week? -   AUDIT SCORE  6     AUDIT - Alcohol Use Disorders Identification Test - Reproduced from the World Health Organization Audit 2001 (Second Edition) 10/3/2022   1.  How often do you have a drink containing alcohol? 2 to 3 times a week   2.  How many drinks containing alcohol do you have on a typical day when you are drinking? 3 or 4   3.  How often do you have five or more drinks on one occasion? Monthly   4.  How often during the last year have you found that you were not able to stop drinking once you had started? Never   5.  How often during the last year have you failed to do what was normally expected of you because of drinking? Never   6.  How often during the last year have you  needed a first drink in the morning to get yourself going after a heavy drinking session? Never   7.  How often during the last year have you had a feeling of guilt or remorse after drinking? Never   8.  How often during the last year have you been unable to remember what happened the night before because of your drinking? Never   9.  Have you or someone else been injured because of your drinking? No   10. Has a relative, friend, doctor or other health care worker been concerned about your drinking or suggested you cut down? No   TOTAL SCORE 6       Last PSA: No results found for: PSA    Reviewed orders with patient. Reviewed health maintenance and updated orders accordingly - Yes  Patient Active Problem List   Diagnosis     CARDIOVASCULAR SCREENING; LDL GOAL LESS THAN 160     Major depression in complete remission (H)     Hypertension goal BP (blood pressure) < 140/90     Obesity (BMI 35.0-39.9) with comorbidity (H)     SHIVANI (generalized anxiety disorder)     Past Surgical History:   Procedure Laterality Date     ENT SURGERY  2008    wisdom teeth extractions       Social History     Tobacco Use     Smoking status: Never Smoker     Smokeless tobacco: Never Used   Substance Use Topics     Alcohol use: Not Currently     Comment: 10-15 until recently, quit due to possible gout     Family History   Problem Relation Age of Onset     Asthma Mother      Hypertension Father      Anxiety Disorder Father      Hypertension Brother      Anxiety Disorder Brother      Anxiety Disorder Brother      Cerebrovascular Disease Maternal Grandmother      Arthritis Maternal Grandmother      Prostate Cancer Maternal Grandfather      Arthritis Maternal Grandfather      Cancer Maternal Grandfather      Diabetes Paternal Grandmother      Cerebrovascular Disease Paternal Grandfather      Cancer Paternal Grandfather          Current Outpatient Medications   Medication Sig Dispense Refill     lisinopril (ZESTRIL) 30 MG tablet Take 1 tablet (30  "mg) by mouth daily 90 tablet 3     ALPRAZolam (XANAX) 0.25 MG tablet Take 1 tablet (0.25 mg) by mouth 3 times daily as needed for anxiety 60 tablet 1     Allergies   Allergen Reactions     Codeine        Reviewed and updated as needed this visit by clinical staff   Tobacco  Allergies  Meds  Problems  Med Hx  Surg Hx  Fam Hx  Soc   Hx          Reviewed and updated as needed this visit by Provider   Tobacco  Allergies  Meds  Problems  Med Hx  Surg Hx  Fam Hx  Soc   Hx             Review of Systems   Constitutional: Negative for chills and fever.   HENT: Negative for congestion, ear pain, hearing loss and sore throat.    Eyes: Negative for pain and visual disturbance.   Respiratory: Negative for cough and shortness of breath.    Cardiovascular: Negative for chest pain, palpitations and peripheral edema.   Gastrointestinal: Negative for abdominal pain, constipation, diarrhea, heartburn, hematochezia and nausea.   Genitourinary: Negative for dysuria, frequency, genital sores, hematuria, impotence, penile discharge and urgency.   Musculoskeletal: Positive for arthralgias and joint swelling. Negative for myalgias.   Skin: Negative for rash.   Neurological: Negative for dizziness, weakness, headaches and paresthesias.   Psychiatric/Behavioral: Negative for mood changes. The patient is not nervous/anxious.          OBJECTIVE:   BP (!) 152/86 (BP Location: Right arm, Patient Position: Sitting, Cuff Size: Adult Large)   Pulse 82   Temp 96.8  F (36  C) (Temporal)   Ht 1.765 m (5' 9.49\")   Wt 129.5 kg (285 lb 8 oz)   SpO2 96%   BMI 41.57 kg/m      Physical Exam  GENERAL: healthy, alert and no distress  EYES: Eyes grossly normal to inspection, PERRL and conjunctivae and sclerae normal  HENT: ear canals and TM's normal, nose and mouth without ulcers or lesions  NECK: no adenopathy, no asymmetry, masses, or scars and thyroid normal to palpation  RESP: lungs clear to auscultation - no rales, rhonchi or " wheezes  CV: regular rate and rhythm, normal S1 S2, no S3 or S4, no murmur, click or rub, no peripheral edema and peripheral pulses strong  ABDOMEN: soft, nontender, no hepatosplenomegaly, no masses and bowel sounds normal   (male): normal male genitalia without lesions or urethral discharge, no hernia  MS: no gross musculoskeletal defects noted, no edema  MS: There is no evidence of gout or joint effusion with palpation of the knees and no tenderness.  Range of motion at all joints was grossly normal.  SKIN: no suspicious lesions or rashes  SKIN: no suspicious lesions or rashes and frontal hairline reveals 1-1/2 cm subcutaneous mobile nodular mass consistent with a pilar cyst.  NEURO: Normal strength and tone, mentation intact and speech normal  PSYCH: mentation appears normal, affect normal/bright    Diagnostic Test Results:  Labs reviewed in Epic    ASSESSMENT/PLAN:   (Z00.00) Routine general medical examination at a health care facility  (primary encounter diagnosis)  Comment: Routine health issues appropriate for age were reviewed with the patient.  Follow-up would be recommended in 1 year.  We talked about the possible sleep issues and referral to sleep medicine.  He is going to work on weight loss and try to remain alcohol free as well as sleep a little bit more on his side and see if that improves symptoms but if not can contact me and I will put in a referral.  Plan: REVIEW OF HEALTH MAINTENANCE PROTOCOL ORDERS             (Z13.220) Screening for lipid disorders  Comment: Lipid panel was done today.  Plan: Lipid panel reflex to direct LDL Fasting             (F41.1) SHIVANI (generalized anxiety disorder)  Comment:  was reviewed and appropriate.  Refill was not needed on the alprazolam at this time but can take care of it when it comes up due, I anticipate later this month or early next month.  SHIVANI-7 and PHQ-9 scoring would suggest good control of symptoms at this time.  Plan:      (I10) Hypertension goal  "BP (blood pressure) < 140/90  Comment: Blood pressure control was less than optimal today.  Options reviewed with the patient and he is comfortable increasing the lisinopril as he has not experienced any side effects related to it.  I would suggest to recheck blood pressure in about 3 months and checking it more often outside the office.  He can notify me sooner if control is not improving or having issues with side effects.  Plan: BASIC METABOLIC PANEL, Albumin Random Urine         Quantitative with Creat Ratio, lisinopril         (ZESTRIL) 30 MG tablet             (Z23) Needs flu shot  Comment: Flu shot was provided today.  COVID-vaccine was discussed and declined.  Plan: INFLUENZA VACCINE IM > 6 MONTHS VALENT IIV4         (AFLURIA/FLUZONE)             (Z23) Need for Tdap vaccination  Comment: Tdap vaccine was provided today.  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  When he was reassured regarding this he did not feel it needed intervention at this time but can let me know if he would like referral to dermatology for excision.  Plan:      (M25.561,  M25.562) Arthralgia of both knees  Comment: Unclear etiology.  Today there is no evidence of an inflammatory issue and since symptoms are improving I have just recommended observation.  Since he feels that abstaining from alcohol has helped I think that is a good thing to do at this time.  Plan:      Patient has been advised of split billing requirements and indicates understanding: Yes    COUNSELING:   Reviewed preventive health counseling, as reflected in patient instructions       Regular exercise       Healthy diet/nutrition       Vision screening       Hearing screening       Immunizations    Vaccinated for: Influenza and TDAP          Estimated body mass index is 41.57 kg/m  as calculated from the following:    Height as of this encounter: 1.765 m (5' 9.49\").    Weight as of this " encounter: 129.5 kg (285 lb 8 oz).     Weight management plan: Discussed healthy diet and exercise guidelines    He reports that he has never smoked. He has never used smokeless tobacco.      Counseling Resources:  ATP IV Guidelines  Pooled Cohorts Equation Calculator  FRAX Risk Assessment  ICSI Preventive Guidelines  Dietary Guidelines for Americans, 2010  USDA's MyPlate  ASA Prophylaxis  Lung CA Screening    Rayo Toledo MD  [facility]

## 2022-10-10 DIAGNOSIS — F41.1 GAD (GENERALIZED ANXIETY DISORDER): ICD-10-CM

## 2022-10-10 RX ORDER — ALPRAZOLAM 0.25 MG
0.25 TABLET ORAL 3 TIMES DAILY PRN
Qty: 60 TABLET | Refills: 1 | Status: SHIPPED | OUTPATIENT
Start: 2022-10-10 | End: 2022-12-20

## 2022-10-10 NOTE — TELEPHONE ENCOUNTER
Reason for Call:  Medication or medication refill:    Do you use a [facility]?  Name of the pharmacy and phone number for the current request:  [facility] [employer] on [address]    Name of the medication requested: Alprazolam    Other request: N/A    Can we leave a detailed message on this number? YES    Phone number patient can be reached at: Home number on file [phone] (home)     Best Time: Any    Call taken on 10/10/2022 at 3:16 PM by Pallavi Shen

## 2022-12-19 ENCOUNTER — TELEPHONE (OUTPATIENT)
Dept: FAMILY MEDICINE | Facility: CLINIC | Age: 32
End: 2022-12-19

## 2022-12-19 DIAGNOSIS — F41.1 GAD (GENERALIZED ANXIETY DISORDER): ICD-10-CM

## 2022-12-19 NOTE — TELEPHONE ENCOUNTER
Reason for Call:  Medication or medication refill:    Do you use a [facility]?  Name of the pharmacy and phone number for the current request:  [facility] [address] [id number]     Name of the medication requested: Alprazolam    Other request: Patient is leaving for out of town for the holidays and is requesting a refill     Can we leave a detailed message on this number? YES    Phone number patient can be reached at: Cell number on file:    Telephone Information:   Mobile [phone]       Best Time: Anytime    Call taken on 12/19/2022 at 5:56 PM by Soha Omalley

## 2022-12-20 RX ORDER — ALPRAZOLAM 0.25 MG
0.25 TABLET ORAL 3 TIMES DAILY PRN
Qty: 60 TABLET | Refills: 1 | Status: SHIPPED | OUTPATIENT
Start: 2022-12-20 | End: 2023-03-17

## 2022-12-20 NOTE — TELEPHONE ENCOUNTER
Relayed message to patient about med refill.    Tawanda Parham RN   [facility]

## 2023-01-09 ENCOUNTER — OFFICE VISIT (OUTPATIENT)
Dept: FAMILY MEDICINE | Facility: CLINIC | Age: 33
End: 2023-01-09
Payer: COMMERCIAL

## 2023-01-09 ENCOUNTER — APPOINTMENT (OUTPATIENT)
Dept: LAB | Facility: CLINIC | Age: 33
End: 2023-01-09
Payer: COMMERCIAL

## 2023-01-09 VITALS
HEART RATE: 96 BPM | RESPIRATION RATE: 16 BRPM | HEIGHT: 70 IN | DIASTOLIC BLOOD PRESSURE: 100 MMHG | BODY MASS INDEX: 41.73 KG/M2 | WEIGHT: 291.5 LBS | SYSTOLIC BLOOD PRESSURE: 182 MMHG | OXYGEN SATURATION: 97 % | TEMPERATURE: 98.8 F

## 2023-01-09 DIAGNOSIS — I10 HYPERTENSION GOAL BP (BLOOD PRESSURE) < 140/90: Primary | ICD-10-CM

## 2023-01-09 DIAGNOSIS — E66.01 MORBID OBESITY (H): ICD-10-CM

## 2023-01-09 DIAGNOSIS — R73.9 HYPERGLYCEMIA: ICD-10-CM

## 2023-01-09 DIAGNOSIS — F33.42 RECURRENT MAJOR DEPRESSIVE DISORDER, IN FULL REMISSION (H): ICD-10-CM

## 2023-01-09 LAB
ANION GAP SERPL CALCULATED.3IONS-SCNC: 13 MMOL/L (ref 7–15)
BUN SERPL-MCNC: 7.1 MG/DL (ref 6–20)
CALCIUM SERPL-MCNC: 8.8 MG/DL (ref 8.6–10)
CHLORIDE SERPL-SCNC: 102 MMOL/L (ref 98–107)
CREAT SERPL-MCNC: 0.87 MG/DL (ref 0.67–1.17)
CREAT UR-MCNC: 191 MG/DL
DEPRECATED HCO3 PLAS-SCNC: 23 MMOL/L (ref 22–29)
ERYTHROCYTE [DISTWIDTH] IN BLOOD BY AUTOMATED COUNT: 15.3 % (ref 10–15)
GFR SERPL CREATININE-BSD FRML MDRD: >90 ML/MIN/1.73M2
GLUCOSE SERPL-MCNC: 121 MG/DL (ref 70–99)
HBA1C MFR BLD: 5.4 % (ref 0–5.6)
HCT VFR BLD AUTO: 45.1 % (ref 40–53)
HGB BLD-MCNC: 15.5 G/DL (ref 13.3–17.7)
MCH RBC QN AUTO: 32.3 PG (ref 26.5–33)
MCHC RBC AUTO-ENTMCNC: 34.4 G/DL (ref 31.5–36.5)
MCV RBC AUTO: 94 FL (ref 78–100)
MICROALBUMIN UR-MCNC: 196 MG/L
MICROALBUMIN/CREAT UR: 102.62 MG/G CR (ref 0–17)
PLATELET # BLD AUTO: 163 10E3/UL (ref 150–450)
POTASSIUM SERPL-SCNC: 4.4 MMOL/L (ref 3.4–5.3)
RBC # BLD AUTO: 4.8 10E6/UL (ref 4.4–5.9)
SODIUM SERPL-SCNC: 138 MMOL/L (ref 136–145)
WBC # BLD AUTO: 4.8 10E3/UL (ref 4–11)

## 2023-01-09 PROCEDURE — 99213 OFFICE O/P EST LOW 20 MIN: CPT | Performed by: FAMILY MEDICINE

## 2023-01-09 PROCEDURE — 82043 UR ALBUMIN QUANTITATIVE: CPT | Performed by: FAMILY MEDICINE

## 2023-01-09 PROCEDURE — 82570 ASSAY OF URINE CREATININE: CPT | Performed by: FAMILY MEDICINE

## 2023-01-09 PROCEDURE — 83036 HEMOGLOBIN GLYCOSYLATED A1C: CPT | Performed by: FAMILY MEDICINE

## 2023-01-09 PROCEDURE — 85027 COMPLETE CBC AUTOMATED: CPT | Performed by: FAMILY MEDICINE

## 2023-01-09 PROCEDURE — 36415 COLL VENOUS BLD VENIPUNCTURE: CPT | Performed by: FAMILY MEDICINE

## 2023-01-09 PROCEDURE — 80048 BASIC METABOLIC PNL TOTAL CA: CPT | Performed by: FAMILY MEDICINE

## 2023-01-09 RX ORDER — AMLODIPINE BESYLATE 5 MG/1
5 TABLET ORAL DAILY
Qty: 90 TABLET | Refills: 1 | Status: SHIPPED | OUTPATIENT
Start: 2023-01-09 | End: 2023-05-09

## 2023-01-09 ASSESSMENT — PAIN SCALES - GENERAL: PAINLEVEL: NO PAIN (0)

## 2023-01-09 NOTE — PROGRESS NOTES
Assessment & Plan     Hypertension goal BP (blood pressure) < 140/90  Blood pressure not at goal today.  Options are reviewed with the patient of increasing lisinopril or adding a second agent.  He just purchased a blood pressure cuff at home so I would strongly suggest monitoring his values at home.  Follow-up laboratory studies as noted below considering that he had some microalbuminuria when checked last fall.  Added amlodipine 5 mg daily and suggested follow-up in the clinic in 3 months but he can message me sooner with updated blood pressure values.  Side effects reviewed.  - Basic metabolic panel  (Ca, Cl, CO2, Creat, Gluc, K, Na, BUN); Future  - Albumin Random Urine Quantitative with Creat Ratio; Future  - CBC with platelets; Future  - amLODIPine (NORVASC) 5 MG tablet; Take 1 tablet (5 mg) by mouth daily    Recurrent major depressive disorder, in full remission (H)  Patient reports depression is currently in remission.    Morbid obesity (H)  Patient expresses a desire to work on weight loss at this time.    Hyperglycemia  Was noted to have some hyperglycemia in October and we will do follow-up testing at this time with an A1c.  - Hemoglobin A1c; Future                 Return in about 3 months (around 4/9/2023) for BP Recheck.    Rayo Toledo MD  [facility]    David Neff is a 32 year old presenting for the following health issues:  Hypertension      History of Present Illness       Hypertension: He presents for follow up of hypertension.  He does not check blood pressure  regularly outside of the clinic. Outside blood pressures have been over 140/90. He does not follow a low salt diet.           Patient is here today for hypertension follow-up.  He has not had any side effects from the increased dose of the lisinopril.  He is not checking his blood pressures at home.  He did have some joint pain from September through November of 2022 but that has now resolved.  It  "was symmetric and involve multiple large and small joints.  He was taking a lot of ibuprofen at that time.    Review of Systems   Constitutional, HEENT, cardiovascular, pulmonary, gi and gu systems are negative, except as otherwise noted.      Objective    BP (!) 178/92 (BP Location: Right arm, Patient Position: Sitting, Cuff Size: Adult Large)   Pulse 96   Temp 98.8  F (37.1  C) (Temporal)   Resp 16   Ht 1.77 m (5' 9.69\")   Wt 132.2 kg (291 lb 8 oz)   SpO2 97%   BMI 42.20 kg/m    Body mass index is 42.2 kg/m .  Physical Exam   GENERAL: healthy, alert and no distress  EYES: Eyes grossly normal to inspection, PERRL and conjunctivae and sclerae normal  HENT: normal cephalic/atraumatic, oropharynx clear and oral mucous membranes moist  NECK: no adenopathy, no asymmetry, masses, or scars and thyroid normal to palpation  RESP: lungs clear to auscultation - no rales, rhonchi or wheezes  CV: regular rate and rhythm, normal S1 S2, no S3 or S4, no murmur, click or rub, no peripheral edema and peripheral pulses strong  MS: no gross musculoskeletal defects noted, no edema  NEURO: Normal strength and tone, mentation intact and speech normal  PSYCH: mentation appears normal, affect normal/bright    Lab on 10/03/2022   Component Date Value Ref Range Status     Sodium 10/03/2022 133  133 - 144 mmol/L Final     Potassium 10/03/2022 4.5  3.4 - 5.3 mmol/L Final     Chloride 10/03/2022 100  94 - 109 mmol/L Final     Carbon Dioxide (CO2) 10/03/2022 23  20 - 32 mmol/L Final     Anion Gap 10/03/2022 10  3 - 14 mmol/L Final     Urea Nitrogen 10/03/2022 13  7 - 30 mg/dL Final     Creatinine 10/03/2022 0.84  0.66 - 1.25 mg/dL Final     Calcium 10/03/2022 9.2  8.5 - 10.1 mg/dL Final     Glucose 10/03/2022 124 (H)  70 - 99 mg/dL Final     GFR Estimate 10/03/2022 >90  >60 mL/min/1.73m2 Final    Effective December 21, 2021 eGFRcr in adults is calculated using the 2021 CKD-EPI creatinine equation which includes age and gender (Taylor et " al., MARIUM, DOI: 10.1056/EDUCow7329595)     Cholesterol 10/03/2022 272 (H)  <200 mg/dL Final     Triglycerides 10/03/2022 205 (H)  <150 mg/dL Final     Direct Measure HDL 10/03/2022 33 (L)  >=40 mg/dL Final     LDL Cholesterol Calculated 10/03/2022 198 (H)  <=100 mg/dL Final     Non HDL Cholesterol 10/03/2022 239 (H)  <130 mg/dL Final     Patient Fasting > 8hrs? 10/03/2022 Yes   Final     Creatinine Urine mg/dL 10/03/2022 207  mg/dL Final     Albumin Urine mg/L 10/03/2022 219  mg/L Final     Albumin Urine mg/g Cr 10/03/2022 105.80 (H)  0.00 - 17.00 mg/g Cr Final

## 2023-03-16 ENCOUNTER — TELEPHONE (OUTPATIENT)
Dept: FAMILY MEDICINE | Facility: CLINIC | Age: 33
End: 2023-03-16
Payer: COMMERCIAL

## 2023-03-16 DIAGNOSIS — F41.1 GAD (GENERALIZED ANXIETY DISORDER): ICD-10-CM

## 2023-03-16 NOTE — TELEPHONE ENCOUNTER
Medication Question or Refill    Contacts       Type Contact Phone/Fax    03/16/2023 05:47 PM CDT Phone (Incoming) JolieTawanda ann (Self) [phone] (H)          What medication are you calling about (include dose and sig)?: ALPRAZolam (XANAX) 0.25 MG tablet  0.25 mg, 3 TIMES DAILY PRN    Preferred Pharmacy:  [facility] - [place], [place] - [address]  [address]  [place] [address]  Phone: [phone] Fax: [phone]    [facility] [id number] [facility] [employer] - [place] [place] [facility] [address] [facility]  [address] [place] [address]  Phone: [phone] Fax: [phone]      Controlled Substance Agreement on file:   CSA -- Patient Level:     [Media Unavailable] Controlled Substance Agreement - Non - Opioid - Scan on 3/16/2020  2:37 PM: NON-OPIOID CONTROLLED SUBSTANCE AGREEMENT       Who prescribed the medication?: JEN GONZALEZ    Do you need a refill? Yes    When did you use the medication last? Yesterday    Patient offered an appointment? Yes    Do you have any questions or concerns?  No      Could we send this information to you in [facility] or would you prefer to receive a phone call?:   Patient would prefer a phone call   Okay to leave a detailed message?: Yes at

## 2023-03-17 RX ORDER — ALPRAZOLAM 0.25 MG
0.25 TABLET ORAL 3 TIMES DAILY PRN
Qty: 60 TABLET | Refills: 1 | Status: SHIPPED | OUTPATIENT
Start: 2023-03-17 | End: 2023-05-09

## 2023-05-09 ENCOUNTER — OFFICE VISIT (OUTPATIENT)
Dept: FAMILY MEDICINE | Facility: CLINIC | Age: 33
End: 2023-05-09
Payer: COMMERCIAL

## 2023-05-09 VITALS
DIASTOLIC BLOOD PRESSURE: 88 MMHG | OXYGEN SATURATION: 97 % | BODY MASS INDEX: 41.69 KG/M2 | RESPIRATION RATE: 20 BRPM | HEART RATE: 100 BPM | HEIGHT: 69 IN | TEMPERATURE: 98.8 F | WEIGHT: 281.5 LBS | SYSTOLIC BLOOD PRESSURE: 166 MMHG

## 2023-05-09 DIAGNOSIS — F41.1 GAD (GENERALIZED ANXIETY DISORDER): ICD-10-CM

## 2023-05-09 DIAGNOSIS — I10 HYPERTENSION GOAL BP (BLOOD PRESSURE) < 140/90: ICD-10-CM

## 2023-05-09 PROCEDURE — 99213 OFFICE O/P EST LOW 20 MIN: CPT | Performed by: FAMILY MEDICINE

## 2023-05-09 RX ORDER — ALPRAZOLAM 0.25 MG
0.25 TABLET ORAL 3 TIMES DAILY PRN
Qty: 60 TABLET | Refills: 1 | Status: SHIPPED | OUTPATIENT
Start: 2023-05-09 | End: 2023-07-14

## 2023-05-09 RX ORDER — AMLODIPINE BESYLATE 5 MG/1
5 TABLET ORAL DAILY
Qty: 90 TABLET | Refills: 1 | Status: SHIPPED | OUTPATIENT
Start: 2023-05-09 | End: 2023-10-17

## 2023-05-09 ASSESSMENT — PATIENT HEALTH QUESTIONNAIRE - PHQ9
SUM OF ALL RESPONSES TO PHQ QUESTIONS 1-9: 2
SUM OF ALL RESPONSES TO PHQ QUESTIONS 1-9: 2
10. IF YOU CHECKED OFF ANY PROBLEMS, HOW DIFFICULT HAVE THESE PROBLEMS MADE IT FOR YOU TO DO YOUR WORK, TAKE CARE OF THINGS AT HOME, OR GET ALONG WITH OTHER PEOPLE: NOT DIFFICULT AT ALL

## 2023-05-09 ASSESSMENT — PAIN SCALES - GENERAL: PAINLEVEL: NO PAIN (0)

## 2023-05-09 NOTE — PROGRESS NOTES
"  Assessment & Plan     Hypertension goal BP (blood pressure) < 140/90  Blood pressures are significantly better when checked outside the office so I did not make any changes today.  He is not due for any blood work at this time.  Follow-up is recommended in the fall when he will be due for an annual wellness visit.  - amLODIPine (NORVASC) 5 MG tablet; Take 1 tablet (5 mg) by mouth daily    SHIVANI (generalized anxiety disorder)  Alprazolam was refilled at this time.   was checked and appropriate.  - ALPRAZolam (XANAX) 0.25 MG tablet; Take 1 tablet (0.25 mg) by mouth 3 times daily as needed for anxiety             BMI:   Estimated body mass index is 42.08 kg/m  as calculated from the following:    Height as of this encounter: 1.742 m (5' 8.58\").    Weight as of this encounter: 127.7 kg (281 lb 8 oz).           Rayo Toledo MD  [facility]    David Neff is a 32 year old, presenting for the following health issues:  Hypertension        5/9/2023    10:36 AM   Additional Questions   Roomed by Nelia Gorman   Accompanied by N/A     History of Present Illness       Hypertension: He presents for follow up of hypertension.  He does check blood pressure  regularly outside of the clinic. Outside blood pressures have been over 140/90. He does not follow a low salt diet.     He eats 2-3 servings of fruits and vegetables daily.He consumes 0 sweetened beverage(s) daily.He exercises with enough effort to increase his heart rate 10 to 19 minutes per day.  He exercises with enough effort to increase his heart rate 4 days per week.   He is taking medications regularly.    Today's PHQ-9         PHQ-9 Total Score: 2    PHQ-9 Q9 Thoughts of better off dead/self-harm past 2 weeks :   Not at all    How difficult have these problems made it for you to do your work, take care of things at home, or get along with other people: Not difficult at all         Patient is here today for blood pressure " "follow-up.  At home his average with 38 values was 128/78.  It tends to be elevated when he comes to the doctor's office.  He did start the amlodipine and has not noticed any side effects related to it      Review of Systems   Constitutional, HEENT, cardiovascular, pulmonary, gi and gu systems are negative, except as otherwise noted.      Objective    BP (!) 169/97 (BP Location: Right arm, Patient Position: Sitting, Cuff Size: Adult Large)   Pulse 100   Temp 98.8  F (37.1  C) (Oral)   Resp 20   Ht 1.742 m (5' 8.58\")   Wt 127.7 kg (281 lb 8 oz)   SpO2 97%   BMI 42.08 kg/m    Body mass index is 42.08 kg/m .  Physical Exam   GENERAL: healthy, alert and no distress  EYES: Eyes grossly normal to inspection, PERRL and conjunctivae and sclerae normal  NECK: no adenopathy, no asymmetry, masses, or scars and thyroid normal to palpation  RESP: lungs clear to auscultation - no rales, rhonchi or wheezes  CV: regular rate and rhythm, normal S1 S2, no S3 or S4, no murmur, click or rub, no peripheral edema and peripheral pulses strong  MS: no gross musculoskeletal defects noted, no edema  SKIN: no suspicious lesions or rashes  NEURO: Normal strength and tone, mentation intact and speech normal  PSYCH: mentation appears normal, affect normal/bright    Office Visit on 01/09/2023   Component Date Value Ref Range Status     Sodium 01/09/2023 138  136 - 145 mmol/L Final     Potassium 01/09/2023 4.4  3.4 - 5.3 mmol/L Final     Chloride 01/09/2023 102  98 - 107 mmol/L Final     Carbon Dioxide (CO2) 01/09/2023 23  22 - 29 mmol/L Final     Anion Gap 01/09/2023 13  7 - 15 mmol/L Final     Urea Nitrogen 01/09/2023 7.1  6.0 - 20.0 mg/dL Final     Creatinine 01/09/2023 0.87  0.67 - 1.17 mg/dL Final     Calcium 01/09/2023 8.8  8.6 - 10.0 mg/dL Final     Glucose 01/09/2023 121 (H)  70 - 99 mg/dL Final     GFR Estimate 01/09/2023 >90  >60 mL/min/1.73m2 Final    Effective December 21, 2021 eGFRcr in adults is calculated using the 2021 " CKD-EPI creatinine equation which includes age and gender (Taylor et al., NE, DOI: 10.1056/TGCWia5380425)     Albumin Urine mg/L 01/09/2023 196.0  mg/L Final    The reference ranges have not been established in urine albumin. The results should be integrated into the clinical context for interpretation.     Albumin Urine mg/g Cr 01/09/2023 102.62 (H)  0.00 - 17.00 mg/g Cr Final    Microalbuminuria is defined as an albumin:creatinine ratio of 17 to 299 for males and 25 to 299 for females. A ratio of albumin:creatinine of 300 or higher is indicative of overt proteinuria.  Due to biologic variability, positive results should be confirmed by a second, first-morning random or 24-hour timed urine specimen. If there is discrepancy, a third specimen is recommended. When 2 out of 3 results are in the microalbuminuria range, this is evidence for incipient nephropathy and warrants increased efforts at glucose control, blood pressure control, and institution of therapy with an angiotensin-converting-enzyme (ACE) inhibitor (if the patient can tolerate it).       Creatinine Urine mg/dL 01/09/2023 191.0  mg/dL Final    The reference ranges have not been established in urine creatinine. The results should be integrated into the clinical context for interpretation.     Hemoglobin A1C 01/09/2023 5.4  0.0 - 5.6 % Final    Normal <5.7%   Prediabetes 5.7-6.4%    Diabetes 6.5% or higher     Note: Adopted from ADA consensus guidelines.     WBC Count 01/09/2023 4.8  4.0 - 11.0 10e3/uL Final     RBC Count 01/09/2023 4.80  4.40 - 5.90 10e6/uL Final     Hemoglobin 01/09/2023 15.5  13.3 - 17.7 g/dL Final     Hematocrit 01/09/2023 45.1  40.0 - 53.0 % Final     MCV 01/09/2023 94  78 - 100 fL Final     MCH 01/09/2023 32.3  26.5 - 33.0 pg Final     MCHC 01/09/2023 34.4  31.5 - 36.5 g/dL Final     RDW 01/09/2023 15.3 (H)  10.0 - 15.0 % Final     Platelet Count 01/09/2023 163  150 - 450 10e3/uL Final

## 2023-07-13 ENCOUNTER — MYC MEDICAL ADVICE (OUTPATIENT)
Dept: FAMILY MEDICINE | Facility: CLINIC | Age: 33
End: 2023-07-13
Payer: COMMERCIAL

## 2023-07-13 DIAGNOSIS — F41.1 GAD (GENERALIZED ANXIETY DISORDER): ICD-10-CM

## 2023-07-14 RX ORDER — ALPRAZOLAM 0.25 MG
0.25 TABLET ORAL 3 TIMES DAILY PRN
Qty: 60 TABLET | Refills: 1 | Status: SHIPPED | OUTPATIENT
Start: 2023-07-14 | End: 2023-10-17

## 2023-07-14 NOTE — TELEPHONE ENCOUNTER
Please see tigre msg:   Pt requesting alprazolam refill and wondering if needs additional appt for avg BP of 130/80?  Last OV 5/9    Please advise     Requested Prescriptions   Pending Prescriptions Disp Refills     ALPRAZolam (XANAX) 0.25 MG tablet 60 tablet 1     Sig: Take 1 tablet (0.25 mg) by mouth 3 times daily as needed for anxiety       There is no refill protocol information for this order        Aubrie WING RN  [facility]

## 2023-09-05 ENCOUNTER — PATIENT OUTREACH (OUTPATIENT)
Dept: CARE COORDINATION | Facility: CLINIC | Age: 33
End: 2023-09-05

## 2023-09-11 DIAGNOSIS — I10 HYPERTENSION GOAL BP (BLOOD PRESSURE) < 140/90: ICD-10-CM

## 2023-09-11 RX ORDER — LISINOPRIL 30 MG/1
30 TABLET ORAL DAILY
Qty: 90 TABLET | Refills: 3 | Status: SHIPPED | OUTPATIENT
Start: 2023-09-11 | End: 2023-10-17

## 2023-09-11 NOTE — TELEPHONE ENCOUNTER
"Requested Prescriptions   Pending Prescriptions Disp Refills    lisinopril (ZESTRIL) 30 MG tablet [Pharmacy Med Name: LISINOPRIL 30 MG TABLET] 90 tablet 3     Sig: TAKE 1 TABLET BY MOUTH EVERY DAY       ACE Inhibitors (Including Combos) Protocol Failed - 9/11/2023  8:39 AM        Failed - Blood pressure under 140/90 in past 12 months     BP Readings from Last 3 Encounters:   05/09/23 (!) 166/88   01/09/23 (!) 182/100   10/03/22 (!) 152/86                 Passed - Recent (12 mo) or future (30 days) visit within the authorizing provider's specialty     Patient has had an office visit with the authorizing provider or a provider within the authorizing providers department within the previous 12 mos or has a future within next 30 days. See \"Patient Info\" tab in inbasket, or \"Choose Columns\" in Meds & Orders section of the refill encounter.              Passed - Medication is active on med list        Passed - Patient is age 18 or older        Passed - Normal serum creatinine on file in past 12 months     Recent Labs   Lab Test 01/09/23  0944   CR 0.87       Ok to refill medication if creatinine is low          Passed - Normal serum potassium on file in past 12 months     Recent Labs   Lab Test 01/09/23  0944   POTASSIUM 4.4               Routing refill request to provider for review/approval because medication did not pass protocol.    Pt was last seen on 05/09/23    Jo-Ann Bruce RN  [facility]   "

## 2023-09-19 ENCOUNTER — PATIENT OUTREACH (OUTPATIENT)
Dept: CARE COORDINATION | Facility: CLINIC | Age: 33
End: 2023-09-19

## 2023-10-16 DIAGNOSIS — F41.1 GAD (GENERALIZED ANXIETY DISORDER): ICD-10-CM

## 2023-10-16 DIAGNOSIS — I10 HYPERTENSION GOAL BP (BLOOD PRESSURE) < 140/90: ICD-10-CM

## 2023-10-17 RX ORDER — LISINOPRIL 30 MG/1
30 TABLET ORAL DAILY
Qty: 90 TABLET | Refills: 2 | Status: SHIPPED | OUTPATIENT
Start: 2023-10-17 | End: 2024-08-27

## 2023-10-17 RX ORDER — AMLODIPINE BESYLATE 5 MG/1
5 TABLET ORAL DAILY
Qty: 90 TABLET | Refills: 1 | Status: SHIPPED | OUTPATIENT
Start: 2023-10-17 | End: 2024-04-21

## 2023-10-17 RX ORDER — ALPRAZOLAM 0.25 MG
0.25 TABLET ORAL 3 TIMES DAILY PRN
Qty: 60 TABLET | Refills: 1 | Status: SHIPPED | OUTPATIENT
Start: 2023-10-17 | End: 2024-01-12

## 2023-10-17 NOTE — TELEPHONE ENCOUNTER
Lisinopril: change in pharmacy requested, copied Rx from 09/2023 and sent remainder to new pharmacy

## 2023-10-17 NOTE — TELEPHONE ENCOUNTER
General Call    Contacts         Type Contact Phone/Fax    10/16/2023 08:51 PM CDT Phone (Incoming) Joliemarissa Tawanda J (Self) [phone] (H)          Reason for Call:  Pt needs all three prescriptions refilled by Dr. Galeano and sent to a new pharmacy ([facility] in [place], [place]). Can you call pt? Thank you!    Could we send this information to you in [facility] or would you prefer to receive a phone call?:   Patient would prefer a phone call   Okay to leave a detailed message?: Yes at Home number on file [phone] (home)

## 2023-12-03 ENCOUNTER — HEALTH MAINTENANCE LETTER (OUTPATIENT)
Age: 33
End: 2023-12-03

## 2024-01-12 ENCOUNTER — MYC REFILL (OUTPATIENT)
Dept: FAMILY MEDICINE | Facility: CLINIC | Age: 34
End: 2024-01-12

## 2024-01-12 DIAGNOSIS — F41.1 GAD (GENERALIZED ANXIETY DISORDER): ICD-10-CM

## 2024-01-12 RX ORDER — ALPRAZOLAM 0.25 MG
0.25 TABLET ORAL 3 TIMES DAILY PRN
Qty: 60 TABLET | Refills: 1 | Status: SHIPPED | OUTPATIENT
Start: 2024-01-12 | End: 2024-05-21

## 2024-04-21 DIAGNOSIS — I10 HYPERTENSION GOAL BP (BLOOD PRESSURE) < 140/90: ICD-10-CM

## 2024-04-21 RX ORDER — AMLODIPINE BESYLATE 5 MG/1
5 TABLET ORAL DAILY
Qty: 90 TABLET | Refills: 3 | Status: SHIPPED | OUTPATIENT
Start: 2024-04-21 | End: 2024-05-21

## 2024-05-21 ENCOUNTER — OFFICE VISIT (OUTPATIENT)
Dept: INTERNAL MEDICINE | Facility: OTHER | Age: 34
End: 2024-05-21
Attending: INTERNAL MEDICINE
Payer: COMMERCIAL

## 2024-05-21 ENCOUNTER — MYC MEDICAL ADVICE (OUTPATIENT)
Dept: PULMONOLOGY | Facility: OTHER | Age: 34
End: 2024-05-21

## 2024-05-21 VITALS
DIASTOLIC BLOOD PRESSURE: 80 MMHG | OXYGEN SATURATION: 93 % | HEART RATE: 103 BPM | SYSTOLIC BLOOD PRESSURE: 166 MMHG | WEIGHT: 284.4 LBS | BODY MASS INDEX: 42.51 KG/M2 | TEMPERATURE: 99.4 F | RESPIRATION RATE: 18 BRPM

## 2024-05-21 DIAGNOSIS — F41.1 GAD (GENERALIZED ANXIETY DISORDER): ICD-10-CM

## 2024-05-21 DIAGNOSIS — R40.0 DAYTIME SLEEPINESS: ICD-10-CM

## 2024-05-21 DIAGNOSIS — I10 HYPERTENSION GOAL BP (BLOOD PRESSURE) < 140/90: ICD-10-CM

## 2024-05-21 DIAGNOSIS — Z13.29 SCREENING FOR HYPOTHYROIDISM: ICD-10-CM

## 2024-05-21 DIAGNOSIS — I10 PRIMARY HYPERTENSION: Primary | ICD-10-CM

## 2024-05-21 DIAGNOSIS — Z13.220 SCREENING FOR HYPERLIPIDEMIA: ICD-10-CM

## 2024-05-21 LAB
ALBUMIN SERPL BCG-MCNC: 4.5 G/DL (ref 3.5–5.2)
ALP SERPL-CCNC: 70 U/L (ref 40–150)
ALT SERPL W P-5'-P-CCNC: 49 U/L (ref 0–70)
ANION GAP SERPL CALCULATED.3IONS-SCNC: 14 MMOL/L (ref 7–15)
AST SERPL W P-5'-P-CCNC: 45 U/L (ref 0–45)
BASOPHILS # BLD AUTO: 0 10E3/UL (ref 0–0.2)
BASOPHILS NFR BLD AUTO: 0 %
BILIRUB SERPL-MCNC: 0.7 MG/DL
BUN SERPL-MCNC: 8.5 MG/DL (ref 6–20)
CALCIUM SERPL-MCNC: 9.1 MG/DL (ref 8.6–10)
CHLORIDE SERPL-SCNC: 97 MMOL/L (ref 98–107)
CHOLEST SERPL-MCNC: 259 MG/DL
CREAT SERPL-MCNC: 1.15 MG/DL (ref 0.67–1.17)
DEPRECATED HCO3 PLAS-SCNC: 27 MMOL/L (ref 22–29)
EGFRCR SERPLBLD CKD-EPI 2021: 86 ML/MIN/1.73M2
EOSINOPHIL # BLD AUTO: 0.1 10E3/UL (ref 0–0.7)
EOSINOPHIL NFR BLD AUTO: 2 %
ERYTHROCYTE [DISTWIDTH] IN BLOOD BY AUTOMATED COUNT: 13.5 % (ref 10–15)
FASTING STATUS PATIENT QL REPORTED: YES
FASTING STATUS PATIENT QL REPORTED: YES
GLUCOSE SERPL-MCNC: 151 MG/DL (ref 70–99)
HCT VFR BLD AUTO: 41.2 % (ref 40–53)
HDLC SERPL-MCNC: 55 MG/DL
HGB BLD-MCNC: 14.9 G/DL (ref 13.3–17.7)
IMM GRANULOCYTES # BLD: 0 10E3/UL
IMM GRANULOCYTES NFR BLD: 1 %
LDLC SERPL CALC-MCNC: 161 MG/DL
LYMPHOCYTES # BLD AUTO: 0.5 10E3/UL (ref 0.8–5.3)
LYMPHOCYTES NFR BLD AUTO: 8 %
MCH RBC QN AUTO: 36.5 PG (ref 26.5–33)
MCHC RBC AUTO-ENTMCNC: 36.2 G/DL (ref 31.5–36.5)
MCV RBC AUTO: 101 FL (ref 78–100)
MONOCYTES # BLD AUTO: 0.5 10E3/UL (ref 0–1.3)
MONOCYTES NFR BLD AUTO: 7 %
NEUTROPHILS # BLD AUTO: 5.3 10E3/UL (ref 1.6–8.3)
NEUTROPHILS NFR BLD AUTO: 82 %
NONHDLC SERPL-MCNC: 204 MG/DL
PLATELET # BLD AUTO: 135 10E3/UL (ref 150–450)
POTASSIUM SERPL-SCNC: 3.9 MMOL/L (ref 3.4–5.3)
PROT SERPL-MCNC: 7.3 G/DL (ref 6.4–8.3)
RBC # BLD AUTO: 4.08 10E6/UL (ref 4.4–5.9)
SODIUM SERPL-SCNC: 138 MMOL/L (ref 135–145)
TRIGL SERPL-MCNC: 217 MG/DL
TSH SERPL DL<=0.005 MIU/L-ACNC: 1.01 UIU/ML (ref 0.3–4.2)
WBC # BLD AUTO: 6.5 10E3/UL (ref 4–11)

## 2024-05-21 PROCEDURE — 80050 GENERAL HEALTH PANEL: CPT | Performed by: INTERNAL MEDICINE

## 2024-05-21 PROCEDURE — 99203 OFFICE O/P NEW LOW 30 MIN: CPT | Performed by: INTERNAL MEDICINE

## 2024-05-21 PROCEDURE — 36415 COLL VENOUS BLD VENIPUNCTURE: CPT | Performed by: INTERNAL MEDICINE

## 2024-05-21 PROCEDURE — 80061 LIPID PANEL: CPT | Performed by: INTERNAL MEDICINE

## 2024-05-21 RX ORDER — ALPRAZOLAM 0.25 MG
0.25 TABLET ORAL 3 TIMES DAILY PRN
Qty: 60 TABLET | Refills: 1 | Status: SHIPPED | OUTPATIENT
Start: 2024-05-21 | End: 2024-05-21

## 2024-05-21 RX ORDER — CLONAZEPAM 0.5 MG/1
0.5 TABLET ORAL 2 TIMES DAILY PRN
Qty: 60 TABLET | Refills: 0 | Status: SHIPPED | OUTPATIENT
Start: 2024-05-21 | End: 2024-06-18

## 2024-05-21 RX ORDER — AMLODIPINE BESYLATE 5 MG/1
10 TABLET ORAL DAILY
COMMUNITY
Start: 2024-05-21 | End: 2024-08-27

## 2024-05-21 ASSESSMENT — PATIENT HEALTH QUESTIONNAIRE - PHQ9
10. IF YOU CHECKED OFF ANY PROBLEMS, HOW DIFFICULT HAVE THESE PROBLEMS MADE IT FOR YOU TO DO YOUR WORK, TAKE CARE OF THINGS AT HOME, OR GET ALONG WITH OTHER PEOPLE: SOMEWHAT DIFFICULT
SUM OF ALL RESPONSES TO PHQ QUESTIONS 1-9: 4
SUM OF ALL RESPONSES TO PHQ QUESTIONS 1-9: 4

## 2024-05-21 ASSESSMENT — PAIN SCALES - GENERAL: PAINLEVEL: NO PAIN (0)

## 2024-05-21 NOTE — PROGRESS NOTES
Assessment & Plan   Problem List Items Addressed This Visit          Circulatory    Hypertension goal BP (blood pressure) < 140/90    Relevant Medications    amLODIPine (NORVASC) 5 MG tablet       Behavioral    SHIVANI (generalized anxiety disorder)    Relevant Medications    clonazePAM (KLONOPIN) 0.5 MG tablet     Other Visit Diagnoses       Primary hypertension    -  Primary    Relevant Orders    Comprehensive metabolic panel (BMP + Alb, Alk Phos, ALT, AST, Total. Bili, TP)    CBC with platelets and differential    Screening for hyperlipidemia        Relevant Orders    Lipid Profile (Chol, Trig, HDL, LDL calc)    Daytime sleepiness        Relevant Orders    Adult Sleep Eval & Management  Referral    Screening for hypothyroidism        Relevant Orders    TSH with free T4 reflex           We will go ahead and increase his amlodipine to 10 mg daily.  Patient will check his blood pressures and let us know the measurements in the next couple weeks.  In addition he was switched to clonazepam today at 0.5 mg twice daily as needed.           No follow-ups on file.      David Neff is a 33 year old, presenting for the following health issues:  Establish Care    History of Present Illness       Reason for visit:  Establish primary care    He eats 2-3 servings of fruits and vegetables daily.He consumes 0 sweetened beverage(s) daily.He exercises with enough effort to increase his heart rate 10 to 19 minutes per day.  He exercises with enough effort to increase his heart rate 3 or less days per week.   He is taking medications regularly.     Establish Care  Previously seen at [facility]- Dr. Paris Neff presents today to [facility].  He has a history of obesity, depression/anxiety, and hypertension.  Currently is on amlodipine 5 mg daily and Zestril 30 mg daily.  Blood pressures not at goal today at 166/80.  He has not been checking his blood pressures as of late.  In addition he does  report some daytime sleepiness.  He questions whether or not he may have sleep apnea and would like to be seen by sleep medicine for evaluation of this.  In addition he has been on Xanax for quite a while in relation to his anxiety however his siblings are on clonazepam which they informed him seems to work better than Xanax.  Patient would like to try this.    Hypertension Follow-up    Do you check your blood pressure regularly outside of the clinic? No   Are you following a low salt diet? No  Are your blood pressures ever more than 140 on the top number (systolic) OR more   than 90 on the bottom number (diastolic), for example 140/90? Yes high in doctors office and lower at home    Depression and Anxiety   How are you doing with your depression since your last visit? No change  How are you doing with your anxiety since your last visit?  Worsened pretty stressed out  Are you having other symptoms that might be associated with depression or anxiety? Yes:  Sleep problems but possibly related to apnea  Have you had a significant life event? OTHER: Career change and relocation in August 2024    Do you have any concerns with your use of alcohol or other drugs? No    Social History     Tobacco Use    Smoking status: Never    Smokeless tobacco: Never   Vaping Use    Vaping status: Never Used   Substance Use Topics    Alcohol use: Yes     Alcohol/week: 10.0 standard drinks of alcohol     Types: 10 Standard drinks or equivalent per week    Drug use: Not Currently         10/3/2022     8:27 AM 5/9/2023     7:37 AM 5/21/2024     8:36 AM   PHQ   PHQ-9 Total Score 0 2 4   Q9: Thoughts of better off dead/self-harm past 2 weeks Not at all Not at all Not at all         3/10/2020    10:39 AM 12/14/2020     9:34 AM 10/3/2022     8:36 AM   SHIVANI-7 SCORE   Total Score 4 2 2         5/21/2024     8:36 AM   Last PHQ-9   1.  Little interest or pleasure in doing things 1   2.  Feeling down, depressed, or hopeless 0   3.  Trouble falling or  staying asleep, or sleeping too much 1   4.  Feeling tired or having little energy 1   5.  Poor appetite or overeating 1   6.  Feeling bad about yourself 0   7.  Trouble concentrating 0   8.  Moving slowly or restless 0   Q9: Thoughts of better off dead/self-harm past 2 weeks 0   PHQ-9 Total Score 4         10/3/2022     8:36 AM   SHIVANI-7    1. Feeling nervous, anxious, or on edge 1   2. Not being able to stop or control worrying 0   3. Worrying too much about different things 1   4. Trouble relaxing 0   5. Being so restless that it is hard to sit still 0   6. Becoming easily annoyed or irritable 0   7. Feeling afraid, as if something awful might happen 0   SHIVANI-7 Total Score 2   If you checked any problems, how difficult have they made it for you to do your work, take care of things at home, or get along with other people? Somewhat difficult       Suicide Assessment Five-step Evaluation and Treatment (SAFE-T)            Review of Systems  Constitutional, HEENT, cardiovascular, pulmonary, gi and gu systems are negative, except as otherwise noted.      Objective    BP (!) 166/80 (BP Location: Left arm, Patient Position: Sitting, Cuff Size: Adult Regular)   Pulse 103   Temp 99.4  F (37.4  C) (Tympanic)   Resp 18   Wt 129 kg (284 lb 6.4 oz)   SpO2 93%   BMI 42.51 kg/m    Body mass index is 42.51 kg/m .  Physical Exam   GENERAL: alert and no distress  EYES: Eyes grossly normal to inspection, PERRL and conjunctivae and sclerae normal  HENT: ear canals and TM's normal, nose and mouth without ulcers or lesions  RESP: lungs clear to auscultation - no rales, rhonchi or wheezes  CV: regular rate and rhythm, normal S1 S2, no S3 or S4, no murmur, click or rub, no peripheral edema  ABDOMEN: soft, nontender, no hepatosplenomegaly, no masses and bowel sounds normal  MS: no gross musculoskeletal defects noted, no edema  PSYCH: mentation appears normal, affect normal/bright    Office Visit on 01/09/2023   Component Date Value Ref  Range Status    Sodium 01/09/2023 138  136 - 145 mmol/L Final    Potassium 01/09/2023 4.4  3.4 - 5.3 mmol/L Final    Chloride 01/09/2023 102  98 - 107 mmol/L Final    Carbon Dioxide (CO2) 01/09/2023 23  22 - 29 mmol/L Final    Anion Gap 01/09/2023 13  7 - 15 mmol/L Final    Urea Nitrogen 01/09/2023 7.1  6.0 - 20.0 mg/dL Final    Creatinine 01/09/2023 0.87  0.67 - 1.17 mg/dL Final    Calcium 01/09/2023 8.8  8.6 - 10.0 mg/dL Final    Glucose 01/09/2023 121 (H)  70 - 99 mg/dL Final    GFR Estimate 01/09/2023 >90  >60 mL/min/1.73m2 Final    Effective December 21, 2021 eGFRcr in adults is calculated using the 2021 CKD-EPI creatinine equation which includes age and gender (Taylor et al., [place], DOI: 10.1056/YBPAtf3153103)    Albumin Urine mg/L 01/09/2023 196.0  mg/L Final    The reference ranges have not been established in urine albumin. The results should be integrated into the clinical context for interpretation.    Albumin Urine mg/g Cr 01/09/2023 102.62 (H)  0.00 - 17.00 mg/g Cr Final    Microalbuminuria is defined as an albumin:creatinine ratio of 17 to 299 for males and 25 to 299 for females. A ratio of albumin:creatinine of 300 or higher is indicative of overt proteinuria.  Due to biologic variability, positive results should be confirmed by a second, first-morning random or 24-hour timed urine specimen. If there is discrepancy, a third specimen is recommended. When 2 out of 3 results are in the microalbuminuria range, this is evidence for incipient nephropathy and warrants increased efforts at glucose control, blood pressure control, and institution of therapy with an angiotensin-converting-enzyme (ACE) inhibitor (if the patient can tolerate it).      Creatinine Urine mg/dL 01/09/2023 191.0  mg/dL Final    The reference ranges have not been established in urine creatinine. The results should be integrated into the clinical context for interpretation.    Hemoglobin A1C 01/09/2023 5.4  0.0 - 5.6 % Final    Normal  <5.7%   Prediabetes 5.7-6.4%    Diabetes 6.5% or higher     Note: Adopted from ADA consensus guidelines.    WBC Count 01/09/2023 4.8  4.0 - 11.0 10e3/uL Final    RBC Count 01/09/2023 4.80  4.40 - 5.90 10e6/uL Final    Hemoglobin 01/09/2023 15.5  13.3 - 17.7 g/dL Final    Hematocrit 01/09/2023 45.1  40.0 - 53.0 % Final    MCV 01/09/2023 94  78 - 100 fL Final    MCH 01/09/2023 32.3  26.5 - 33.0 pg Final    MCHC 01/09/2023 34.4  31.5 - 36.5 g/dL Final    RDW 01/09/2023 15.3 (H)  10.0 - 15.0 % Final    Platelet Count 01/09/2023 163  150 - 450 10e3/uL Final     Results for orders placed or performed in visit on 05/21/24   CBC with platelets and differential     Status: None (In process)    Narrative    The following orders were created for panel order CBC with platelets and differential.  Procedure                               Abnormality         Status                     ---------                               -----------         ------                     CBC with platelets and d...[[id number]]                      In process                   Please view results for these tests on the individual orders.     Results for orders placed or performed in visit on 05/21/24 (from the past 24 hour(s))   CBC with platelets and differential    Narrative    The following orders were created for panel order CBC with platelets and differential.  Procedure                               Abnormality         Status                     ---------                               -----------         ------                     CBC with platelets and d...[[id number]]                      In process                   Please view results for these tests on the individual orders.           Signed Electronically by: Brooks Tena DO

## 2024-06-08 ASSESSMENT — SLEEP AND FATIGUE QUESTIONNAIRES
HOW LIKELY ARE YOU TO NOD OFF OR FALL ASLEEP WHILE SITTING QUIETLY AFTER LUNCH WITHOUT ALCOHOL: MODERATE CHANCE OF DOZING
HOW LIKELY ARE YOU TO NOD OFF OR FALL ASLEEP WHEN YOU ARE A PASSENGER IN A CAR FOR AN HOUR WITHOUT A BREAK: HIGH CHANCE OF DOZING
HOW LIKELY ARE YOU TO NOD OFF OR FALL ASLEEP WHILE SITTING AND READING: MODERATE CHANCE OF DOZING
HOW LIKELY ARE YOU TO NOD OFF OR FALL ASLEEP WHILE SITTING INACTIVE IN A PUBLIC PLACE: MODERATE CHANCE OF DOZING
HOW LIKELY ARE YOU TO NOD OFF OR FALL ASLEEP IN A CAR, WHILE STOPPED FOR A FEW MINUTES IN TRAFFIC: WOULD NEVER DOZE
HOW LIKELY ARE YOU TO NOD OFF OR FALL ASLEEP WHILE LYING DOWN TO REST IN THE AFTERNOON WHEN CIRCUMSTANCES PERMIT: HIGH CHANCE OF DOZING
HOW LIKELY ARE YOU TO NOD OFF OR FALL ASLEEP WHILE SITTING AND TALKING TO SOMEONE: WOULD NEVER DOZE
HOW LIKELY ARE YOU TO NOD OFF OR FALL ASLEEP WHILE WATCHING TV: MODERATE CHANCE OF DOZING

## 2024-06-11 ENCOUNTER — TELEPHONE (OUTPATIENT)
Dept: PULMONOLOGY | Facility: OTHER | Age: 34
End: 2024-06-11

## 2024-06-11 NOTE — PROGRESS NOTES
06/08/24 0503   Reason For Your Visit   Please briefly describe the main reason(s) for your sleep visit Extreme day time tiredness   Approximately when did this problem start Within the last year   What are your goals for this visit Remedy my sleep issues   Time in Bed - Work Or School Days   Do you work or go to school Yes   What time do you usually get into bed 10pm   About how long does it take you to fall asleep It depends   How often do you have trouble falling asleep 2-3+   How often do you wake up during the night 1-3   Do you work days/evenings/nights/rotating shifts Days   What wakes you up at night Snorting self awake;External stimuli (bed partner, pets, noise, etc);Use the bathroom;Anxiety   How often do you have trouble falling back to sleep 1-2   About how long does it take to fall back to sleep Usually fast   What do you usually do if you have trouble getting back to sleep Keep trying   What time do you usually get out of bed to start your day 8am   Do you use an alarm No   Time in Bed - Weekends/Non-work Days/All Other Days   What time do you usually get into bed 11-1130pm   About how long does it take you to fall asleep <1hr   What time do you usually get out of bed to start your day 10am   Do you use an alarm No   Sleep Need   On average, about how much sleep do you think you get 7-8hrs   About how much sleep do you think you need 6-7hrs but my quality of sleep is not there. Not achieving deep sleep.   Sleep Position   Which sleep positions do you prefer Back   Do you do any of the following activities in bed Read;Watch TV;Use phone, computer, or tablet   How often do you take a nap on purpose zero   About how long are your naps I don' take them unless I fall asleep at work when I shouldn't.   Do you feel better after naps No   How often do you doze off unintentionally 5. Work days in the morning to lunch-time period.   Have you ever had a driving accident or near-miss due to  sleepiness/drowsiness Yes   Sleep Disruptions - Breathing/Snoring   Do you snore Yes   Do other people complain about your snoring Yes   Have you been told you stop breathing in your sleep Yes   Do you have issues with any of the following Morning mouth dryness;Stuffy nose when you wake up;Heartburn or reflux at night;Getting up to urinate more than once   Sleep Disruptions - Movement   Do you get pain, discomfort, with an urge to move Yes   Does it happen when you are resting Yes   Does it get better if you move around Yes   Does it happen more at night Yes   Have you been told you kick your legs at night Yes   Sleep Disruptions - Behaviours in Sleep   Have you ever experienced any of the following during your sleep Eating;Sleep-talking;Sleepwalking;Teeth grinding;Waking up paralyzed   Do you ever experience sudden muscle weakness during the day No   4) Is there anything else you would like your sleep provider to know I have severe daytime drowsiness, it's affecting my work and livelihood, apnea runs in my family, I know I snore and although I live alone, I catch myself jerking awake from stopping breathing. Trying to take the next steps in diagnosing the problem.   Caffeine, Alcohol and Other Substances   How many caffeinated beverages (coffee, tea, soda, energy drinks) per day 1-2   What time of day is your last caffeine use 10-11am   List any prescribed or over the counter stimulants that you take No stimulants other than caffeine   List any prescribed or over the counter sleep medication you take No sleep medication but I do use low dose alprazolam/clonazepam for anxiety.   List previous sleep medications you have tried Nothing other than benadryl, but haven't taken that in a long time.   Do you drink alcohol to help you sleep No   Do you drink alcohol near bedtime No   Family History   Has any family member been diagnosed with a sleep disorder Yes   If yes, please indicate Sleep apnea   In the last TWO WEEKS  have you experienced any of the following symptoms?   Fevers No   Weight Gain No   Pain at Night Yes   Double Vision No   Changes in Vision No   Difficulty Breathing through Nose Yes   Sore Throat in Morning Yes   Dry Mouth in the Morning Yes   Shortness of Breath Lying Flat No   Shortness of Breath With Activity Yes   Awakening with Shortness of Breath No   Increased Cough No   Heart Racing at Night No   Swelling in Feet or Legs Yes   Diarrhea at Night No   Heartburn at Night No   Urinating More than Once at Night Yes   Losing Control of Urine at Night No   Joint Pains at Night No   Headaches in Morning No   Weakness in Arms or Legs No   Depressed Mood Yes   Anxiety Yes         6/8/2024     4:59 PM    Olga Sleepiness Scale ( RUKHSANA Rosenthal  [id number]<br>ESS - [place]/English - Final version - 21 Nov 07 - [facility] [place].)   Sitting and reading Moderate chance of dozing   Watching TV Moderate chance of dozing   Sitting, inactive in a public place (e.g. a theatre or a meeting) Moderate chance of dozing   As a passenger in a car for an hour without a break High chance of dozing   Lying down to rest in the afternoon when circumstances permit High chance of dozing   Sitting and talking to someone Would never doze   Sitting quietly after a lunch without alcohol Moderate chance of dozing   In a car, while stopped for a few minutes in traffic Would never doze   Olga Score (MC) 14   Olga Score (Sleep) 14         6/8/2024     4:55 PM   Insomnia Severity Index (ARTHUR)   Difficulty falling asleep 1   Difficulty staying asleep 1   Problems waking up too early 3   How SATISFIED/DISSATISFIED are you with your CURRENT sleep pattern? 4   How NOTICEABLE to others do you think your sleep problem is in terms of impairing the quality of your life? 4   How WORRIED/DISTRESSED are you about your current sleep problem? 4   To what extent do you consider your sleep problem to INTERFERE with your daily functioning (e.g. daytime  fatigue, mood, ability to function at work/daily chores, concentration, memory, mood, etc.) CURRENTLY? 4   ARTHUR Total Score 21         6/8/2024     4:57 PM   STOP BANG Questionnaire (  2008, the American Society of Anesthesiologists, Inc. [employer]   1. Snoring - Do you snore loudly (louder than talking or loud enough to be heard through closed doors)? Yes   2. Tired - Do you often feel tired, fatigued, or sleepy during daytime? Yes   3. Observed - Has anyone observed you stop breathing during your sleep? Yes   4. Blood pressure - Do you have or are you being treated for high blood pressure? Yes   5. BMI - BMI more than 35 kg/m2? Yes   6. Age - Age over 50 yr old? No   7. Neck circumference - Neck circumference greater than 40 cm? Yes   8. Gender - Gender male? Yes   STOP BANG Score (MC): 6 (High risk of ALEJANDRO)

## 2024-06-11 NOTE — TELEPHONE ENCOUNTER
"Chart review prior to sleep testing.    Patient Summary:  33 year old male who is referred for sleep disordered breathing.    Patient Active Problem List    Diagnosis Date Noted    Recurrent major depressive disorder, in full remission (H24) 01/09/2023     Priority: Medium    SHIVANI (generalized anxiety disorder) 03/10/2020     Priority: Medium     Patient is followed by JEN GONZALEZ for ongoing prescription of benzodiazepines.  All refills should be approved by this provider, or covering partner.    Medication(s): alprazolam.   Maximum quantity per month: 60  Clinic visit frequency required: Q 6  months     Controlled substance agreement on file: Yes       Date(s): 03/10/2020  Benzodiazepine use reviewed by psychiatry:  Yes       Date(s): 03/10/2020    Last [facility] website verification:  done on 03/10/2020  [email or web address]          Obesity (BMI 35.0-39.9) with comorbidity (H) 06/13/2019     Priority: Medium    Hypertension goal BP (blood pressure) < 140/90 08/12/2014     Priority: Medium    Major depression in complete remission (H) 08/04/2014     Priority: Medium    CARDIOVASCULAR SCREENING; LDL GOAL LESS THAN 160 10/31/2010     Priority: Medium       Current Outpatient Medications   Medication Sig Dispense Refill    amLODIPine (NORVASC) 5 MG tablet Take 2 tablets (10 mg) by mouth daily      clonazePAM (KLONOPIN) 0.5 MG tablet Take 1 tablet (0.5 mg) by mouth 2 times daily as needed for anxiety 60 tablet 0    lisinopril (ZESTRIL) 30 MG tablet Take 1 tablet (30 mg) by mouth daily 90 tablet 2     No current facility-administered medications for this visit.       Pertinent PMHx of MDD, anxiety, HTN, obesity.    STOP-BANG score of 6, with unknown neck circumference.  Husser score of 14.  ARTHUR: 21    BMI of Estimated body mass index is 42.51 kg/m  as calculated from the following:    Height as of 5/9/23: 1.742 m (5' 8.58\").    Weight as of an earlier encounter on 5/21/24: 129 kg (284 lb 6.4 " "oz).     Chief concern per questionnaire: \"Extreme day time tiredness \"    Duration of symptoms:  \"Within the last year \"    Goals for visit per questionnaire: \"    Remedy my sleep issues   \"    Sleep pattern:  Workdays.  10pm - 8am.  Weekends.  11pm - 11:30pm to 10am.  Time to fall asleep: ~variable minutes.  Awakenings: 1-3 times per night, few minutes to return to sleep.  Average total sleep time:  7-8 hours  Napping.  0 days per week, - hours per nap.    Yes for read, TV, phone in bed.    Yes for RLS screen.  Yes for sleep walking.  No for dream enactment behavior.  Yes for bruxism.    No for morning headaches.  Yes for snoring.  Yes for observed apnea.  Yes for FHx of ALEJANDRO.    Caffeine use:  No for 3+ per day.  No for within 6 hours of bed.    A/P:    1.)  High likelihood of ALEJANDRO with STOP-BANG score of 6.   - Would appear to be candidate for either home sleep testing or in-lab PSG.    ---  This note was written with the assistance of the Dragon voice-dictation technology software. The final document, although reviewed, may contain errors. For corrections, please contact the office.    Robby Obregon MD    Sleep Medicine  [facility] [place], [place]  Main Office: [phone]  [facility] [place]  [address] [place] [address], [address]  Schedule visits: [phone]  Main Office: [phone]  Fax: [phone]    "

## 2024-06-17 DIAGNOSIS — R40.0 SLEEPINESS: Primary | ICD-10-CM

## 2024-06-18 ENCOUNTER — TELEPHONE (OUTPATIENT)
Dept: INTERNAL MEDICINE | Facility: OTHER | Age: 34
End: 2024-06-18

## 2024-06-18 DIAGNOSIS — Z13.1 SCREENING FOR DIABETES MELLITUS: Primary | ICD-10-CM

## 2024-06-18 DIAGNOSIS — E78.5 HYPERLIPIDEMIA LDL GOAL <130: ICD-10-CM

## 2024-06-18 DIAGNOSIS — F41.1 GAD (GENERALIZED ANXIETY DISORDER): ICD-10-CM

## 2024-06-18 RX ORDER — CLONAZEPAM 0.5 MG/1
0.5 TABLET ORAL 2 TIMES DAILY PRN
Qty: 60 TABLET | Refills: 0 | Status: SHIPPED | OUTPATIENT
Start: 2024-06-18 | End: 2024-07-17 | Stop reason: ALTCHOICE

## 2024-06-18 RX ORDER — ATORVASTATIN CALCIUM 20 MG/1
20 TABLET, FILM COATED ORAL DAILY
Qty: 30 TABLET | Refills: 3 | Status: SHIPPED | OUTPATIENT
Start: 2024-06-18

## 2024-06-18 NOTE — TELEPHONE ENCOUNTER
1:42 PM    Reason for Call: Phone Call    Description: Patient needing to speak to the nurse regarding a blood test and going a medication. Patient is at work and he doesn't have access to the phone it is hit and miss and he would like a phone call back or can send a message in [employer]    Was an appointment offered for this call? No  If yes : Appointment type              Date    Preferred method for responding to this message: Telephone Call or [employer]  What is your phone number ? [phone]     If we cannot reach you directly, may we leave a detailed response at the number you provided? No    Can this message wait until your PCP/provider returns, if available today? YES, No

## 2024-06-18 NOTE — TELEPHONE ENCOUNTER
Routing refill request to provider for review/approval because:  Drug not on the [facility] or [facility] refill protocol or controlled substance

## 2024-06-18 NOTE — TELEPHONE ENCOUNTER
Brooks Tena, DO  You31 minutes ago (2:57 PM)     AB  Start stating right away    Labs in a week+       Call placed to patient update provided Dr. Tena has sent atorvastatin to the pharmacy and clonazepam refill as well. Dr. Tena is requesting lab appointment in a week+ after starting on the statin medication.     Lab Appointment has been rescheduled to 6/27/24 to allow time for patient to  the statin from the pharmacy. Patient verbalized understanding.

## 2024-06-18 NOTE — TELEPHONE ENCOUNTER
Klonopin       Last Written Prescription Date:  5/21/24  Last Fill Quantity: 60,   # refills: 0  Last Office Visit: 5/21/2024  Future Office visit:

## 2024-06-18 NOTE — TELEPHONE ENCOUNTER
Call returned to patient. Patient has decided to be tested for Diabetes and start on a statin medication based upon lipid results from 5/21/24.    [facility] [place].     Lab only appointment scheduled for 6/25/24 at 815 am.

## 2024-06-27 ENCOUNTER — LAB (OUTPATIENT)
Dept: LAB | Facility: OTHER | Age: 34
End: 2024-06-27
Attending: FAMILY MEDICINE
Payer: COMMERCIAL

## 2024-06-27 ENCOUNTER — VIRTUAL VISIT (OUTPATIENT)
Dept: SLEEP MEDICINE | Facility: HOSPITAL | Age: 34
End: 2024-06-27
Attending: FAMILY MEDICINE
Payer: COMMERCIAL

## 2024-06-27 DIAGNOSIS — R40.0 SLEEPINESS: Primary | ICD-10-CM

## 2024-06-27 DIAGNOSIS — Z13.1 SCREENING FOR DIABETES MELLITUS: ICD-10-CM

## 2024-06-27 LAB
ALBUMIN SERPL BCG-MCNC: 4.2 G/DL (ref 3.5–5.2)
ALP SERPL-CCNC: 52 U/L (ref 40–150)
ALT SERPL W P-5'-P-CCNC: 53 U/L (ref 0–70)
ANION GAP SERPL CALCULATED.3IONS-SCNC: 13 MMOL/L (ref 7–15)
AST SERPL W P-5'-P-CCNC: 68 U/L (ref 0–45)
BILIRUB SERPL-MCNC: 0.6 MG/DL
BUN SERPL-MCNC: 7.3 MG/DL (ref 6–20)
CALCIUM SERPL-MCNC: 8.5 MG/DL (ref 8.6–10)
CHLORIDE SERPL-SCNC: 98 MMOL/L (ref 98–107)
CREAT SERPL-MCNC: 1.11 MG/DL (ref 0.67–1.17)
DEPRECATED HCO3 PLAS-SCNC: 26 MMOL/L (ref 22–29)
EGFRCR SERPLBLD CKD-EPI 2021: 89 ML/MIN/1.73M2
EST. AVERAGE GLUCOSE BLD GHB EST-MCNC: 108 MG/DL
GLUCOSE SERPL-MCNC: 149 MG/DL (ref 70–99)
HBA1C MFR BLD: 5.4 %
POTASSIUM SERPL-SCNC: 4 MMOL/L (ref 3.4–5.3)
PROT SERPL-MCNC: 6.3 G/DL (ref 6.4–8.3)
SODIUM SERPL-SCNC: 137 MMOL/L (ref 135–145)

## 2024-06-27 PROCEDURE — 36415 COLL VENOUS BLD VENIPUNCTURE: CPT

## 2024-06-27 PROCEDURE — 80053 COMPREHEN METABOLIC PANEL: CPT

## 2024-06-27 PROCEDURE — 83036 HEMOGLOBIN GLYCOSYLATED A1C: CPT

## 2024-06-27 NOTE — PROGRESS NOTES
Patient was provided both verbal and written education and instructions on use of Watch PAT device. Watch PAT device has been registered and shipped via [employer] on 6/27/2024. Patient was notified that package was mailed out. Watch [facility] serial number: [id number].    Tracking # [id number]

## 2024-07-17 ENCOUNTER — TELEPHONE (OUTPATIENT)
Dept: INTERNAL MEDICINE | Facility: OTHER | Age: 34
End: 2024-07-17

## 2024-07-17 DIAGNOSIS — F41.1 GAD (GENERALIZED ANXIETY DISORDER): Primary | ICD-10-CM

## 2024-07-17 RX ORDER — ALPRAZOLAM 0.25 MG
0.25 TABLET ORAL 3 TIMES DAILY PRN
Qty: 60 TABLET | Refills: 1 | Status: SHIPPED | OUTPATIENT
Start: 2024-07-17 | End: 2024-08-27

## 2024-07-17 NOTE — TELEPHONE ENCOUNTER
clonazepam isnt' working as well as the alprazolam. He would like to switch back to the alprazolam       ALPRAZolam (XANAX) 0.25 MG tablet (Discontinued) 60 tablet 1 5/21/2024 5/21/2024 No   Sig - Route: Take 1 tablet (0.25 mg) by mouth 3 times daily as needed for anxiety - Oral       Order pended.

## 2024-07-17 NOTE — TELEPHONE ENCOUNTER
8:12 AM    Reason for Call: Phone Call    Description: Patient was told to call if the clonazepam isnt' working as well as the alprazolam. He would like to switch back to the alprazolam. He would like to know if he needs an appointment to do so.     Was an appointment offered for this call? No  If yes : Appointment type              Date    Preferred method for responding to this message: Telephone Call/[facility] message  What is your phone number ?    If we cannot reach you directly, may we leave a detailed response at the number you provided? Yes    Can this message wait until your PCP/provider returns, if available today? Not applicable    Glendy Vick

## 2024-07-21 DIAGNOSIS — I10 HYPERTENSION GOAL BP (BLOOD PRESSURE) < 140/90: ICD-10-CM

## 2024-07-21 RX ORDER — LISINOPRIL 30 MG/1
30 TABLET ORAL DAILY
Qty: 90 TABLET | Refills: 2 | OUTPATIENT
Start: 2024-07-21

## 2024-08-06 ENCOUNTER — DOCUMENTATION ONLY (OUTPATIENT)
Dept: SLEEP MEDICINE | Facility: HOSPITAL | Age: 34
End: 2024-08-06
Attending: FAMILY MEDICINE
Payer: COMMERCIAL

## 2024-08-06 DIAGNOSIS — R40.0 SLEEPINESS: ICD-10-CM

## 2024-08-06 PROCEDURE — 95800 SLP STDY UNATTENDED: CPT | Mod: 26 | Performed by: FAMILY MEDICINE

## 2024-08-06 PROCEDURE — 95800 SLP STDY UNATTENDED: CPT

## 2024-08-06 NOTE — PROCEDURES
"WatchPAT - HOME SLEEP STUDY INTERPRETATION    Patient: Tawanda Ramos  MRN: [id number]  YOB: 1990  Study Date: 7/31/2024  Referring Provider: Brooks Tena  Ordering Provider: Robby Obregon MD, MD    Chain of custody patient verification was not enabled.       Indications for Home Study: Tawanda Ramos is a 33 year old male with a history of MDD, anxiety, HTN, obesity  who presents with symptoms suggestive of obstructive sleep apnea.    Estimated body mass index is 42.51 kg/m  as calculated from the following:    Height as of 5/9/23: 1.742 m (5' 8.58\").    Weight as of 5/21/24: 129 kg (284 lb 6.4 oz).  Total score - [place]: 14 (6/8/2024  4:59 PM)  Total Score: 7 (6/8/2024  4:57 PM)    Data: A full night home sleep study was performed recording the standard physiologic parameters including peripheral arterial tonometry (PAT), sound/snoring, body position,  movement, sound, and oxygen saturation by pulse oximetry. Pulse rate was estimated by oximetry recording. Sleep staging (wake, REM, light, and deep sleep) was derived from PAT signal.  This study was considered adequate based on > 4 hours of quality oximetry and respiratory recording. As specified by the AASM Manual for the Scoring of Sleep and Associated events, version 2.3, Rule VIII.D 1B, 4% oxygen desaturation scoring for hypopneas is used as a standard of care on all home sleep apnea testing.    Total Recording Time: 7 hrs, 25 min  Total Sleep Time: 6 hrs, 50 min  % of Sleep Time REM: 11.2%    Respiratory:  Snoring: Snoring was present.  Respiratory events: The PAT respiratory disturbance index [pRDI] was 43.8 events per hour.  The PAT apnea/hypopnea index [pAHI] was 43.2 events per hour.  TYREE was 42.3 events per hour.  During REM sleep the pAHI was 35.7.  Sleep Associated Hypoxemia: sustained hypoxemia was present, predominantly in first half of recording. Mean oxygen saturation was 89%.  Minimum was 74%.  Time with " saturation less than 88% was 146.3 minutes.    Heart Rate: By pulse oximetry normal rate was noted.  Per cardiac rhythm analysis, 2 minutes labeled as atrial fibrillation.    Position: Percent of time spent: supine - 91.8%, prone - 0.2%, on right - 0%, on left - 7.9%.  pAHI was 46 per hour supine, - per hour prone, - per hour on right side, and 13 per hour on left side.     Assessment:   Severe obstructive sleep apnea.  Sleep associated hypoxemia was present.    Recommendations:  Consider auto-CPAP at 5-15 cmH2O or polysomnography with full night PAP titration.  Suggest optimizing sleep hygiene and avoiding sleep deprivation.  Weight management.    Diagnosis Code(s): Obstructive Sleep Apnea G47.33, Hypoxemia G47.36    Robby Obregon MD, MD, August 6, 2024   Diplomate, American Board of Family Medicine, Sleep Medicine

## 2024-08-06 NOTE — PROGRESS NOTES
WatchPAT data has been received and has been scored using rule 1B, 4%. Patient to follow up with provider to determine appropriate therapy.    Pat AHI: 43.2    Ordering Provider: Dr Robby Obregon      Sleep Technician/Technologist: Maricarmen PINEDA

## 2024-08-12 NOTE — PROGRESS NOTES
"Tawanda Ramos is a 33 year old male who is being evaluated via a billable video visit.       The patient has been notified of following:      \"This video visit will be conducted via a call between you and your physician/provider. We have found that certain health care needs can be provided without the need for an in-person physical exam.  This service lets us provide the care you need with a video conversation.  If a prescription is necessary we can send it directly to your pharmacy.  If lab work is needed we can place an order for that and you can then stop by our lab to have the test done at a later time.     Video visits are billed at different rates depending on your insurance coverage.  Please reach out to your insurance provider with any questions.     If during the course of the call the physician/provider feels a video visit is not appropriate, you will not be charged for this service.\"     Patient has given verbal consent for Video visit? Yes  How would you like to obtain your AVS? Mail a copy  If you are dropped from the video visit, the video invite should be resent to: Text to cell phone: -  Will anyone else be joining your video visit? No  If patient encounters technical issues they should call [phone]      Video-Visit Details     Type of service:  Video Visit     Start Time:  2pm  End Time: 2:19 PM    Originating Location (pt. Location): Home     Distant Location (provider location):  Off-site, [facility]       Platform used for Video Visit: Mobile2Me    Virtual visit for review of home sleep testing results.     A/P:     1.)  Severe ALEJANDRO (pAHI 43.2) with sustained hypoxemia predominantly in first half of recording. Mean oxygen saturation was 89%.  Minimum was 74%.  Time with saturation less than 88% was 146.3 minutes.     We discussed that I would strongly recommend treatment to reduce long-term cardiovascular risk factors, daytime symptoms, comorbid hypertension as " "well as to normalize nocturnal SpO2.    We discussed options including start of CPAP auto titrate 5-15 cm H2O with follow-up overnight oximetry on CPAP versus all-night Pap titration PSG.    We agreed to proceed with the start of CPAP auto titrate 5-15 cm H2O, he would like to be set up with [facility] through the [facility] location.  Orders placed today.  Plan for follow-up 4 to 6 weeks after starting CPAP and if AHI is less than 10 and clinically doing well, then we will plan for home overnight oximetry while using CPAP.    SUBJECTIVE:  Tawanda Ramos is a 33 year old male.    33 year old male who is referred for sleep disordered breathing.     Pertinent PMHx of MDD, anxiety, HTN, obesity.     STOP-BANG score of 6, with unknown neck circumference.  [place] score of 14.  ARTHUR: 21     BMI of Estimated body mass index is 42.51 kg/m  as calculated from the following:    Height as of 5/9/23: 1.742 m (5' 8.58\").    Weight as of an earlier encounter on 5/21/24: 129 kg (284 lb 6.4 oz).      Today -we reviewed his home sleep test results in detail.    Chief concern per questionnaire: \"Extreme day time tiredness \"     Duration of symptoms:  \"Within the last year \"     Goals for visit per questionnaire: \"    Remedy my sleep issues   \"     Sleep pattern:  Workdays.  10pm - 8am.  Weekends.  11pm - 11:30pm to 10am.  Time to fall asleep: ~variable minutes.  Awakenings: 1-3 times per night, few minutes to return to sleep.  Average total sleep time:  7-8 hours  Napping.  0 days per week, - hours per nap.     Yes for read, TV, phone in bed.     Yes for RLS screen.  Yes for sleep walking.  No for dream enactment behavior.  Yes for bruxism.     No for morning headaches.  Yes for snoring.  Yes for observed apnea.  Yes for FHx of ALEJANDRO.     Caffeine use:  No for 3+ per day.  No for within 6 hours of bed.    WatchPAT - HOME SLEEP STUDY INTERPRETATION     Patient: Tawanda Ramos  MRN: [id number]  Date of Birth: " "1990  Study Date: 7/31/2024  Referring Provider: Brooks Tena  Ordering Provider: Robby Obregon MD, MD     Chain of custody patient verification was not enabled.       Indications for Home Study: Tawanda Ramos is a 33 year old male with a history of MDD, anxiety, HTN, obesity  who presents with symptoms suggestive of obstructive sleep apnea.     Estimated body mass index is 42.51 kg/m  as calculated from the following:    Height as of 5/9/23: 1.742 m (5' 8.58\").    Weight as of 5/21/24: 129 kg (284 lb 6.4 oz).  Total score - [place]: 14 (6/8/2024  4:59 PM)  Total Score: 7 (6/8/2024  4:57 PM)     Data: A full night home sleep study was performed recording the standard physiologic parameters including peripheral arterial tonometry (PAT), sound/snoring, body position,  movement, sound, and oxygen saturation by pulse oximetry. Pulse rate was estimated by oximetry recording. Sleep staging (wake, REM, light, and deep sleep) was derived from PAT signal.  This study was considered adequate based on > 4 hours of quality oximetry and respiratory recording. As specified by the AASM Manual for the Scoring of Sleep and Associated events, version 2.3, Rule VIII.D 1B, 4% oxygen desaturation scoring for hypopneas is used as a standard of care on all home sleep apnea testing.     Total Recording Time: 7 hrs, 25 min  Total Sleep Time: 6 hrs, 50 min  % of Sleep Time REM: 11.2%     Respiratory:  Snoring: Snoring was present.  Respiratory events: The PAT respiratory disturbance index [pRDI] was 43.8 events per hour.  The PAT apnea/hypopnea index [pAHI] was 43.2 events per hour.  TYREE was 42.3 events per hour.  During REM sleep the pAHI was 35.7.  Sleep Associated Hypoxemia: sustained hypoxemia was present, predominantly in first half of recording. Mean oxygen saturation was 89%.  Minimum was 74%.  Time with saturation less than 88% was 146.3 minutes.     Heart Rate: By pulse oximetry normal rate was noted.  " Per cardiac rhythm analysis, 2 minutes labeled as atrial fibrillation.     Position: Percent of time spent: supine - 91.8%, prone - 0.2%, on right - 0%, on left - 7.9%.  pAHI was 46 per hour supine, - per hour prone, - per hour on right side, and 13 per hour on left side.      Assessment:   Severe obstructive sleep apnea.  Sleep associated hypoxemia was present.     Recommendations:  Consider auto-CPAP at 5-15 cmH2O or polysomnography with full night PAP titration.  Suggest optimizing sleep hygiene and avoiding sleep deprivation.  Weight management.     Diagnosis Code(s): Obstructive Sleep Apnea G47.33, Hypoxemia G47.36     Robby Obregon MD, MD, August 6, 2024   Diplomate, American Board of Family Medicine, Sleep Medicine    Past medical history:    Patient Active Problem List    Diagnosis Date Noted    Recurrent major depressive disorder, in full remission (H24) 01/09/2023     Priority: Medium    SHIVANI (generalized anxiety disorder) 03/10/2020     Priority: Medium     Patient is followed by JEN GONZALEZ for ongoing prescription of benzodiazepines.  All refills should be approved by this provider, or covering partner.    Medication(s): alprazolam.   Maximum quantity per month: 60  Clinic visit frequency required: Q 6  months     Controlled substance agreement on file: Yes       Date(s): 03/10/2020  Benzodiazepine use reviewed by psychiatry:  Yes       Date(s): 03/10/2020    Last [place] website verification:  done on 03/10/2020  [email or web address]          Obesity (BMI 35.0-39.9) with comorbidity (H) 06/13/2019     Priority: Medium    Hypertension goal BP (blood pressure) < 140/90 08/12/2014     Priority: Medium    Major depression in complete remission (H) 08/04/2014     Priority: Medium    CARDIOVASCULAR SCREENING; LDL GOAL LESS THAN 160 10/31/2010     Priority: Medium       10 point ROS of systems including Constitutional, Eyes, Respiratory, Cardiovascular, Gastroenterology,  Genitourinary, Integumentary, Muscularskeletal, Psychiatric were all negative except for pertinent positives noted in my HPI.    Current Outpatient Medications   Medication Sig Dispense Refill    ALPRAZolam (XANAX) 0.25 MG tablet Take 1 tablet (0.25 mg) by mouth 3 times daily as needed for anxiety 60 tablet 1    amLODIPine (NORVASC) 5 MG tablet Take 2 tablets (10 mg) by mouth daily      atorvastatin (LIPITOR) 20 MG tablet Take 1 tablet (20 mg) by mouth daily 30 tablet 3    lisinopril (ZESTRIL) 30 MG tablet Take 1 tablet (30 mg) by mouth daily 90 tablet 2       OBJECTIVE:  There were no vitals taken for this visit.    Physical Exam     ---  This note was written with the assistance of the Dragon voice-dictation technology software. The final document, although reviewed, may contain errors. For corrections, please contact the office.    Total time spent preparing to see the patient, review of chart, obtaining history and physical examination, review of sleep testing, review of treatment options, education, discussion with patient and documenting in Epic / EMR was 25 minutes.  All time involved was spent on the day of service for the patient (the same day as the patient's appointment).    Robby Obregon MD    Sleep Medicine  [place], [place]  Main Office: [phone]  [facility] [place], [place]  [address], [address]  Schedule visits: [phone]  Main Office: [phone]  Fax: [phone]     [Follow-Up] : a follow-up visit [FreeTextEntry1] :  DMN w. Proteinuria

## 2024-08-13 ENCOUNTER — VIRTUAL VISIT (OUTPATIENT)
Dept: PULMONOLOGY | Facility: OTHER | Age: 34
End: 2024-08-13
Attending: FAMILY MEDICINE
Payer: COMMERCIAL

## 2024-08-13 VITALS
WEIGHT: 265 LBS | HEIGHT: 69 IN | BODY MASS INDEX: 39.25 KG/M2 | DIASTOLIC BLOOD PRESSURE: 70 MMHG | SYSTOLIC BLOOD PRESSURE: 120 MMHG

## 2024-08-13 DIAGNOSIS — R09.02 HYPOXEMIA: ICD-10-CM

## 2024-08-13 DIAGNOSIS — G47.33 OSA (OBSTRUCTIVE SLEEP APNEA): Primary | ICD-10-CM

## 2024-08-13 PROCEDURE — 99213 OFFICE O/P EST LOW 20 MIN: CPT | Mod: 95 | Performed by: FAMILY MEDICINE

## 2024-08-13 ASSESSMENT — PAIN SCALES - GENERAL: PAINLEVEL: NO PAIN (0)

## 2024-08-13 NOTE — NURSING NOTE
Current patient location: [address] [place] [address]    Is the patient currently in the state of [place]? YES    Visit mode:VIDEO    If the visit is dropped, the patient can be reconnected by: VIDEO VISIT: Send to e-mail at: [email or web address]    Will anyone else be joining the visit? NO  (If patient encounters technical issues they should call [phone])    How would you like to obtain your AVS? MyChart    Are changes needed to the allergy or medication list? No    Are refills needed on medications prescribed by this physician? NO    Rooming Documentation:  Assigned questionnaire(s) completed      Reason for visit: RECHECK    Erinn VIERA

## 2024-08-19 ENCOUNTER — DOCUMENTATION ONLY (OUTPATIENT)
Dept: HOME HEALTH SERVICES | Facility: CLINIC | Age: 34
End: 2024-08-19

## 2024-08-19 NOTE — PROGRESS NOTES
[facility] was [facility]  Patient Tawanda Ramos was set up at [facility] on August 19, 2024. Patient received a Resmed Airsense 11 Pressures were set at  5-15 cm H2O.   Patient s ramp is  Off and FLEX/EPR is EPR 2.  Patient received a Resmed Mask name: Airfit [id number]  Nasal mask size Medium, heated tubing and heated humidifier.  Patient has the following compliance requirements: using and visit requirements  Patient has a follow up on 10/15/2024 with Dr. Obregon.    Jacob Beltran

## 2024-08-27 ENCOUNTER — MYC REFILL (OUTPATIENT)
Dept: INTERNAL MEDICINE | Facility: OTHER | Age: 34
End: 2024-08-27

## 2024-08-27 ENCOUNTER — MYC REFILL (OUTPATIENT)
Dept: FAMILY MEDICINE | Facility: CLINIC | Age: 34
End: 2024-08-27

## 2024-08-27 DIAGNOSIS — I10 HYPERTENSION GOAL BP (BLOOD PRESSURE) < 140/90: ICD-10-CM

## 2024-08-27 DIAGNOSIS — F41.1 GAD (GENERALIZED ANXIETY DISORDER): ICD-10-CM

## 2024-08-27 RX ORDER — LISINOPRIL 30 MG/1
30 TABLET ORAL DAILY
Qty: 90 TABLET | Refills: 0 | Status: SHIPPED | OUTPATIENT
Start: 2024-08-27 | End: 2024-08-28

## 2024-08-27 NOTE — TELEPHONE ENCOUNTER
Destini refill sent. Needs appointment, note sent to pharmacy to inform patient per protocol.    Anibal GONZALEZ RN 8/27/2024 at 1:44 PM

## 2024-08-27 NOTE — TELEPHONE ENCOUNTER
Disp Refills Start End CARMEN   ALPRAZolam (XANAX) 0.25 MG tablet 60 tablet 1 7/17/2024 -- No      Disp Refills Start End CARMEN   amLODIPine (NORVASC) 5 MG tablet -- -- 5/21/2024 -- No     Last Office Visit: 05/21/2024  Future Office visit:       Routing refill request to provider for review/approval because:

## 2024-08-28 RX ORDER — AMLODIPINE BESYLATE 5 MG/1
10 TABLET ORAL DAILY
Qty: 90 TABLET | Refills: 2 | Status: SHIPPED | OUTPATIENT
Start: 2024-08-28

## 2024-08-28 RX ORDER — LISINOPRIL 30 MG/1
30 TABLET ORAL DAILY
Qty: 90 TABLET | Refills: 0 | Status: SHIPPED | OUTPATIENT
Start: 2024-08-28

## 2024-08-28 RX ORDER — ALPRAZOLAM 0.25 MG
0.25 TABLET ORAL 3 TIMES DAILY PRN
Qty: 60 TABLET | Refills: 1 | Status: SHIPPED | OUTPATIENT
Start: 2024-08-28

## 2024-08-28 NOTE — TELEPHONE ENCOUNTER
ALPRAZolam (XANAX) 0.25 MG tablet         Routing refill request to provider for review/approval because:  Drug not on the [facility], [facility] or [facility] refill protocol or controlled substance      amLODIPine (NORVASC) 5 MG tablet       Routing refill request to provider for review/approval because:  Medication is reported/historical

## 2024-10-05 DIAGNOSIS — F41.1 GAD (GENERALIZED ANXIETY DISORDER): ICD-10-CM

## 2024-10-08 RX ORDER — ALPRAZOLAM 0.25 MG/1
0.25 TABLET ORAL 3 TIMES DAILY PRN
Qty: 60 TABLET | Refills: 1 | Status: SHIPPED | OUTPATIENT
Start: 2024-10-08

## 2024-10-08 NOTE — TELEPHONE ENCOUNTER
ALPRAZOLAM 0.25MG TABLET       Last Written Prescription Date:  8/28/24  Last Fill Quantity: 60,   # refills: 1  Last Office Visit: 5/21/24  Future Office visit:       Routing refill request to provider for review/approval because:  Drug not on the [facility], [facility] or [facility] refill protocol or controlled substance

## 2024-10-13 DIAGNOSIS — E78.5 HYPERLIPIDEMIA LDL GOAL <130: ICD-10-CM

## 2024-10-14 RX ORDER — ATORVASTATIN CALCIUM 20 MG/1
20 TABLET, FILM COATED ORAL DAILY
Qty: 90 TABLET | Refills: 1 | Status: SHIPPED | OUTPATIENT
Start: 2024-10-14

## 2024-10-14 NOTE — PROGRESS NOTES
"Tawanda Ramos is a 33 year old male who is being evaluated via a billable video visit.       The patient has been notified of following:      \"This video visit will be conducted via a call between you and your physician/provider. We have found that certain health care needs can be provided without the need for an in-person physical exam.  This service lets us provide the care you need with a video conversation.  If a prescription is necessary we can send it directly to your pharmacy.  If lab work is needed we can place an order for that and you can then stop by our lab to have the test done at a later time.     Video visits are billed at different rates depending on your insurance coverage.  Please reach out to your insurance provider with any questions.     If during the course of the call the physician/provider feels a video visit is not appropriate, you will not be charged for this service.\"     Patient has given verbal consent for Video visit? Yes  How would you like to obtain your AVS? Mail a copy  If you are dropped from the video visit, the video invite should be resent to: Text to cell phone: -  Will anyone else be joining your video visit? No  If patient encounters technical issues they should call [phone]      Video-Visit Details     Type of service:  Video Visit     Start Time:  1pm  End Time:  1:15pm    Originating Location (pt. Location): Home     Distant Location (provider location):  Off-site, [facility]       Platform used for Video Visit: [employer]    Virtual visit for CPAP compliance follow-up.     A/P:     1.)  Severe ALEJANDRO (pAHI 43.2) with sustained hypoxemia predominantly in first half of recording. Mean oxygen saturation was 89%.  Minimum was 74%.  Time with saturation less than 88% was 146.3 minutes.      We discussed that I would strongly recommend treatment to reduce long-term cardiovascular risk factors, daytime symptoms, comorbid hypertension as well as to " "normalize nocturnal SpO2.     We discussed options including start of CPAP auto titrate 5-15 cm H2O with follow-up overnight oximetry on CPAP versus all-night Pap titration PSG.     Appears well controlled with borderline adequate compliance on CPAP auto-titrate 5-15 cm H2O.      Our plan for today:  We will increase his minimum EPAP from 5 up to 8 given a feeling of inadequate pressure, so new settings are auto titrate 8-15 cm H2O.  Plan for follow-up in 1-2 months, if overall doing well, then I would recommend home overnight oximetry on CPAP.    The longitudinal plan of care for the diagnosis(es)/condition(s) as documented were addressed during this visit. Due to the added complexity in care, I will continue to support Tawanda in the subsequent management and with ongoing continuity of care.      SUBJECTIVE:  Tawanda Ramos is a 33 year old male.    Pertinent PMHx of MDD, anxiety, HTN, obesity.     Prior Sleep Testin2024 - WatchPAT with weight 284 lbs, BMI 42.5.  pAHI 43.2.  sustained hypoxemia was present, predominantly in first half of recording. Mean oxygen saturation was 89%. Minimum was 74%. Time with saturation less than 88% was 146.3 minutes.     2024 -we reviewed his home sleep test results in detail.     Chief concern per questionnaire: \"Extreme day time tiredness \"     Duration of symptoms:  \"Within the last year \"     Goals for visit per questionnaire: \"    Remedy my sleep issues   \"     Sleep pattern:  Workdays.  10pm - 8am.  Weekends.  11pm - 11:30pm to 10am.  Time to fall asleep: ~variable minutes.  Awakenings: 1-3 times per night, few minutes to return to sleep.  Average total sleep time:  7-8 hours  Napping.  0 days per week, - hours per nap.     Yes for read, TV, phone in bed.     Yes for RLS screen.  Yes for sleep walking.  No for dream enactment behavior.  Yes for bruxism.     No for morning headaches.  Yes for snoring.  Yes for observed apnea.  Yes for FHx of ALEJANDRO.     Caffeine " use:  No for 3+ per day.  No for within 6 hours of bed.    A/P to start CPAP auto 5-15 with follow-up JESSICA on CPAP.    Today -overall, he has noted definite daytime benefit since starting CPAP.  This has been a significant reduction in daytime fatigue.  He feels he still can have some in the after lunch timeframe, but is better.  His other frustration has been some difficulty initiating sleep with his CPAP on, taking up to 60 minutes.  A significant part of this seems to be a sensation of an adequate pressure from the CPAP.  The other has been some frustration with having a front of the mask hose attachment.    CPAP download reviewed over past 30 days on auto-titrate 5-15 cm H2O.  Average daily usage 258 minutes.  AHI 0.7.    Past medical history:    Patient Active Problem List    Diagnosis Date Noted    ALEJANDRO (obstructive sleep apnea) 08/13/2024     Priority: Medium    Recurrent major depressive disorder, in full remission (H) 01/09/2023     Priority: Medium    SHIVANI (generalized anxiety disorder) 03/10/2020     Priority: Medium     Patient is followed by JEN GONZALEZ for ongoing prescription of benzodiazepines.  All refills should be approved by this provider, or covering partner.    Medication(s): alprazolam.   Maximum quantity per month: 60  Clinic visit frequency required: Q 6  months     Controlled substance agreement on file: Yes       Date(s): 03/10/2020  Benzodiazepine use reviewed by psychiatry:  Yes       Date(s): 03/10/2020    Last [place]MP website verification:  done on 03/10/2020  [email or web address]          Obesity (BMI 35.0-39.9) with comorbidity (H) 06/13/2019     Priority: Medium    Hypertension goal BP (blood pressure) < 140/90 08/12/2014     Priority: Medium    Major depression in complete remission (H) 08/04/2014     Priority: Medium    CARDIOVASCULAR SCREENING; LDL GOAL LESS THAN 160 10/31/2010     Priority: Medium       10 point ROS of systems including Constitutional,  Eyes, Respiratory, Cardiovascular, Gastroenterology, Genitourinary, Integumentary, Muscularskeletal, Psychiatric were all negative except for pertinent positives noted in my HPI.    Current Outpatient Medications   Medication Sig Dispense Refill    ALPRAZolam (XANAX) 0.25 MG tablet TAKE 1 TABLET (0.25 MG) BY MOUTH 3 TIMES DAILY AS NEEDED FOR ANXIETY. 60 tablet 1    amLODIPine (NORVASC) 5 MG tablet Take 2 tablets (10 mg) by mouth daily. 90 tablet 2    atorvastatin (LIPITOR) 20 MG tablet TAKE 1 TABLET BY MOUTH EVERY DAY 90 tablet 1    lisinopril (ZESTRIL) 30 MG tablet Take 1 tablet (30 mg) by mouth daily. 90 tablet 0       OBJECTIVE:  There were no vitals taken for this visit.    Physical Exam     ---  This note was written with the assistance of the Dragon voice-dictation technology software. The final document, although reviewed, may contain errors. For corrections, please contact the office.    Total time spent preparing to see the patient, review of chart, obtaining history and physical examination, review of sleep testing, review of treatment options, education, discussion with patient and documenting in Epic / EMR was 20 minutes.  All time involved was spent on the day of service for the patient (the same day as the patient's appointment).    Robby Obregon MD    Sleep Medicine  [facility]  - [place], [place]  Main Office: [phone]  [facility] [place], [place]  [address], [address]  Schedule visits: [phone]  Main Office: [phone]  Fax: [phone]

## 2024-10-15 ENCOUNTER — VIRTUAL VISIT (OUTPATIENT)
Dept: PULMONOLOGY | Facility: OTHER | Age: 34
End: 2024-10-15
Attending: FAMILY MEDICINE
Payer: COMMERCIAL

## 2024-10-15 VITALS — HEIGHT: 69 IN | WEIGHT: 275 LBS | BODY MASS INDEX: 40.73 KG/M2

## 2024-10-15 DIAGNOSIS — R09.02 HYPOXEMIA: ICD-10-CM

## 2024-10-15 DIAGNOSIS — G47.33 OSA (OBSTRUCTIVE SLEEP APNEA): Primary | ICD-10-CM

## 2024-10-15 PROCEDURE — 99213 OFFICE O/P EST LOW 20 MIN: CPT | Mod: 95 | Performed by: FAMILY MEDICINE

## 2024-10-15 PROCEDURE — G2211 COMPLEX E/M VISIT ADD ON: HCPCS | Mod: 95 | Performed by: FAMILY MEDICINE

## 2024-10-15 ASSESSMENT — PAIN SCALES - GENERAL: PAINLEVEL: NO PAIN (0)

## 2024-10-15 NOTE — PROGRESS NOTES
CPAP Compliance Visit:       Name: Tawanda Ramos MRN# [id number]   Age: 33 year old YOB: 1990     Date of Consultation: October 15, 2024  Primary care provider: Brooks Tena    Compliance:   63 % of days with >4 hours of use.   5days/30 with no use   Average use: 4hours 59minutes per day   95%ile leak 20.5 L/min   CPAP 95% pressure 10.3 cm   AHI 0.7 events/hour   ELVI 0  PB 0%     Impression:   Patient is {CPAP Compliance :338913}

## 2024-10-15 NOTE — PROGRESS NOTES
"Virtual Visit Details    Type of service:  Video Visit     Originating Location (pt. Location): {video visit patient location:845053::\"Home\"}  {PROVIDER LOCATION On-site should be selected for visits conducted from your clinic location or adjoining [facility] hospital, academic office, or other nearby [facility] building. Off-site should be selected for all other provider locations, including home:[id number]}  Distant Location (provider location):  {virtual location provider:[id number]}  Platform used for Video Visit: {Virtual Visit Platforms:435064::\"[employer]\"}    "

## 2024-10-15 NOTE — NURSING NOTE
Current patient location: [address] [place] [id number]    Is the patient currently in the state of [place]? YES    Visit mode:VIDEO    If the visit is dropped, the patient can be reconnected by: VIDEO VISIT: Send to e-mail at: [email or web address]    Will anyone else be joining the visit? NO  (If patient encounters technical issues they should call [phone] :[phone])    Are changes needed to the allergy or medication list? No    Are refills needed on medications prescribed by this physician? NO    Rooming Documentation:  Questionnaire(s) completed    Reason for visit: RECHDIGNA CHARLESF

## 2024-11-18 DIAGNOSIS — F41.1 GAD (GENERALIZED ANXIETY DISORDER): ICD-10-CM

## 2024-11-18 NOTE — TELEPHONE ENCOUNTER
ALPRAZOLAM 0.25MG TABLET         Last Written Prescription Date:  10/8/24  Last Fill Quantity: 60,   # refills: 1  Last Office Visit: 5/21/24  Future Office visit:    Next 5 appointments (look out 90 days)      Nov 27, 2024 11:00 AM  (Arrive by 10:45 AM)  Adult Preventative Visit with Brooks Tena DO  [facility] ([facility] ) [address]  [facility] [id number] [phone]             Routing refill request to provider for review/approval because:  Drug not on the [facility], [facility] or  Health refill protocol or controlled substance

## 2024-11-20 RX ORDER — ALPRAZOLAM 0.25 MG/1
0.25 TABLET ORAL 3 TIMES DAILY PRN
Qty: 60 TABLET | Refills: 1 | Status: SHIPPED | OUTPATIENT
Start: 2024-11-20

## 2024-12-09 NOTE — PROGRESS NOTES
{PROVIDER CHARTING PREFERENCE:318713}    David Neff is a 34 year old, presenting for the following health issues:  No chief complaint on file.  {(!) Visit Details have not yet been documented.  Please enter Visit Details and then use this list to pull in documentation. (Optional):368441}  HPI       Hypertension Follow-up    Do you check your blood pressure regularly outside of the clinic? { :109673}   Are you following a low salt diet? { :739854}  Are your blood pressures ever more than 140 on the top number (systolic) OR more   than 90 on the bottom number (diastolic), for example 140/90? { :426933}    Anxiety   How are you doing with your anxiety since your last visit? { :496614}  Are you having other symptoms that might be associated with anxiety? { :781139}  Have you had a significant life event? { :611459}   Are you feeling depressed? { :988108}  Do you have any concerns with your use of alcohol or other drugs? { :204283}    Social History     Tobacco Use    Smoking status: Never    Smokeless tobacco: Never   Vaping Use    Vaping status: Never Used   Substance Use Topics    Alcohol use: Yes     Alcohol/week: 10.0 standard drinks of alcohol     Types: 10 Standard drinks or equivalent per week    Drug use: Not Currently         3/10/2020    10:39 AM 12/14/2020     9:34 AM 10/3/2022     8:36 AM   SHIVANI-7 SCORE   Total Score 4 2 2         10/3/2022     8:27 AM 5/9/2023     7:37 AM 5/21/2024     8:36 AM   PHQ   PHQ-9 Total Score 0 2 4   Q9: Thoughts of better off dead/self-harm past 2 weeks Not at all  Not at all  Not at all        Patient-reported     {Last PHQ9 or GAD7 Responses (Optional):620583}        Discuss transition to new PCP    {additonal problems for provider to add (Optional):729460}    {ROS Picklists (Optional):720905}      Objective    There were no vitals taken for this visit.  There is no height or weight on file to calculate BMI.  Physical Exam   {Exam List  (Optional):216051}    {Diagnostic Test Results (Optional):147628}        Signed Electronically by: Brooks Tena DO  {Email feedback regarding this note to [email or web address]   :385549}

## 2024-12-11 ENCOUNTER — OFFICE VISIT (OUTPATIENT)
Dept: INTERNAL MEDICINE | Facility: OTHER | Age: 34
End: 2024-12-11
Attending: INTERNAL MEDICINE
Payer: COMMERCIAL

## 2024-12-11 VITALS
WEIGHT: 279 LBS | RESPIRATION RATE: 18 BRPM | HEART RATE: 92 BPM | TEMPERATURE: 98.7 F | SYSTOLIC BLOOD PRESSURE: 146 MMHG | OXYGEN SATURATION: 95 % | DIASTOLIC BLOOD PRESSURE: 86 MMHG | BODY MASS INDEX: 41.2 KG/M2

## 2024-12-11 DIAGNOSIS — R68.82 DECREASED LIBIDO: ICD-10-CM

## 2024-12-11 DIAGNOSIS — E78.5 HYPERLIPIDEMIA LDL GOAL <130: ICD-10-CM

## 2024-12-11 DIAGNOSIS — I10 PRIMARY HYPERTENSION: Primary | ICD-10-CM

## 2024-12-11 DIAGNOSIS — Z00.00 ROUTINE HISTORY AND PHYSICAL EXAMINATION OF ADULT: ICD-10-CM

## 2024-12-11 DIAGNOSIS — D69.6 THROMBOCYTOPENIA (H): ICD-10-CM

## 2024-12-11 LAB
ALBUMIN SERPL BCG-MCNC: 4.8 G/DL (ref 3.5–5.2)
ALP SERPL-CCNC: 56 U/L (ref 40–150)
ALT SERPL W P-5'-P-CCNC: 47 U/L (ref 0–70)
ANION GAP SERPL CALCULATED.3IONS-SCNC: 16 MMOL/L (ref 7–15)
AST SERPL W P-5'-P-CCNC: 34 U/L (ref 0–45)
BASOPHILS # BLD AUTO: 0 10E3/UL (ref 0–0.2)
BASOPHILS NFR BLD AUTO: 0 %
BILIRUB SERPL-MCNC: 0.5 MG/DL
BUN SERPL-MCNC: 9.1 MG/DL (ref 6–20)
CALCIUM SERPL-MCNC: 9.5 MG/DL (ref 8.8–10.4)
CHLORIDE SERPL-SCNC: 96 MMOL/L (ref 98–107)
CHOLEST SERPL-MCNC: 294 MG/DL
CREAT SERPL-MCNC: 1.09 MG/DL (ref 0.67–1.17)
EGFRCR SERPLBLD CKD-EPI 2021: >90 ML/MIN/1.73M2
EOSINOPHIL # BLD AUTO: 0.1 10E3/UL (ref 0–0.7)
EOSINOPHIL NFR BLD AUTO: 3 %
ERYTHROCYTE [DISTWIDTH] IN BLOOD BY AUTOMATED COUNT: 12.8 % (ref 10–15)
FASTING STATUS PATIENT QL REPORTED: YES
FASTING STATUS PATIENT QL REPORTED: YES
GLUCOSE SERPL-MCNC: 132 MG/DL (ref 70–99)
HCO3 SERPL-SCNC: 22 MMOL/L (ref 22–29)
HCT VFR BLD AUTO: 42.2 % (ref 40–53)
HDLC SERPL-MCNC: 56 MG/DL
HGB BLD-MCNC: 14.8 G/DL (ref 13.3–17.7)
IMM GRANULOCYTES # BLD: 0 10E3/UL
IMM GRANULOCYTES NFR BLD: 1 %
LDLC SERPL CALC-MCNC: 167 MG/DL
LYMPHOCYTES # BLD AUTO: 0.4 10E3/UL (ref 0.8–5.3)
LYMPHOCYTES NFR BLD AUTO: 7 %
MCH RBC QN AUTO: 33.4 PG (ref 26.5–33)
MCHC RBC AUTO-ENTMCNC: 35.1 G/DL (ref 31.5–36.5)
MCV RBC AUTO: 95 FL (ref 78–100)
MONOCYTES # BLD AUTO: 0.4 10E3/UL (ref 0–1.3)
MONOCYTES NFR BLD AUTO: 9 %
NEUTROPHILS # BLD AUTO: 4.1 10E3/UL (ref 1.6–8.3)
NEUTROPHILS NFR BLD AUTO: 80 %
NONHDLC SERPL-MCNC: 238 MG/DL
PLATELET # BLD AUTO: 126 10E3/UL (ref 150–450)
POTASSIUM SERPL-SCNC: 4.9 MMOL/L (ref 3.4–5.3)
PROT SERPL-MCNC: 7.3 G/DL (ref 6.4–8.3)
RBC # BLD AUTO: 4.43 10E6/UL (ref 4.4–5.9)
SODIUM SERPL-SCNC: 134 MMOL/L (ref 135–145)
TRIGL SERPL-MCNC: 354 MG/DL
WBC # BLD AUTO: 5.1 10E3/UL (ref 4–11)

## 2024-12-11 RX ORDER — ATORVASTATIN CALCIUM 20 MG/1
20 TABLET, FILM COATED ORAL DAILY
Qty: 90 TABLET | Refills: 1 | Status: SHIPPED | OUTPATIENT
Start: 2024-12-11

## 2024-12-11 RX ORDER — AMLODIPINE BESYLATE 10 MG/1
10 TABLET ORAL DAILY
Qty: 90 TABLET | Refills: 1 | Status: SHIPPED | OUTPATIENT
Start: 2024-12-11

## 2024-12-11 SDOH — HEALTH STABILITY: PHYSICAL HEALTH: ON AVERAGE, HOW MANY DAYS PER WEEK DO YOU ENGAGE IN MODERATE TO STRENUOUS EXERCISE (LIKE A BRISK WALK)?: 2 DAYS

## 2024-12-11 SDOH — HEALTH STABILITY: PHYSICAL HEALTH: ON AVERAGE, HOW MANY MINUTES DO YOU ENGAGE IN EXERCISE AT THIS LEVEL?: 30 MIN

## 2024-12-11 ASSESSMENT — ANXIETY QUESTIONNAIRES
2. NOT BEING ABLE TO STOP OR CONTROL WORRYING: SEVERAL DAYS
8. IF YOU CHECKED OFF ANY PROBLEMS, HOW DIFFICULT HAVE THESE MADE IT FOR YOU TO DO YOUR WORK, TAKE CARE OF THINGS AT HOME, OR GET ALONG WITH OTHER PEOPLE?: SOMEWHAT DIFFICULT
7. FEELING AFRAID AS IF SOMETHING AWFUL MIGHT HAPPEN: NOT AT ALL
GAD7 TOTAL SCORE: 5
7. FEELING AFRAID AS IF SOMETHING AWFUL MIGHT HAPPEN: NOT AT ALL
5. BEING SO RESTLESS THAT IT IS HARD TO SIT STILL: SEVERAL DAYS
6. BECOMING EASILY ANNOYED OR IRRITABLE: NOT AT ALL
1. FEELING NERVOUS, ANXIOUS, OR ON EDGE: SEVERAL DAYS
GAD7 TOTAL SCORE: 5
3. WORRYING TOO MUCH ABOUT DIFFERENT THINGS: SEVERAL DAYS
GAD7 TOTAL SCORE: 5
4. TROUBLE RELAXING: SEVERAL DAYS
IF YOU CHECKED OFF ANY PROBLEMS ON THIS QUESTIONNAIRE, HOW DIFFICULT HAVE THESE PROBLEMS MADE IT FOR YOU TO DO YOUR WORK, TAKE CARE OF THINGS AT HOME, OR GET ALONG WITH OTHER PEOPLE: SOMEWHAT DIFFICULT

## 2024-12-11 ASSESSMENT — PATIENT HEALTH QUESTIONNAIRE - PHQ9
SUM OF ALL RESPONSES TO PHQ QUESTIONS 1-9: 8
SUM OF ALL RESPONSES TO PHQ QUESTIONS 1-9: 8
10. IF YOU CHECKED OFF ANY PROBLEMS, HOW DIFFICULT HAVE THESE PROBLEMS MADE IT FOR YOU TO DO YOUR WORK, TAKE CARE OF THINGS AT HOME, OR GET ALONG WITH OTHER PEOPLE: SOMEWHAT DIFFICULT

## 2024-12-11 ASSESSMENT — SOCIAL DETERMINANTS OF HEALTH (SDOH): HOW OFTEN DO YOU GET TOGETHER WITH FRIENDS OR RELATIVES?: MORE THAN THREE TIMES A WEEK

## 2024-12-11 ASSESSMENT — PAIN SCALES - GENERAL: PAINLEVEL_OUTOF10: NO PAIN (0)

## 2024-12-11 NOTE — PROGRESS NOTES
"Preventive Care Visit  [facility]  Brooks DASILVAThaddeus Tena DO, Internal Medicine  Dec 11, 2024      Assessment & Plan   Problem List Items Addressed This Visit    None  Visit Diagnoses       Primary hypertension    -  Primary    Relevant Medications    amLODIPine (NORVASC) 10 MG tablet    Hyperlipidemia LDL goal <130        Relevant Medications    atorvastatin (LIPITOR) 20 MG tablet    Other Relevant Orders    Lipid Profile (Chol, Trig, HDL, LDL calc)    Thrombocytopenia (H)        Relevant Orders    CBC with platelets and differential    Decreased libido        Relevant Orders    Testosterone Free and Total    Routine history and physical examination of adult        Relevant Orders    Comprehensive metabolic panel (BMP + Alb, Alk Phos, ALT, AST, Total. Bili, TP)             Patient has been advised of split billing requirements and indicates understanding: Yes      Follow-up blood pressure check in approximately 2 weeks.  In addition patient did make an establishment of care appointment today for a another provider as I am transitioning out of clinic.  Lastly we will reassess his thrombocytopenia and if remains present consideration for further workup and/or referral.     BMI  Estimated body mass index is 41.2 kg/m  as calculated from the following:    Height as of 10/15/24: 1.753 m (5' 9\").    Weight as of this encounter: 126.6 kg (279 lb).       Counseling  Appropriate preventive services were addressed with this patient via screening, questionnaire, or discussion as appropriate for fall prevention, nutrition, physical activity, Tobacco-use cessation, social engagement, weight loss and cognition.  Checklist reviewing preventive services available has been given to the patient.  Reviewed patient's diet, addressing concerns and/or questions.   He is at risk for lack of exercise and has been provided with information to increase physical activity for the benefit of his well-being.   He is at risk for psychosocial " distress and has been provided with information to reduce risk.   The patient reports drinking more than 3 alcoholic drinks per day and/or more than 7 drhnks per week. The patient was counseled and given information about possible harmful effects of excessive alcohol intake.The patient's PHQ-9 score is consistent with mild depression. He was provided with information regarding depression.           David Neff is a 34 year old, presenting for the following:  Physical           HPI    Tawanda presents today for routine follow-up.  He does have a history of hypertension and hyperlipidemia.  His blood pressure is slightly elevated today however at 1 point we did increase his amlodipine from 5 mg to 10 mg however he was not taking 2 of the 5's and was only taking one of the fives.  Today we did discuss just changing his overall prescription to 10 mg p.o. daily.  He also admits that he has not been using his statin medication and sometime at least and/or intermittently.  Does receive Xanax however states that his pharmacy tends to call for the Xanax at every 20-day interval which he typically does not use.  Addition he reports starting on some sort of supplement however he later found out that it was possibly a derivative of opium.  He then had a televisit and states that he was started on Suboxone to get off of this supplement however he only took 4 doses of Suboxone.  Nevertheless at this time he is on neither of these medications.  Patient questions whether or not his testosterone is normal due to ongoing difficulty with obesity decreased energy levels and libido.  He would like testosterone checked today.  In review of the chart the patient appears to have underlying thrombocytopenia.  This appears to be somewhat chronic for him over the last couple years.  Appears as though he was seen in urgent care this summer at which time his thrombocytopenia was once again noted.      BP Readings from Last 3 Encounters:    12/11/24 (!) 146/86   08/13/24 120/70   05/21/24 (!) 166/80     Patient reports taking Norvasc 5 mg- 1 tablet daily, forgot he was suppose to be taking 2 tablets daily as prescribed.       Health Care Directive  Patient does not have a Health Care Directive: Discussed advance care planning with patient; however, patient declined at this time.      12/11/2024   General Health   How would you rate your overall physical health? (!) POOR   Feel stress (tense, anxious, or unable to sleep) To some extent      (!) STRESS CONCERN      12/11/2024   Nutrition   Three or more servings of calcium each day? (!) I DON'T KNOW   Diet: Regular (no restrictions)   How many servings of fruit and vegetables per day? (!) I DON'T KNOW   How many sweetened beverages each day? (!) I DON'T KNOW            12/11/2024   Exercise   Days per week of moderate/strenous exercise 2 days   Average minutes spent exercising at this level 30 min      (!) EXERCISE CONCERN      12/11/2024   Social Factors   Frequency of gathering with friends or relatives More than three times a week   Worry food won't last until get money to buy more No   Food not last or not have enough money for food? No   Do you have housing? (Housing is defined as stable permanent housing and does not include staying ouside in a car, in a tent, in an abandoned building, in an overnight shelter, or couch-surfing.) Yes   Are you worried about losing your housing? No   Lack of transportation? No   Unable to get utilities (heat,electricity)? No            12/11/2024   Dental   Dentist two times every year? Yes            12/11/2024   TB Screening   Were you born outside of the [place]? No          Today's PHQ-9 Score:       12/11/2024     8:20 AM   PHQ-9 SCORE   PHQ-9 Total Score MyChart 8 (Mild depression)   PHQ-9 Total Score 8        Patient-reported         12/11/2024   Substance Use   Alcohol more than 3/day or more than 7/wk Yes   How often do you have a drink containing alcohol 2 to  3 times a week   How many alcohol drinks on typical day 3 or 4   How often do you have 5+ drinks at one occasion Weekly   Audit 2/3 Score 4   How often not able to stop drinking once started Never   How often failed to do what normally expected Never   How often needed first drink in am after a heavy drinking session Never   How often feeling of guilt or remorse after drinking Never   How often unable to remember what happened the night before Never   Have you or someone else been injured because of your drinking No   Has anyone been concerned or suggested you cut down on drinking No   TOTAL SCORE - AUDIT 7   Do you use any other substances recreationally? (!) CANNABIS PRODUCTS        Social History     Tobacco Use    Smoking status: Never    Smokeless tobacco: Never   Vaping Use    Vaping status: Never Used   Substance Use Topics    Alcohol use: Yes     Alcohol/week: 10.0 standard drinks of alcohol     Types: 10 Standard drinks or equivalent per week    Drug use: Not Currently           12/11/2024   STI Screening   New sexual partner(s) since last STI/HIV test? No            12/11/2024   Contraception/Family Planning   Questions about contraception or family planning No           Reviewed and updated as needed this visit by Provider                    Past Medical History:   Diagnosis Date    Anxiety     Fracture of metacarpal of right hand, closed 03/22/2016    Infection due to 2019 novel coronavirus 04/2021     Past Surgical History:   Procedure Laterality Date    ENT SURGERY  2008    wisdom teeth extractions     Lab work is in process  Labs reviewed in EPIC  BP Readings from Last 3 Encounters:   12/11/24 (!) 146/86   08/13/24 120/70   05/21/24 (!) 166/80    Wt Readings from Last 3 Encounters:   12/11/24 126.6 kg (279 lb)   10/15/24 124.7 kg (275 lb)   08/13/24 120.2 kg (265 lb)                  Patient Active Problem List   Diagnosis    CARDIOVASCULAR SCREENING; LDL GOAL LESS THAN 160    Major depression in  complete remission (H)    Hypertension goal BP (blood pressure) < 140/90    Obesity (BMI 35.0-39.9) with comorbidity (H)    SHIVANI (generalized anxiety disorder)    Recurrent major depressive disorder, in full remission (H)    ALEJANDRO (obstructive sleep apnea)     Past Surgical History:   Procedure Laterality Date    ENT SURGERY  2008    wisdom teeth extractions       Social History     Tobacco Use    Smoking status: Never    Smokeless tobacco: Never   Substance Use Topics    Alcohol use: Yes     Alcohol/week: 10.0 standard drinks of alcohol     Types: 10 Standard drinks or equivalent per week     Family History   Problem Relation Age of Onset    Asthma Father     Hypertension Father     Anxiety Disorder Father     Hypertension Brother     Anxiety Disorder Brother     Anxiety Disorder Brother     Cerebrovascular Disease Maternal Grandmother     Arthritis Maternal Grandmother     Prostate Cancer Maternal Grandfather     Arthritis Maternal Grandfather     Cancer Maternal Grandfather     Diabetes Paternal Grandmother     Cerebrovascular Disease Paternal Grandfather     Cancer Paternal Grandfather          Current Outpatient Medications   Medication Sig Dispense Refill    ALPRAZolam (XANAX) 0.25 MG tablet TAKE 1 TABLET (0.25 MG) BY MOUTH 3 TIMES DAILY AS NEEDED FOR ANXIETY. 60 tablet 1    amLODIPine (NORVASC) 10 MG tablet Take 1 tablet (10 mg) by mouth daily. 90 tablet 1    atorvastatin (LIPITOR) 20 MG tablet Take 1 tablet (20 mg) by mouth daily. 90 tablet 1    lisinopril (ZESTRIL) 30 MG tablet Take 1 tablet (30 mg) by mouth daily. 90 tablet 0     Allergies   Allergen Reactions    Morphine And Codeine          Review of Systems  Constitutional, HEENT, cardiovascular, pulmonary, gi and gu systems are negative, except as otherwise noted.     Objective    Exam  BP (!) 146/86 (BP Location: Right arm, Patient Position: Sitting, Cuff Size: Adult Large)   Pulse 92   Temp 98.7  F (37.1  C) (Tympanic)   Resp 18   Wt 126.6 kg (279  "lb)   SpO2 95%   BMI 41.20 kg/m     Estimated body mass index is 41.2 kg/m  as calculated from the following:    Height as of 10/15/24: 1.753 m (5' 9\").    Weight as of this encounter: 126.6 kg (279 lb).    Physical Exam  GENERAL: alert and no distress  RESP: lungs clear to auscultation - no rales, rhonchi or wheezes  CV: regular rate and rhythm, normal S1 S2, no S3 or S4, no murmur, click or rub, no peripheral edema  PSYCH: mentation appears normal, affect normal/bright        Signed Electronically by: Brooks Tena, DO    Answers submitted by the patient for this visit:  Patient Health Questionnaire (Submitted on 12/11/2024)  If you checked off any problems, how difficult have these problems made it for you to do your work, take care of things at home, or get along with other people?: Somewhat difficult  PHQ9 TOTAL SCORE: 8  Patient Health Questionnaire (G7) (Submitted on 12/11/2024)  SHIVANI 7 TOTAL SCORE: 5    "

## 2024-12-12 LAB — SHBG SERPL-SCNC: 14 NMOL/L (ref 11–80)

## 2024-12-15 LAB
TESTOST FREE SERPL-MCNC: 7.01 NG/DL
TESTOST SERPL-MCNC: 243 NG/DL (ref 240–950)

## 2024-12-31 ENCOUNTER — MYC REFILL (OUTPATIENT)
Dept: INTERNAL MEDICINE | Facility: OTHER | Age: 34
End: 2024-12-31

## 2024-12-31 DIAGNOSIS — F41.1 GAD (GENERALIZED ANXIETY DISORDER): ICD-10-CM

## 2024-12-31 RX ORDER — ALPRAZOLAM 0.25 MG/1
0.25 TABLET ORAL 3 TIMES DAILY PRN
Qty: 60 TABLET | Refills: 1 | Status: SHIPPED | OUTPATIENT
Start: 2024-12-31

## 2025-01-05 ENCOUNTER — HEALTH MAINTENANCE LETTER (OUTPATIENT)
Age: 35
End: 2025-01-05

## 2025-01-23 NOTE — PROGRESS NOTES
Assessment & Plan     Encounter to establish care  - Previously followed with Dr. Tena  - Medical history, medication list reviewed and updated as able  - Recent annual physical 12/11/2024    ALEJANDRO (obstructive sleep apnea)  Recent sleep study, diagnosis severe ALEJANDRO with sustained hypoxemia with recommendation for CPAP, suboptimal CPAP use to this point but plans to work on this.  Sounds like intermittent CPAP use secondary to chronic insomnia.  - Plans to start using CPAP consistently    Thrombocytopenia  Persistent mild thrombocytopenia unclear etiology.  Slight downtrend over recent years.  - CBC with platelets and differential; Future  - Lab Blood Morphology Pathologist Review; Future    Recurrent major depressive disorder, in full remission  SHIVANI (generalized anxiety disorder)  Nice review today, very productive conversation, very good insight.  Has been on benzodiazepine therapy since 2017 and aware of long-term risks.  Will use remaining alprazolam supply and then switch over to clonazepam as discussed with prior PCP.  In addition we will also start low-dose Lexapro as more sustainable regimen and to help with gradual benzodiazepine weaning process.  Plan for slow and steady benzo wean.  - clonazePAM (KLONOPIN) 0.5 MG tablet; Take 1 tablet (0.5 mg) by mouth 2 times daily as needed for anxiety.  Dispense: 60 tablet; Refill: 0  - escitalopram (LEXAPRO) 5 MG tablet; Take 1 tablet (5 mg) by mouth daily for 14 days, THEN 2 tablets (10 mg) daily.  Dispense: 194 tablet; Refill: 0  - Controlled substance contract, UDS if needing to stay on benzodiazepines long-term    Hypertension goal BP (blood pressure) < 140/90  Controlled on current regimen.    Obesity (BMI 35.0-39.9) with comorbidity (H)  Reviewed.  Reinforced healthy lifestyle management.    Hyperlipidemia LDL goal <100  Persistent moderate mixed hyperlipidemia, checked about a month ago but not on statin at the time, has since resumed daily atorvastatin -  "titrate as indicated.  Will return for repeat labs.  - Lipid Profile (Chol, Trig, HDL, LDL calc); Future  - Hepatic panel (Albumin, ALT, AST, Bili, Alk Phos, TP); Future      BMI  Estimated body mass index is 41.14 kg/m  as calculated from the following:    Height as of this encounter: 1.753 m (5' 9\").    Weight as of this encounter: 126.4 kg (278 lb 9.6 oz).   Weight management plan: Discussed healthy diet and exercise guidelines    I spent a total of 43 minutes on the day of the visit.   Time spent by me today doing chart review, history and exam, documentation and further activities per the note    The longitudinal plan of care for the diagnosis(es)/condition(s) as documented were addressed during this visit. Due to the added complexity in care, I will continue to support Tawanda in the subsequent management and with ongoing continuity of care.    Follow-up mood/med check in about 3 months.  Follow-up for annual physical 12/2025.    Subjective   Tawanda is a 34 year old, presenting for the following health issues:  Establish Care, Hypertension, Depression, and Anxiety    History of Present Illness       Reason for visit:  Establish primary care   He is taking medications regularly.     Establish care  -Previously followed with Dr. Tena  -Recent annual physical 12/11/2024  -Would like to follow-up on anxiety medication, thrombocytopenia    Hypertension Follow-up  Do you check your blood pressure regularly outside of the clinic? Yes   Are you following a low salt diet? Yes  Are your blood pressures ever more than 140 on the top number (systolic) OR more   than 90 on the bottom number (diastolic), for example 140/90? No    Thrombocytopenia  -Ongoing and fairly stable but unclear etiology  -Has not had further workup to this point  -Asymptomatic    Depression and Anxiety   How are you doing with your depression since your last visit? No change  How are you doing with your anxiety since your last visit?  No " change  Are you having other symptoms that might be associated with depression or anxiety? No  Have you had a significant life event? No   Do you have any concerns with your use of alcohol or other drugs? No  -Has been on Xanax for anxiety since about 2017  -Briefly on Prozac in the past, not sure of effectiveness  -Recently discussed with prior PCP; discussed plan to switch to Klonopin for better overall stability but never actually went through with the switch  -Has been doing a lot of research, concerned about long-term benzodiazepine use for a number of reasons  -Definitely feels like there is some roller coaster control with 3 times daily alprazolam  -Realizes that it will be challenging to get off the Xanax but long-term would like to find safer and more sustainable regimen  -Open to trying SSRI and hopefully gradually weaning benzodiazepines  -Would also like to proceed with the switching over to the Klonopin  -Does have some alprazolam left and will use this up first    Social History     Tobacco Use    Smoking status: Never    Smokeless tobacco: Never   Vaping Use    Vaping status: Never Used   Substance Use Topics    Alcohol use: Yes     Alcohol/week: 10.0 standard drinks of alcohol     Types: 10 Standard drinks or equivalent per week    Drug use: Not Currently         5/9/2023     7:37 AM 5/21/2024     8:36 AM 12/11/2024     8:20 AM   PHQ   PHQ-9 Total Score 2 4 8    Q9: Thoughts of better off dead/self-harm past 2 weeks Not at all Not at all Not at all       Patient-reported         12/14/2020     9:34 AM 10/3/2022     8:36 AM 12/11/2024     8:22 AM   SHIVANI-7 SCORE   Total Score   5 (mild anxiety)   Total Score 2 2 5        Patient-reported         12/11/2024     8:20 AM   Last PHQ-9   1.  Little interest or pleasure in doing things 1   2.  Feeling down, depressed, or hopeless 1   3.  Trouble falling or staying asleep, or sleeping too much 2   4.  Feeling tired or having little energy 2   5.  Poor appetite  "or overeating 1   6.  Feeling bad about yourself 1   7.  Trouble concentrating 0   8.  Moving slowly or restless 0   Q9: Thoughts of better off dead/self-harm past 2 weeks 0   PHQ-9 Total Score 8        Patient-reported         12/11/2024     8:22 AM   SHIVANI-7    1. Feeling nervous, anxious, or on edge 1   2. Not being able to stop or control worrying 1   3. Worrying too much about different things 1   4. Trouble relaxing 1   5. Being so restless that it is hard to sit still 1   6. Becoming easily annoyed or irritable 0   7. Feeling afraid, as if something awful might happen 0   SHIVANI-7 Total Score 5    If you checked any problems, how difficult have they made it for you to do your work, take care of things at home, or get along with other people? Somewhat difficult       Patient-reported       Suicide Assessment Five-step Evaluation and Treatment (SAFE-T)      Review of Systems  Constitutional, HEENT, cardiovascular, pulmonary, gi and gu systems are negative, except as otherwise noted.      Objective    /70 (BP Location: Left arm, Patient Position: Sitting, Cuff Size: Adult Large)   Pulse 91   Temp 98.2  F (36.8  C) (Tympanic)   Resp 18   Ht 1.753 m (5' 9\")   Wt 126.4 kg (278 lb 9.6 oz)   SpO2 97%   BMI 41.14 kg/m    Body mass index is 41.14 kg/m .  Physical Exam  Vitals reviewed.   Constitutional:       General: He is not in acute distress.     Appearance: Normal appearance. He is not toxic-appearing.   HENT:      Head: Normocephalic and atraumatic.   Musculoskeletal:         General: Normal range of motion.   Skin:     General: Skin is warm and dry.   Neurological:      General: No focal deficit present.      Mental Status: He is alert and oriented to person, place, and time.   Psychiatric:         Mood and Affect: Mood normal.         Behavior: Behavior normal.        Signed Electronically by: Bandar Heath MD    "

## 2025-01-23 NOTE — PROGRESS NOTES
{PROVIDER CHARTING PREFERENCE:435330}    David Neff is a 34 year old, presenting for the following health issues:  No chief complaint on file.  {(!) Visit Details have not yet been documented.  Please enter Visit Details and then use this list to pull in documentation. (Optional):898494}  HPI     Establish Care:    Hyperlipidemia Follow-Up    Are you regularly taking any medication or supplement to lower your cholesterol?   Yes- Atorvastatin  Are you having muscle aches or other side effects that you think could be caused by your cholesterol lowering medication?  { :131254}    Hypertension Follow-up    Do you check your blood pressure regularly outside of the clinic? { :458279}   Are you following a low salt diet? { :973235}  Are your blood pressures ever more than 140 on the top number (systolic) OR more   than 90 on the bottom number (diastolic), for example 140/90? { :768946}    Depression and Anxiety   How are you doing with your depression since your last visit? { :367911}  How are you doing with your anxiety since your last visit?  { :531798}  Are you having other symptoms that might be associated with depression or anxiety? { :552445}  Have you had a significant life event? { :612787}   Do you have any concerns with your use of alcohol or other drugs? { :867076}    Social History     Tobacco Use    Smoking status: Never    Smokeless tobacco: Never   Vaping Use    Vaping status: Never Used   Substance Use Topics    Alcohol use: Yes     Alcohol/week: 10.0 standard drinks of alcohol     Types: 10 Standard drinks or equivalent per week    Drug use: Not Currently         5/9/2023     7:37 AM 5/21/2024     8:36 AM 12/11/2024     8:20 AM   PHQ   PHQ-9 Total Score 2 4 8    Q9: Thoughts of better off dead/self-harm past 2 weeks Not at all Not at all Not at all       Patient-reported         12/14/2020     9:34 AM 10/3/2022     8:36 AM 12/11/2024     8:22 AM   SHIVANI-7 SCORE   Total Score   5 (mild anxiety)    Total Score 2 2 5        Patient-reported     {Last PHQ9 or GAD7 Responses (Optional):597419}    Suicide Assessment Five-step Evaluation and Treatment (SAFE-T)  {Provider  Link to Depression Care Package SmartSet :044397}    {ACUTE SUPERLIST - extended history:210169}  {additonal problems for provider to add (Optional):134702}    {ROS Picklists (Optional):321217}      Objective    There were no vitals taken for this visit.  There is no height or weight on file to calculate BMI.  Physical Exam   {Exam List (Optional):042447}    {Diagnostic Test Results (Optional):972026}        Signed Electronically by: Bandar Heath MD  {Email feedback regarding this note to [email or web address]   :925862}

## 2025-01-27 ENCOUNTER — OFFICE VISIT (OUTPATIENT)
Dept: FAMILY MEDICINE | Facility: OTHER | Age: 35
End: 2025-01-27
Attending: STUDENT IN AN ORGANIZED HEALTH CARE EDUCATION/TRAINING PROGRAM
Payer: COMMERCIAL

## 2025-01-27 VITALS
HEIGHT: 69 IN | TEMPERATURE: 98.2 F | WEIGHT: 278.6 LBS | RESPIRATION RATE: 18 BRPM | BODY MASS INDEX: 41.26 KG/M2 | SYSTOLIC BLOOD PRESSURE: 138 MMHG | HEART RATE: 91 BPM | DIASTOLIC BLOOD PRESSURE: 70 MMHG | OXYGEN SATURATION: 97 %

## 2025-01-27 DIAGNOSIS — F33.42 RECURRENT MAJOR DEPRESSIVE DISORDER, IN FULL REMISSION: ICD-10-CM

## 2025-01-27 DIAGNOSIS — F41.1 GAD (GENERALIZED ANXIETY DISORDER): ICD-10-CM

## 2025-01-27 DIAGNOSIS — E78.5 HYPERLIPIDEMIA LDL GOAL <100: ICD-10-CM

## 2025-01-27 DIAGNOSIS — E66.01 MORBID OBESITY (H): ICD-10-CM

## 2025-01-27 DIAGNOSIS — D69.6 THROMBOCYTOPENIA: ICD-10-CM

## 2025-01-27 DIAGNOSIS — G47.33 OSA (OBSTRUCTIVE SLEEP APNEA): ICD-10-CM

## 2025-01-27 DIAGNOSIS — Z76.89 ENCOUNTER TO ESTABLISH CARE: Primary | ICD-10-CM

## 2025-01-27 DIAGNOSIS — I10 HYPERTENSION GOAL BP (BLOOD PRESSURE) < 140/90: ICD-10-CM

## 2025-01-27 PROBLEM — D69.1: Status: ACTIVE | Noted: 2025-01-27

## 2025-01-27 RX ORDER — ESCITALOPRAM OXALATE 5 MG/1
TABLET ORAL
Qty: 194 TABLET | Refills: 0 | Status: SHIPPED | OUTPATIENT
Start: 2025-01-27 | End: 2025-05-11

## 2025-01-27 RX ORDER — CLONAZEPAM 0.5 MG/1
0.5 TABLET ORAL 2 TIMES DAILY PRN
COMMUNITY
Start: 2025-01-27 | End: 2025-01-27

## 2025-01-27 RX ORDER — CLONAZEPAM 0.5 MG/1
0.5 TABLET ORAL 2 TIMES DAILY PRN
Qty: 60 TABLET | Refills: 0 | Status: SHIPPED | OUTPATIENT
Start: 2025-01-27

## 2025-01-27 RX ORDER — CLONAZEPAM 0.5 MG/1
0.5 TABLET ORAL
COMMUNITY
Start: 2024-07-13 | End: 2025-01-27

## 2025-01-27 ASSESSMENT — PAIN SCALES - GENERAL: PAINLEVEL_OUTOF10: NO PAIN (0)

## 2025-01-28 PROBLEM — E78.5 HYPERLIPIDEMIA LDL GOAL <100: Status: ACTIVE | Noted: 2025-01-28

## 2025-03-02 ENCOUNTER — MYC REFILL (OUTPATIENT)
Dept: FAMILY MEDICINE | Facility: OTHER | Age: 35
End: 2025-03-02

## 2025-03-02 DIAGNOSIS — F33.42 RECURRENT MAJOR DEPRESSIVE DISORDER, IN FULL REMISSION: ICD-10-CM

## 2025-03-02 DIAGNOSIS — F41.1 GAD (GENERALIZED ANXIETY DISORDER): ICD-10-CM

## 2025-03-03 DIAGNOSIS — I10 HYPERTENSION GOAL BP (BLOOD PRESSURE) < 140/90: ICD-10-CM

## 2025-03-03 RX ORDER — LISINOPRIL 30 MG/1
30 TABLET ORAL DAILY
Qty: 90 TABLET | Refills: 3 | Status: SHIPPED | OUTPATIENT
Start: 2025-03-03

## 2025-03-03 RX ORDER — CLONAZEPAM 0.5 MG/1
0.5 TABLET ORAL 2 TIMES DAILY PRN
Qty: 60 TABLET | Refills: 0 | Status: SHIPPED | OUTPATIENT
Start: 2025-03-03

## 2025-03-03 NOTE — TELEPHONE ENCOUNTER
clonazePAM (KLONOPIN) 0.5 MG tablet         Last Written Prescription Date:  1/27/25  Last Fill Quantity: 60,   # refills: 0  Last Office Visit: 1/27/25  Future Office visit:    Next 5 appointments (look out 90 days)      Apr 28, 2025 10:30 AM  (Arrive by 10:15 AM)  Provider Visit with Bandar Heath MD  [facility] [place] ([facility] [place] ) [address]  [place] [address]  [phone]             Routing refill request to provider for review/approval because:  Drug not on the [facility], [facility] or  Health refill protocol or controlled substance

## 2025-03-30 ENCOUNTER — MYC REFILL (OUTPATIENT)
Dept: FAMILY MEDICINE | Facility: OTHER | Age: 35
End: 2025-03-30

## 2025-03-30 DIAGNOSIS — F41.1 GAD (GENERALIZED ANXIETY DISORDER): ICD-10-CM

## 2025-03-30 DIAGNOSIS — F33.42 RECURRENT MAJOR DEPRESSIVE DISORDER, IN FULL REMISSION: ICD-10-CM

## 2025-03-31 RX ORDER — CLONAZEPAM 0.5 MG/1
0.5 TABLET ORAL 2 TIMES DAILY PRN
Qty: 60 TABLET | Refills: 0 | Status: SHIPPED | OUTPATIENT
Start: 2025-04-02

## 2025-03-31 NOTE — TELEPHONE ENCOUNTER
clonazePAM (KLONOPIN) 0.5 MG tablet         Last Written Prescription Date:  3/3/25  Last Fill Quantity: 60,   # refills: 0  Last Office Visit: 1/27/25  Future Office visit:    Next 5 appointments (look out 90 days)      Apr 28, 2025 10:30 AM  (Arrive by 10:15 AM)  Provider Visit with Bandar Heath MD  [facility] [address]  [place] [address]  [phone]             Routing refill request to provider for review/approval because:  Rx Protocol Controlled Substance Failed      Urine drug screeen results on file in past 12 months    [unfilled]     Pain Agreement on file in last 12 months    Please review last Controlled Substance Pain agreement document.   CSA -- Encounter Level:    CSA: None found at the encounter level.      CSA -- Patient Level:     [Media Unavailable] Controlled Substance Agreement - Non - Opioid - Scan on 3/16/2020  2:37 PM: NON-OPIOID CONTROLLED SUBSTANCE AGREEMENT          Auto Fail - Please forward to Provider

## 2025-04-24 NOTE — PROGRESS NOTES
Assessment & Plan     Recurrent major depressive disorder, in full remission  SHIVANI (generalized anxiety disorder)  Seen for [facility] care visit about 3 months ago.  Reviewed long history of mood disorder and has been on chronic benzodiazepine therapy since 2017.  Discussed approach for optimization and improved stability; started SSRI and switched alprazolam to clonazepam.  Overall feels like mood/anxiety has stabilized, feeling quite well on current regimen.  Main struggle lately has been return to kratom use as below.  No changes on his psychotropic medications today other than switching Lexapro to nightly dosing.  Discussed trial of weaning to 1 clonazepam daily as able but no changes to prescription for now.  - clonazePAM (KLONOPIN) 0.5 MG tablet; Take 1 tablet (0.5 mg) by mouth 2 times daily as needed for anxiety.  - Annual controlled substance contract signed 4/28/2025  - Continue Lexapro 10 mg daily  - Drug Confirmation Panel Urine with Creat; Future  - Drug Confirmation Panel Urine with Creat  - Standing offer for behavioral health referral    Substance use  Nausea  Struggling with daily kratom extract use lately.  Had been in recovery for a number of months after some type of Suboxone quick taper but relapsed reportedly out of boredom.  Now getting some associated nausea, tremors, and weakness; symptoms more prominent in the in the morning and seem to fit with withdrawal syndrome.  He is requesting another Suboxone quick taper although discussed concern for quick relapse if only using Suboxone for 1 week.  Certainly support his desire for sobriety and will have him return for short interval follow-up for more formal OUD consultation and further education.  In the meantime would like refill of Zofran to help with his nausea.  - ondansetron (ZOFRAN ODT) 4 MG ODT tab; Take 1 tablet (4 mg) by mouth every 8 hours as needed for nausea.  - Will reach out to RN CC and get patient scheduled for OUD consult    I  spent a total of 35 minutes on the day of the visit.   Time spent by me today doing chart review, history and exam, documentation and further activities per the note    The longitudinal plan of care for the diagnosis(es)/condition(s) as documented were addressed during this visit. Due to the added complexity in care, I will continue to support Tawanda Ramos in the subsequent management and with ongoing continuity of care.    Follow-up in the next 1 to 2 weeks.    Subjective   Tawanda is a 34 year old, presenting for the following health issues:  Depression and Anxiety        4/28/2025    10:09 AM   Additional Questions   Roomed by Vinay Olivarez LPN   Accompanied by Self         4/28/2025    10:09 AM   Patient Reported Additional Medications   Patient reports taking the following new medications None     History of Present Illness       Mental Health Follow-up:  Patient presents to follow-up on Depression & Anxiety.Patient's depression since last visit has been:  No change  The patient is not having other symptoms associated with depression.  Patient's anxiety since last visit has been:  Good  The patient is not having other symptoms associated with anxiety.  Any significant life events: No  Patient is not feeling anxious or having panic attacks.  Patient has concerns about alcohol or drug use.    He eats 0-1 servings of fruits and vegetables daily.He consumes 1 sweetened beverage(s) daily.He exercises with enough effort to increase his heart rate 10 to 19 minutes per day.  He exercises with enough effort to increase his heart rate 3 or less days per week.   He is taking medications regularly.        Depression and Anxiety   How are you doing with your depression since your last visit? No change  How are you doing with your anxiety since your last visit?  Improved   Are you having other symptoms that might be associated with depression or anxiety? Yes:  States hasn't been feeling well.  Having some nausea and  vomiting, also is having some tremors.  Have you had a significant life event? No   Do you have any concerns with your use of alcohol or other drugs? Yes:       -Seen for establish care about 2 months ago  -Had been on benzodiazepine therapy since 2017  -Recommend switching from alprazolam to clonazepam, and possibly eventual weaning  -Also discussed starting Lexapro to help provide some stability to help with the benzodiazepine changes  -Was able to start the Lexapro  -Overall feels like anxiety has better control with Lexapro and Klonopin  -Feels more stable, less roller coaster  -May be slight drowsiness  -Has been taking Lexapro in the morning    -Also wondering about kratom treatment  -Near daily kratom extract usage  -Had been dependent on kratom in the past and did some type of a quick taper with Suboxone  -Remained in sustained remission for a number of months, but eventually started using again  -No clear trigger for restarting use, mainly was out of boredom  -Now noting intermittent tremors, nausea, and weakness that may be fitting with kratom withdrawal  -Symptoms seem more prevalent in the morning  -Wondering about another 1 week Suboxone burst  -Not particularly interested in long-term Suboxone; afraid of being hooked on on substance long-term  -Does have some interest in low-dose naltrexone and concerned about standing dose naltrexone  -Also wondering about Wellbutrin to help him quit once again    Social History     Tobacco Use    Smoking status: Never    Smokeless tobacco: Never   Vaping Use    Vaping status: Never Used   Substance Use Topics    Alcohol use: Yes     Alcohol/week: 10.0 standard drinks of alcohol     Types: 10 Standard drinks or equivalent per week    Drug use: Not Currently         5/21/2024     8:36 AM 12/11/2024     8:20 AM 4/28/2025    10:11 AM   PHQ   PHQ-9 Total Score 4 8  11    Q9: Thoughts of better off dead/self-harm past 2 weeks Not at all Not at all Not at all        "Patient-reported         10/3/2022     8:36 AM 12/11/2024     8:22 AM 4/28/2025    10:12 AM   SHIVANI-7 SCORE   Total Score  5 (mild anxiety) 3 (minimal anxiety)   Total Score 2 5  3        Patient-reported         4/28/2025    10:11 AM   Last PHQ-9   1.  Little interest or pleasure in doing things 2   2.  Feeling down, depressed, or hopeless 1   3.  Trouble falling or staying asleep, or sleeping too much 1   4.  Feeling tired or having little energy 3   5.  Poor appetite or overeating 2   6.  Feeling bad about yourself 0   7.  Trouble concentrating 1   8.  Moving slowly or restless 1   Q9: Thoughts of better off dead/self-harm past 2 weeks 0   PHQ-9 Total Score 11        Patient-reported         4/28/2025    10:12 AM   SIHVANI-7    1. Feeling nervous, anxious, or on edge 0   2. Not being able to stop or control worrying 0   3. Worrying too much about different things 0   4. Trouble relaxing 1   5. Being so restless that it is hard to sit still 1   6. Becoming easily annoyed or irritable 1   7. Feeling afraid, as if something awful might happen 0   SHIVANI-7 Total Score 3    If you checked any problems, how difficult have they made it for you to do your work, take care of things at home, or get along with other people? Very difficult       Patient-reported     Review of Systems  Constitutional, HEENT, cardiovascular, pulmonary, gi and gu systems are negative, except as otherwise noted.      Objective    /58   Pulse 97   Temp 97.6  F (36.4  C) (Tympanic)   Resp 16   Ht 1.753 m (5' 9\")   Wt 121.5 kg (267 lb 14.4 oz)   SpO2 97%   BMI 39.56 kg/m    Body mass index is 39.56 kg/m .  Physical Exam  Vitals reviewed.   Constitutional:       Appearance: Normal appearance.   HENT:      Head: Normocephalic and atraumatic.   Cardiovascular:      Rate and Rhythm: Normal rate and regular rhythm.      Heart sounds: No murmur heard.  Pulmonary:      Effort: Pulmonary effort is normal.      Breath sounds: Normal breath sounds. No " stridor. No wheezing, rhonchi or rales.   Musculoskeletal:         General: Normal range of motion.   Skin:     General: Skin is warm and dry.   Neurological:      General: No focal deficit present.      Mental Status: He is alert and oriented to person, place, and time.   Psychiatric:         Mood and Affect: Mood normal.         Behavior: Behavior normal.          Signed Electronically by: Bandar Heath MD

## 2025-04-25 ENCOUNTER — LAB (OUTPATIENT)
Dept: LAB | Facility: OTHER | Age: 35
End: 2025-04-25
Payer: COMMERCIAL

## 2025-04-25 DIAGNOSIS — E78.5 HYPERLIPIDEMIA LDL GOAL <100: ICD-10-CM

## 2025-04-25 DIAGNOSIS — D69.6 THROMBOCYTOPENIA: ICD-10-CM

## 2025-04-25 LAB
ALBUMIN SERPL BCG-MCNC: 4.5 G/DL (ref 3.5–5.2)
ALP SERPL-CCNC: 68 U/L (ref 40–150)
ALT SERPL W P-5'-P-CCNC: 38 U/L (ref 0–70)
AST SERPL W P-5'-P-CCNC: 39 U/L (ref 0–45)
BASOPHILS # BLD AUTO: 0 10E3/UL (ref 0–0.2)
BASOPHILS NFR BLD AUTO: 0 %
BILIRUB DIRECT SERPL-MCNC: 0.16 MG/DL (ref 0–0.3)
BILIRUB SERPL-MCNC: 0.4 MG/DL
CHOLEST SERPL-MCNC: 263 MG/DL
EOSINOPHIL # BLD AUTO: 0 10E3/UL (ref 0–0.7)
EOSINOPHIL NFR BLD AUTO: 0 %
ERYTHROCYTE [DISTWIDTH] IN BLOOD BY AUTOMATED COUNT: 14 % (ref 10–15)
FASTING STATUS PATIENT QL REPORTED: YES
HCT VFR BLD AUTO: 35 % (ref 40–53)
HDLC SERPL-MCNC: 62 MG/DL
HGB BLD-MCNC: 12.5 G/DL (ref 13.3–17.7)
IMM GRANULOCYTES # BLD: 0.1 10E3/UL
IMM GRANULOCYTES NFR BLD: 1 %
LDLC SERPL CALC-MCNC: ABNORMAL MG/DL
LYMPHOCYTES # BLD AUTO: 0.4 10E3/UL (ref 0.8–5.3)
LYMPHOCYTES NFR BLD AUTO: 7 %
MCH RBC QN AUTO: 31.9 PG (ref 26.5–33)
MCHC RBC AUTO-ENTMCNC: 35.7 G/DL (ref 31.5–36.5)
MCV RBC AUTO: 89 FL (ref 78–100)
MONOCYTES # BLD AUTO: 0.3 10E3/UL (ref 0–1.3)
MONOCYTES NFR BLD AUTO: 6 %
NEUTROPHILS # BLD AUTO: 5 10E3/UL (ref 1.6–8.3)
NEUTROPHILS NFR BLD AUTO: 86 %
NONHDLC SERPL-MCNC: 201 MG/DL
PLATELET # BLD AUTO: 166 10E3/UL (ref 150–450)
PROT SERPL-MCNC: 7.7 G/DL (ref 6.4–8.3)
RBC # BLD AUTO: 3.92 10E6/UL (ref 4.4–5.9)
RETICS # AUTO: 0.08 10E6/UL (ref 0.03–0.1)
RETICS/RBC NFR AUTO: 2.1 % (ref 0.5–2)
TRIGL SERPL-MCNC: 1199 MG/DL
WBC # BLD AUTO: 5.8 10E3/UL (ref 4–11)

## 2025-04-25 PROCEDURE — 85045 AUTOMATED RETICULOCYTE COUNT: CPT

## 2025-04-25 PROCEDURE — 85025 COMPLETE CBC W/AUTO DIFF WBC: CPT

## 2025-04-25 PROCEDURE — 36415 COLL VENOUS BLD VENIPUNCTURE: CPT

## 2025-04-25 PROCEDURE — 80076 HEPATIC FUNCTION PANEL: CPT

## 2025-04-25 PROCEDURE — 80061 LIPID PANEL: CPT

## 2025-04-25 PROCEDURE — 85060 BLOOD SMEAR INTERPRETATION: CPT | Performed by: PATHOLOGY

## 2025-04-28 ENCOUNTER — OFFICE VISIT (OUTPATIENT)
Dept: FAMILY MEDICINE | Facility: OTHER | Age: 35
End: 2025-04-28
Attending: STUDENT IN AN ORGANIZED HEALTH CARE EDUCATION/TRAINING PROGRAM
Payer: COMMERCIAL

## 2025-04-28 VITALS
DIASTOLIC BLOOD PRESSURE: 58 MMHG | HEART RATE: 97 BPM | BODY MASS INDEX: 39.68 KG/M2 | OXYGEN SATURATION: 97 % | TEMPERATURE: 97.6 F | RESPIRATION RATE: 16 BRPM | SYSTOLIC BLOOD PRESSURE: 128 MMHG | HEIGHT: 69 IN | WEIGHT: 267.9 LBS

## 2025-04-28 DIAGNOSIS — F19.90 SUBSTANCE USE: ICD-10-CM

## 2025-04-28 DIAGNOSIS — F33.42 RECURRENT MAJOR DEPRESSIVE DISORDER, IN FULL REMISSION: Primary | ICD-10-CM

## 2025-04-28 DIAGNOSIS — F41.1 GAD (GENERALIZED ANXIETY DISORDER): ICD-10-CM

## 2025-04-28 DIAGNOSIS — R11.0 NAUSEA: ICD-10-CM

## 2025-04-28 LAB
CREAT UR-MCNC: 115 MG/DL
PATH REPORT.COMMENTS IMP SPEC: NORMAL
PATH REPORT.FINAL DX SPEC: NORMAL
PATH REPORT.MICROSCOPIC SPEC OTHER STN: NORMAL
PATH REPORT.MICROSCOPIC SPEC OTHER STN: NORMAL

## 2025-04-28 RX ORDER — BUPRENORPHINE AND NALOXONE 8; 2 MG/1; MG/1
FILM, SOLUBLE BUCCAL; SUBLINGUAL
COMMUNITY
Start: 2025-01-29

## 2025-04-28 RX ORDER — ONDANSETRON 4 MG/1
4 TABLET, ORALLY DISINTEGRATING ORAL EVERY 8 HOURS PRN
Qty: 20 TABLET | Refills: 0 | Status: SHIPPED | OUTPATIENT
Start: 2025-04-28

## 2025-04-28 RX ORDER — CLONAZEPAM 0.5 MG/1
0.5 TABLET ORAL 2 TIMES DAILY PRN
Qty: 60 TABLET | Refills: 0 | Status: SHIPPED | OUTPATIENT
Start: 2025-04-28

## 2025-04-28 ASSESSMENT — ANXIETY QUESTIONNAIRES
1. FEELING NERVOUS, ANXIOUS, OR ON EDGE: NOT AT ALL
5. BEING SO RESTLESS THAT IT IS HARD TO SIT STILL: SEVERAL DAYS
7. FEELING AFRAID AS IF SOMETHING AWFUL MIGHT HAPPEN: NOT AT ALL
4. TROUBLE RELAXING: SEVERAL DAYS
7. FEELING AFRAID AS IF SOMETHING AWFUL MIGHT HAPPEN: NOT AT ALL
2. NOT BEING ABLE TO STOP OR CONTROL WORRYING: NOT AT ALL
GAD7 TOTAL SCORE: 3
GAD7 TOTAL SCORE: 3
8. IF YOU CHECKED OFF ANY PROBLEMS, HOW DIFFICULT HAVE THESE MADE IT FOR YOU TO DO YOUR WORK, TAKE CARE OF THINGS AT HOME, OR GET ALONG WITH OTHER PEOPLE?: VERY DIFFICULT
3. WORRYING TOO MUCH ABOUT DIFFERENT THINGS: NOT AT ALL
GAD7 TOTAL SCORE: 3
IF YOU CHECKED OFF ANY PROBLEMS ON THIS QUESTIONNAIRE, HOW DIFFICULT HAVE THESE PROBLEMS MADE IT FOR YOU TO DO YOUR WORK, TAKE CARE OF THINGS AT HOME, OR GET ALONG WITH OTHER PEOPLE: VERY DIFFICULT
6. BECOMING EASILY ANNOYED OR IRRITABLE: SEVERAL DAYS

## 2025-04-28 ASSESSMENT — PAIN SCALES - GENERAL: PAINLEVEL_OUTOF10: MILD PAIN (3)

## 2025-04-28 ASSESSMENT — PATIENT HEALTH QUESTIONNAIRE - PHQ9
SUM OF ALL RESPONSES TO PHQ QUESTIONS 1-9: 11
10. IF YOU CHECKED OFF ANY PROBLEMS, HOW DIFFICULT HAVE THESE PROBLEMS MADE IT FOR YOU TO DO YOUR WORK, TAKE CARE OF THINGS AT HOME, OR GET ALONG WITH OTHER PEOPLE: VERY DIFFICULT
SUM OF ALL RESPONSES TO PHQ QUESTIONS 1-9: 11

## 2025-04-28 NOTE — LETTER
[facility]  04/28/25  Patient: Tawanda Ramos  YOB: 1990  Medical Record Number: [id number]                                                                                  Non-Opioid Controlled Substance Agreement    This is an agreement between you and your provider regarding safe and appropriate use of controlled substances prescribed by your care team. Controlled substances are?medicines that can cause physical and mental dependence (abuse).     There are strict laws about having and using these medicines. We here at [facility] are  committed to working with you in your efforts to get better. To support you in this work, we'll help you schedule regular office appointments for medicine refills. If we must cancel or change your appointment for any reason, we'll make sure you have enough medicine to last until your next appointment.     As a Provider, I will:   Listen carefully to your concerns while treating you with respect.   Recommend a treatment plan that I believe is in your best interest and may involve therapies other than medicine.    Talk with you often about the possible benefits and the risk of harm of any medicine that we prescribe for you.  Assess the safety of this medicine and check how well it works.    Provide a plan on how to taper (discontinue or go off) using this medicine if the decision is made to stop its use.      ::  As a Patient, I understand controlled substances:     Are prescribed by my care provider to help me function or work and manage my condition(s).?  Are strong medicines and can cause serious side effects.     Need to be taken exactly as prescribed.?Combining controlled substances with certain medicines or chemicals (such as illegal drugs, alcohol, sedatives, sleeping pills, and benzodiazepines) can be dangerous or even fatal.? If I stop taking my medicines suddenly, I may have severe withdrawal symptoms.     The risks, benefits, and side  effects of these medicine(s) were explained to me. I agree that:    I will take part in other treatments as advised by my care team. This may be psychiatry or counseling, physical therapy, behavioral therapy, group treatment or a referral to specialist.    I will keep all my appointments and understand this is part of the monitoring of controlled substances.?My care team may require an office visit for EVERY controlled substance refill. If I miss appointments or don t follow instructions, my care team may stop my medicine    I will take my medicines as prescribed. I will not change the dose or schedule unless my care team tells me to. There will be no refills if I run out early.      I may be asked to come to the clinic and complete a urine drug test or complete a pill count. If I don t give a urine sample or participate in a pill count, the care team may stop my medicine.    I will only receive controlled substance prescriptions from this clinic. If I am treated by another provider, I will tell them that I am taking controlled substances and that I have a treatment agreement with this provider. I will inform my [facility] care team within one business day if I am given a prescription for any controlled substance by another healthcare provider. My [facility] care team can contact other providers and pharmacists about my use of any medicines.    It is up to me to make sure that I don't run out of my medicines on weekends or holidays.?If my care team is willing to refill my prescription without a visit, I must request refills only during office hours. Refills may take up to 3 business days to process. I will use one pharmacy to fill all my controlled substance prescriptions. I will notify the clinic about any changes to my insurance or medicine availability.    I am responsible for my prescriptions. If the medicine/prescription is lost, stolen or destroyed, it will not be replaced.?I also agree not to  share controlled substance medicines with anyone.     I am aware I should not use any illegal or recreational drugs. I agree not to drink alcohol unless my care team says I can.     If I enroll in the [place] [facility] Cannabis program, I will tell my care team before my next refill.    I will tell my care team right away if I become pregnant, have a new medical problem treated outside of my regular clinic, or have a change in my medicines.     I understand that this medicine can affect my thinking, judgment and reaction time.? Alcohol and drugs affect the brain and body, which can affect the safety of my driving. Being under the influence of alcohol or drugs can affect my decision-making, behaviors, personal safety and the safety of others. Driving while impaired (DWI) can occur if a person is driving, operating or in physical control of a car, motorcycle, boat, snowmobile, ATV, motorbike, off-road vehicle or any other motor vehicle ([place] Statute 169[place].20). I understand the risk if I choose to drive or operate any vehicle or machinery.    I understand that if I do not follow any of the conditions above, my prescriptions or treatment may be stopped or changed.   I agree that my provider, clinic care team and pharmacy may work with any city, state or federal law enforcement agency that investigates the misuse, sale or other diversion of my controlled medicine. I will allow my provider to discuss my care with, or share a copy of, this agreement with any other treating provider, pharmacy or emergency room where I receive care.     I have read this agreement and have asked questions about anything I did not understand.    ________________________________________________________  Patient Signature - Tawanda Ramos     ___________________                   Date     ________________________________________________________  Provider Signature - Bandar Heath MD       ___________________                   Date      ________________________________________________________  Witness Signature (required if provider not present while patient signing)          ___________________                   Date

## 2025-04-28 NOTE — Clinical Note
This is that new [facility] patient I mention to you briefly.  Could we try to get him on the schedule in the next couple of weeks and double team him?  I think one of the main keys will be education and he has been quite receptive so far in my couple of visits.  Thanks.

## 2025-04-30 LAB
7AMINOCLONAZEPAM UR QL CFM: PRESENT
GABAPENTIN UR QL CFM: PRESENT
NORBUPRENORPHINE UR CFM-MCNC: 31 NG/ML
NORBUPRENORPHINE/CREAT UR: 27 NG/MG {CREAT}
OXAZEPAM UR QL CFM: PRESENT
TEMAZEPAM UR QL CFM: PRESENT

## 2025-05-02 NOTE — PROGRESS NOTES
Assessment & Plan     Substance use  Initial MOUD visit today, struggling with kratom extract dependence.  Has been able to wean off in the past with short Suboxone bursts a couple of times in the past year but has relapsed.  Suboxone has helped significantly with cravings and would like to restart today.  Clearly does not want to use it long-term but did discuss concerns of sustaining sobriety and possibly using Suboxone over longer duration with gradual weaning rather than 1 week burst.  Reviewed last UDS which was positive for gabapentin and Valium which he does acknowledge today but denies any use since last visit.  Starting Suboxone today, will follow-up next week.  - Visit in conjunction with RN CC; appreciate assistance  - MAT contract signed 5/7/2025  - PDMP reviewed  - Urine Drug Screen Buprenorphine Urine Qualitative [id number], Ethanol Urine Qualitative [id number], Methadone Urine Qualitative [id number], Oxycodone Urine Qualitative [id number], Creatinine Urine Random [id number]  - Drug Confirmation Panel Urine with Creat  - buprenorphine HCl-naloxone HCl (SUBOXONE) 2-0.5 MG per film; Dissolve 1 film under your tongue per provider instructions. May add 1 film every 1-2 hours as needed for withdrawal or cravings. Max 4 films per day.  - Follow-up 5/16/2025    Hyperlipidemia LDL goal <100  Recent lipid screening with severe hypertriglyceridemia, history of moderate to severe mixed hyperlipidemia.  Need to repeat lipid profile when fasting, unsure of impacts of kratom on lipid metabolism so would also like to recheck once he is off of kratom.    Normocytic anemia  Normocytic anemia, mild.  Blood smear 4/25/2025.  Screening nutritional deficiencies today.  Also unsure of impact on kratom on hematopoiesis.  Will continue to monitor.  No history of blood loss.  - Vitamin B12; Future  - Folate; Future  - Iron and iron binding capacity; Future  - Ferritin; Future  - TSH with free T4 reflex; Future    I spent a total of 33 minutes  on the day of the visit.   Time spent by me today doing chart review, history and exam, documentation and further activities per the note    The longitudinal plan of care for the diagnosis(es)/condition(s) as documented were addressed during this visit. Due to the added complexity in care, I will continue to support Tawanda Ramos in the subsequent management and with ongoing continuity of care.    Follow-up 1 week.    Subjective   Tawanda is a 34 year old, presenting for the following health issues:  Recheck Medication        5/7/2025    10:45 AM   Additional Questions   Roomed by Dino Rocha   Accompanied by None         5/7/2025    10:45 AM   Patient Reported Additional Medications   Patient reports taking the following new medications None     HPI        Current Narcotic Use/History:  Which opioid(s) are you currently using, that are not already on your med list?: Kratom  How do you use your drug of choice? Kratom extracts - 7OH  What is your estimated total dose (mg if pills, grams of heroin) per day? Pill form - upward of 100mg per day  When did you last use? Yesterday   Have you ever tried to quit on your own? YES- temporary success  What have you done to try quiting in the past? Has been prescribed 2 Suboxone tapers in the past - 8/2mg #14 films - seems to have tolerated both tapers quite well in regards to withdrawal symptoms  -but eventually returned to use due to boredom   What was the longest period of time you have been sober from opioids/kratom?: a couple of months  When and how did you start using opioids/kratom? Less than 1 year - started to enhance job performance - slowly developed tolerance and withdrawal symptoms when trying to stop using the Kratom      Does admit to taking Gabapentin and Valium prior to his last appointment - which correlates with his UDS results from that day    Declined Narcan RX.      Other Substance/Psychiatric History:  Have you been struggling with any other mental  health symptoms?: Yes- Anxiety, Depression  Any other drug use other than opioids?: (!) CANNABIS PRODUCTS and (!) KRATOM  - THC gummies for sleep  Do you struggle with any other addictive behaviors (sex, gambling, internet, shopping, TV etc): None    Social History  Housing status: alone  Employment status: Employed full time- [employer]   Relationship status: Single  Children: no children  Legal: None   Insurance needs: None  Contact information up to date? Yes    Family History:  Does anyone in your family have a history of a use disorder? YES- grandparents - possibly ETOH     PHQ Score:      5/21/2024     8:36 AM 12/11/2024     8:20 AM 4/28/2025    10:11 AM   PHQ   PHQ-9 Total Score 4 8  11    Q9: Thoughts of better off dead/self-harm past 2 weeks Not at all Not at all Not at all       Patient-reported     GAD7 Score:       10/3/2022     8:36 AM 12/11/2024     8:22 AM 4/28/2025    10:12 AM   SHIVANI-7 SCORE   Total Score  5 (mild anxiety) 3 (minimal anxiety)   Total Score 2 5  3        Patient-reported     {  PDMP Review         Value Time User    State PDMP site checked  Yes 4/28/2025 11:00 AM Bandar Heath MD            Review of Systems  Constitutional, HEENT, cardiovascular, pulmonary, gi and gu systems are negative, except as otherwise noted.      Objective    /76 (BP Location: Right arm, Patient Position: Sitting, Cuff Size: Adult Large)   Pulse 86   Temp 97.5  F (36.4  C) (Tympanic)   Resp 14   Wt 117 kg (257 lb 14.4 oz)   SpO2 97%   BMI 38.09 kg/m    Body mass index is 38.09 kg/m .  Physical Exam  Vitals reviewed.   Constitutional:       Appearance: Normal appearance.   HENT:      Head: Normocephalic and atraumatic.   Musculoskeletal:         General: Normal range of motion.   Skin:     General: Skin is warm and dry.   Neurological:      General: No focal deficit present.      Mental Status: He is alert and oriented to person, place, and time.   Psychiatric:         Mood and Affect: Mood  normal.         Behavior: Behavior normal.        Results for orders placed or performed in visit on 05/07/25 (from the past 24 hours)   Urine Drug Screen Buprenorphine Urine Qualitative [id number], Ethanol Urine Qualitative [id number], Methadone Urine Qualitative [id number], Oxycodone Urine Qualitative [id number], Creatinine Urine Random [id number]    Narrative    The following orders were created for panel order Urine Drug Screen Buprenorphine Urine Qualitative [id number], Ethanol Urine Qualitative [id number], Methadone Urine Qualitative [id number], Oxycodone Urine Qualitative [id number], Creatinine Urine Random [id number].  Procedure                               Abnormality         Status                     ---------                               -----------         ------                     Urine Drug Screen Panel[[id number]]     Abnormal            Final result               Buprenorphine Urine, Qu...[[id number]]  Normal              Final result               Ethanol urine[[id number]]               Normal              Final result               Methadone Qual Urine[[id number]]        Normal              Final result               Oxycodone Urine, Qualit...[[id number]]  Normal              Final result               Creatinine random urine[[id number]]                         Final result                 Please view results for these tests on the individual orders.   Urine Drug Screen Panel   Result Value Ref Range    Amphetamines Urine Screen Negative Screen Negative    Barbituates Urine Screen Negative Screen Negative    Benzodiazepine Urine Screen Positive (A) Screen Negative    Cannabinoids Urine Screen Positive (A) Screen Negative    Cocaine Urine Screen Negative Screen Negative    Fentanyl Qual Urine Screen Negative Screen Negative    Opiates Urine Screen Negative Screen Negative    PCP Urine Screen Negative Screen Negative   Buprenorphine Urine, Qualitative   Result Value Ref Range    Buprenorphine Qual Urine Screen Negative Screen  Negative   Ethanol urine   Result Value Ref Range    Ethanol Urine Screen Negative Screen Negative   Methadone Qual Urine   Result Value Ref Range    Methadone Urine Screen Negative Screen Negative   Oxycodone Urine, Qualitative   Result Value Ref Range    Oxycodone Urine Screen Negative Screen Negative   Creatinine random urine   Result Value Ref Range    Creatinine Urine mg/dL 136.7 mg/dL   Drug Confirmation Panel Urine with Creat    Narrative    The following orders were created for panel order Drug Confirmation Panel Urine with Creat.  Procedure                               Abnormality         Status                     ---------                               -----------         ------                     Urine Drug Confirmation...[[id number]]                      In process                 Urine Creatinine for Dr...[[id number]]                      Final result                 Please view results for these tests on the individual orders.   Urine Creatinine for Drug Screen Panel   Result Value Ref Range    Creatinine Urine for Drug Screen 135 mg/dL           Signed Electronically by: Bandar Heath MD  {Email feedback regarding this note to [email or web address]

## 2025-05-07 ENCOUNTER — MYC REFILL (OUTPATIENT)
Dept: FAMILY MEDICINE | Facility: OTHER | Age: 35
End: 2025-05-07

## 2025-05-07 ENCOUNTER — PATIENT OUTREACH (OUTPATIENT)
Dept: CARE COORDINATION | Facility: OTHER | Age: 35
End: 2025-05-07

## 2025-05-07 ENCOUNTER — RESULTS FOLLOW-UP (OUTPATIENT)
Dept: FAMILY MEDICINE | Facility: OTHER | Age: 35
End: 2025-05-07

## 2025-05-07 ENCOUNTER — OFFICE VISIT (OUTPATIENT)
Dept: FAMILY MEDICINE | Facility: OTHER | Age: 35
End: 2025-05-07
Attending: STUDENT IN AN ORGANIZED HEALTH CARE EDUCATION/TRAINING PROGRAM
Payer: COMMERCIAL

## 2025-05-07 ENCOUNTER — LAB (OUTPATIENT)
Dept: LAB | Facility: OTHER | Age: 35
End: 2025-05-07
Attending: STUDENT IN AN ORGANIZED HEALTH CARE EDUCATION/TRAINING PROGRAM
Payer: COMMERCIAL

## 2025-05-07 VITALS
DIASTOLIC BLOOD PRESSURE: 76 MMHG | TEMPERATURE: 97.5 F | OXYGEN SATURATION: 97 % | SYSTOLIC BLOOD PRESSURE: 126 MMHG | WEIGHT: 257.9 LBS | BODY MASS INDEX: 38.09 KG/M2 | HEART RATE: 86 BPM | RESPIRATION RATE: 14 BRPM

## 2025-05-07 DIAGNOSIS — F41.1 GAD (GENERALIZED ANXIETY DISORDER): ICD-10-CM

## 2025-05-07 DIAGNOSIS — D64.9 NORMOCYTIC ANEMIA: ICD-10-CM

## 2025-05-07 DIAGNOSIS — E78.5 HYPERLIPIDEMIA LDL GOAL <100: ICD-10-CM

## 2025-05-07 DIAGNOSIS — F33.42 RECURRENT MAJOR DEPRESSIVE DISORDER, IN FULL REMISSION: ICD-10-CM

## 2025-05-07 DIAGNOSIS — F19.90 SUBSTANCE USE: Primary | ICD-10-CM

## 2025-05-07 LAB
AMPHETAMINES UR QL SCN: ABNORMAL
BARBITURATES UR QL SCN: ABNORMAL
BENZODIAZ UR QL SCN: ABNORMAL
BUPRENORPHINE UR QL: NORMAL
BZE UR QL SCN: ABNORMAL
CANNABINOIDS UR QL SCN: ABNORMAL
CREAT UR-MCNC: 135 MG/DL
CREAT UR-MCNC: 136.7 MG/DL
ETHANOL UR QL SCN: NORMAL
FENTANYL UR QL: ABNORMAL
FERRITIN SERPL-MCNC: 1057 NG/ML (ref 31–409)
FOLATE SERPL-MCNC: 9 NG/ML (ref 4.6–34.8)
IRON BINDING CAPACITY (ROCHE): 225 UG/DL (ref 240–430)
IRON SATN MFR SERPL: 90 % (ref 15–46)
IRON SERPL-MCNC: 203 UG/DL (ref 61–157)
METHADONE UR QL SCN: NORMAL
OPIATES UR QL SCN: ABNORMAL
OXYCODONE UR QL: NORMAL
PCP QUAL URINE (ROCHE): ABNORMAL
TSH SERPL DL<=0.005 MIU/L-ACNC: 1.05 UIU/ML (ref 0.3–4.2)
VIT B12 SERPL-MCNC: 424 PG/ML (ref 232–1245)

## 2025-05-07 PROCEDURE — 82607 VITAMIN B-12: CPT

## 2025-05-07 PROCEDURE — 36415 COLL VENOUS BLD VENIPUNCTURE: CPT

## 2025-05-07 PROCEDURE — 82728 ASSAY OF FERRITIN: CPT

## 2025-05-07 PROCEDURE — 82746 ASSAY OF FOLIC ACID SERUM: CPT

## 2025-05-07 PROCEDURE — 83550 IRON BINDING TEST: CPT

## 2025-05-07 PROCEDURE — 83540 ASSAY OF IRON: CPT

## 2025-05-07 PROCEDURE — 84443 ASSAY THYROID STIM HORMONE: CPT

## 2025-05-07 RX ORDER — BUPRENORPHINE HYDROCHLORIDE, NALOXONE HYDROCHLORIDE 2; .5 MG/1; MG/1
FILM, SOLUBLE BUCCAL; SUBLINGUAL
Qty: 36 FILM | Refills: 0 | Status: SHIPPED | OUTPATIENT
Start: 2025-05-07 | End: 2025-05-07

## 2025-05-07 RX ORDER — BUPRENORPHINE AND NALOXONE 2; .5 MG/1; MG/1
FILM, SOLUBLE BUCCAL; SUBLINGUAL
Qty: 36 FILM | Refills: 0 | Status: SHIPPED | OUTPATIENT
Start: 2025-05-07

## 2025-05-07 RX ORDER — BUPRENORPHINE AND NALOXONE 2; .5 MG/1; MG/1
FILM, SOLUBLE BUCCAL; SUBLINGUAL
Qty: 28 FILM | Refills: 0 | Status: CANCELLED | OUTPATIENT
Start: 2025-05-07

## 2025-05-07 ASSESSMENT — ACTIVITIES OF DAILY LIVING (ADL): DEPENDENT_IADLS:: INDEPENDENT

## 2025-05-07 ASSESSMENT — PAIN SCALES - GENERAL: PAINLEVEL_OUTOF10: NO PAIN (0)

## 2025-05-07 NOTE — PROGRESS NOTES
Clinic Care Coordination Contact  Clinic Care Coordination Contact  OUTREACH    Referral Information:  Referral Source: PCP    Primary Diagnosis: Dual -  MH/CD    Chief Complaint   Patient presents with    Clinic Care Coordination - Face To Face        Universal Utilization: Tawanda comes in today for an initial MOUD appointment with Dr. Heath.  Tawanda has been on short tapers of Suboxone in the past for Kratom/Opioid Use Dependence with withdrawal.  He reports that he has tolerated Suboxone well in the past with no adverse effects.  Has been taking Kratom extracts - 7OH - up to 100mg daily.  He does experience withdrawal symptoms when he tries to go over 1 day without the 7OH, which is causing him to continue to use large amounts of 7OH.    Clinic Utilization  Difficulty keeping appointments:: No  Compliance Concerns: No  No-Show Concerns: No  No PCP office visit in Past Year: No  Utilization      No Show Count (past year)  0             ED Visits  0             Hospital Admissions  0                    Current as of: 5/7/2025 11:32 AM                Clinical Concerns:  Current Medical Concerns:  Kratom/Opioid Use Dependence with withdrawal    Current Behavioral Concerns: SHIVANI, Depression    Education Provided to patient: Opioid use disorder is a chronic disease.  MOUD is now the standard of care for the treatment of OUD.  Suboxone program and treatment compliance went over in detail with Tawanda.  Educated Him on the mechanism of action of Suboxone - partial opioid agonist with a ceiling effect.   Must be in mild opioid withdrawal to start Suboxone - reduce the risk of precipitated withdrawal.  The goal is to eliminate withdrawal symptoms and cravings.  If the medication needs to be cut, have dry hands and use dry scissors.  Stressed the importance of allowing the medication to fully dissolve underneath the tongue.  Suboxone will not work if it is swallowed.  Once medication is fully dissolved, advised him to rinse  "mouth out with water to help prevent any dental issues. Safe storage discussed - out of sight/reach of children.  Lost or stolen scripts will not be replaced.     Informed consent and treatment agreement signed.  Risks of benzos/ETOH use discussed - increase risk of resp depression, risk for falls/injury, etc.  All questions answered.  Provided him with CC's business card and work cell phone number and encouraged him to call with any questions/concerns/problems.     Tawanda desires not to be on Suboxone long term - doesn't want \"to become dependent\" on it.  Educated him on physical dependence (your body will go through withdrawal if you stop taking medication suddenly - this is expected with some medications) versus addiction (continued use despite harm).  RN CC respected his decision as using Suboxone as a bridge treatment to get him over the withdrawal symptoms.  Did suggest not putting a timeline on how long/short the taper is (don't want to set himself up for failure if taper does not go as planned) - suggested he base his decisions on symptoms - adequately controlling withdrawal symptoms and cravings.  He was agreeable with this.       Pain  Pain (GOAL):: No  Health Maintenance Reviewed: Due/Overdue Will continue to work on these  Clinical Pathway: None    Medication Management:  Medication review status: Medications reviewed and no changes reported per patient.             Functional Status:  Dependent ADLs:: Independent  Dependent IADLs:: Independent  Bed or wheelchair confined:: No  Mobility Status: Independent  Fallen 2 or more times in the past year?: No  Any fall with injury in the past year?: No    Living Situation:  Current living arrangement:: I live alone  Type of residence:: Apartment    Lifestyle & Psychosocial Needs:    Social Drivers of Health     Food Insecurity: Low Risk  (1/27/2025)    Food Insecurity     Within the past 12 months, did you worry that your food would run out before you got money " to buy more?: No     Within the past 12 months, did the food you bought just not last and you didn t have money to get more?: No   Depression: At risk (4/28/2025)    PHQ-2     PHQ-2 Score: 3   Housing Stability: Low Risk  (1/27/2025)    Housing Stability     Do you have housing? : Yes     Are you worried about losing your housing?: No   Tobacco Use: Low Risk  (5/7/2025)    Patient History     Smoking Tobacco Use: Never     Smokeless Tobacco Use: Never     Passive Exposure: Never   Financial Resource Strain: Low Risk  (1/27/2025)    Financial Resource Strain     Within the past 12 months, have you or your family members you live with been unable to get utilities (heat, electricity) when it was really needed?: No   Alcohol Use: Unknown (12/14/2020)    AUDIT-C     Frequency of Alcohol Consumption: 2-3 times a week     Average Number of Drinks: Not on file     Frequency of Binge Drinking: Not asked   Transportation Needs: Low Risk  (1/27/2025)    Transportation Needs     Within the past 12 months, has lack of transportation kept you from medical appointments, getting your medicines, non-medical meetings or appointments, work, or from getting things that you need?: No   Physical Activity: Insufficiently Active (12/11/2024)    Exercise Vital Sign     Days of Exercise per Week: 2 days     Minutes of Exercise per Session: 30 min   Interpersonal Safety: Low Risk  (12/11/2024)    Interpersonal Safety     Do you feel physically and emotionally safe where you currently live?: Yes     Within the past 12 months, have you been hit, slapped, kicked or otherwise physically hurt by someone?: No     Within the past 12 months, have you been humiliated or emotionally abused in other ways by your partner or ex-partner?: No   Stress: Stress Concern Present (12/11/2024)    [employer] - Occupational Stress Questionnaire     Feeling of Stress : To some extent   Social Connections: Unknown (12/11/2024)    Social  Connection and Isolation Panel [NHANES]     Frequency of Communication with Friends and Family: Not on file     Frequency of Social Gatherings with Friends and Family: More than three times a week     Attends Worship Services: Not on file     Active Member of Clubs or Organizations: Not on file     Attends Club or Organization Meetings: Not on file     Marital Status: Not on file   Health Literacy: Not on file     Diet:: Regular  Inadequate nutrition (GOAL):: No  Tube Feeding: No  Inadequate activity/exercise (GOAL):: No  Significant changes in sleep pattern (GOAL): Yes  Transportation means:: Regular car     Worship or spiritual beliefs that impact treatment:: No  Mental health DX:: Yes  Mental health DX how managed:: Medication  Mental health management concern (GOAL):: No  Chemical Dependency Status: Current Concern  Chemical Dependency Management: Other (see comment)  Informal Support system:: Family          Resources and Interventions:  Current Resources:      Community Resources: None  Supplies Currently Used at Home: None  Equipment Currently Used at Home: none  Employment Status: employed full-time       Referrals Placed: None         Care Plan:  Care Plan: Substance Use       Problem: Substance Use       Goal: Improve substance use status       This Visit's Progress: 50%    Note:     Barriers: hesitant  Strengths: honest, expresses desire to change  Patient expressed understanding of goal: yes  Action steps to achieve this goal:  1. I will start Suboxone as prescribed  2. I will attend my follow up appointment on 5/16/2025  3. I will reach out to RN CC with any questions/concerns                                Patient/Caregiver understanding: Yes    Outreach Frequency: 2 weeks, more frequently as needed  Future Appointments                In 1 week Bandar Heath MD [facility]            Plan: RN CC will follow up in 2 weeks, sooner if he has concerns.    Julia  Jb, RN-BSN, [facility] Care Coordinator  [phone]

## 2025-05-07 NOTE — LETTER
Tawanda Ramos  [id number]         May 7, 2025        Informed Consent for Treatment with Suboxone (Buprenorphine/Naloxone)    I understand that my provider is prescribing Suboxone (Buprenorphine/Naloxone) to assist in managing my opioid use disorder. This medication will be part of my overall recovery plan. Suboxone (Buprenorphine/Naloxone) is an FDA approved medication for treatment of people with opioid use disorder. Suboxone (Buprenorphine/Naloxone) comes in tablets or films that must be held under the tongue or between the gum and lip until dissolved completely as it will not be absorbed from the stomach if swallowed. Suboxone (Buprenorphine/Naloxone) can be used for detoxification or for maintenance therapy; or as long as medically necessary. Buprenorphine is a partial opioid; however, it does not activate the opioid receptors like heroin, fentanyl, or other full opioid agonists. It can result in physical dependence and should not be suddenly discontinued. Naloxone is a short-acting opioid blocker. If Suboxone (Buprenorphine/Naloxone) is dissolved and injected, smoked, or snorted, it may result in severe opioid withdrawal.  Some patients, if they suddenly discontinue Suboxone (Buprenorphine/Naloxone), may have opioid withdrawal symptoms. To minimize the possibility of opioid withdrawal, Suboxone (Buprenorphine/Naloxone) should be discontinued gradually, usually over several weeks and with provider assistance.     Individuals should be in mild to moderate withdrawal when started (induced) on Suboxone (Buprenorphine/Naloxone). If given too soon, it may cause precipitated withdrawal which could result in sudden and sometimes severe withdrawal symptoms. Ideally, the first dose should be under the guidance of a provider. Some patients may take several days to transition onto Suboxone (Buprenorphine/Naloxone). During that time, patients may have very mild withdrawal symptoms which can be minimized by other  medications prescribed by a provider. Once stabilized on Suboxone (Buprenorphine/Naloxone), other opioids will generally have much less effect. Attempts to override the Suboxone (Buprenorphine/Naloxone) by taking more opioids could result in an opioid overdose. Combining Suboxone (Buprenorphine/Naloxone) with alcohol, benzodiazepines, or other medications may also be hazardous and can result in respiratory depression or death.     Abstinence-based treatment is an option, however, data shows that medications for opioid use  disorder (MOUD) shows longer recovery and increased treatment retention and less relapse and death.    Another MOUD maintenance therapy is Methadone. Methadone is a full-agonist opioid which  binds to the receptor like heroin, fentanyl etc., but has a very long-half life so patients do not  get the same euphoria from it. Methadone for opioid use disorder can only be given in special  clinics called OTPs (historically called  methadone clinics ). If you would prefer Methadone, you  can be referred for those services.    Vivitrol, in the injectable long-acting formulation of naltrexone, is another MOUD. It completely  blocks the effects of opioids but has no opioid effects of its own. Vivitrol injections need to be  given every 4 weeks by a medical provider or clinic. You can receive this form of treatment in  our facility if requested.    The risks, benefits, and side effects of Suboxone (Buprenorphine/Naloxone) have been explained to me. I understand there are alternatives to Suboxone (Buprenorphine/Naloxone); however, currently, I am choosing to start Suboxone (Buprenorphine/Naloxone) as part of my treatment plan.      Suboxone (Buprenorphine/Naloxone) Treatment Agreement    This is an agreement between you and your provider about the safe and appropriate use of Suboxone (Buprenorphine/Naloxone) prescribed by your care team.    We are committed to collaborating with you in your efforts to get  better. To support you in this work, we will help you schedule regular office appointments for medicine refills. If we must cancel or change your appointment for any reason, we will make sure you have enough medicine to last until your next appointment.     As a Provider, I will:  Listen carefully to your concerns and treat you with respect.   Recommend a treatment plan that I believe is in your best interest. This plan may involve therapies other than Suboxone (Buprenorphine/Naloxone).   Talk with you often about the possible benefits, and the risk of harm of any medicine that we prescribe for you.   Provide a plan on how to taper (discontinue or go off) using this medicine if the decision is made to stop its use.    As a Patient, I understand that Suboxone (Buprenorphine/Naloxone):   Is a controlled substance prescribed by my care team to help me function or work and manage my condition(s).   Needs to be taken exactly as prescribed. Combining Suboxone (Buprenorphine/Naloxone) with certain medicines or chemicals (such as illegal drugs, sedatives, sleeping pills, and benzodiazepines) can be dangerous or even fatal. If I stop Suboxone (Buprenorphine/Naloxone) suddenly, I may have severe withdrawal symptoms.    The risks, benefits and side effects of these medicine(s) were explained to me. I agree that:  I will take part in other treatments as advised by my care team. This may be psychiatry or counseling, physical therapy, behavioral therapy, group treatment or a referral to a specialist.     I will keep all my appointments. I understand that this is part of the monitoring of Suboxone (Buprenorphine/Naloxone). My care team may require an office visit for EVERY refill. If I miss appointments or do not follow instructions, my care team may stop my medicine.    I will be respectful and considerate to all staff and providers at the clinic. Rude or aggressive behavior to staff including providers, nursing staff, front  desk, and any others will not be tolerated.    I will be honest with my care team.     I will take my medicines as prescribed. I will not change the dose or schedule unless my care team tells me to. There will be no refills if I run out early.     I may be asked to come to the clinic and complete a urine drug test or complete a film/pill count at any time. If I do not give a urine sample or participate in a film/pill count, my care team may stop my medicine.    It is up to me to make sure that I do not run out of my medicines on weekends or holidays. If my care team is willing to refill my Suboxone (Buprenorphine/Naloxone) prescription without a visit, I must request refills only during office hours. Refills may take up to three business days to process. I will use one pharmacy to fill all my Suboxone (Buprenorphine/Naloxone) and other controlled substance prescriptions. I will notify the clinic about any changes to my insurance or medication availability.    I am responsible for my prescriptions. If the medicine/prescription is lost, stolen, or destroyed, it will not be replaced. I will store my medication in a secure location away from children. I also agree not to share controlled substance medicines with anyone.    I am aware I should not use any illegal or recreational drugs. I agree not to drink alcohol unless my care team says I can.     I will tell my care team right away if I become pregnant, have a new medical problem treated outside of my regular clinic, or have a change in my medications.    I understand that this medicine can affect my thinking, judgment, and reaction time. Alcohol and drugs affect the brain and body, which can affect the safety of my driving. Being under the influence of alcohol or drugs can affect my decision-making, behaviors, personal safety, and the safety of others. Driving while impaired (DWI) can occur if a person is driving, operating, or in physical control of a car,  motorcycle, boat, snowmobile, ATV, motorbike, off-road vehicle, or any other motor vehicle ([place] Statute 169A.20). I understand the risk if I choose to drive or operate any vehicle or machinery.    I understand that if I do not follow any of the conditions above, my prescriptions or treatment may be stopped or changed.    I agree that my provider, clinic care team, and pharmacy may work with any city, state or federal law enforcement agency that investigates the misuse, sale, or other diversion of my controlled medicine. I will allow my provider to discuss my care with, or share a copy of, this agreement with any other treating provider, pharmacy, or emergency room where I receive care.    This agreement will remain in effect for duration of therapy and updated as appropriate, and/or  annually.    I have read this agreement and have asked questions about anything I did not understand.    __________________________________        ____________________________________  Patient     Date          Bandar Heath MD                     Date

## 2025-05-08 RX ORDER — ESCITALOPRAM OXALATE 10 MG/1
10 TABLET ORAL DAILY
Qty: 60 TABLET | Refills: 1 | Status: SHIPPED | OUTPATIENT
Start: 2025-05-08

## 2025-05-08 NOTE — TELEPHONE ENCOUNTER
escitalopram (LEXAPRO) 5 MG tablet         Last Written Prescription Date:  1/27/25  Last Fill Quantity: 194,   # refills: 0  Last Office Visit: 5/7/25  Future Office visit:       Routing refill request to provider for review/approval because:  SSRIs Protocol Failed      PHQ-9 score less than 5 in past 6 months    Please review last PHQ-9 score.        5/21/2024     8:36 AM 12/11/2024     8:20 AM 4/28/2025    10:11 AM   PHQ-9 SCORE   PHQ-9 Total Score MyChart 4 (Minimal depression) 8 (Mild depression) 11 (Moderate depression)   PHQ-9 Total Score 4 8  11

## 2025-05-08 NOTE — PROGRESS NOTES
Assessment & Plan     Substance use  Initial MOUD visit today, struggling with kratom extract dependence.  Has been able to wean off in the past with short Suboxone bursts a couple of times in the past year but has relapsed.  Suboxone has helped significantly with cravings and would like to restart today.  Clearly does not want to use it long-term but did discuss concerns of sustaining sobriety and possibly using Suboxone over longer duration with gradual weaning rather than 1 week burst.  Reviewed last UDS which was positive for gabapentin and Valium which he does acknowledge today but denies any use since last visit.  Starting Suboxone today, will follow-up next week.  - Visit in conjunction with RN CC; appreciate assistance  - MAT contract signed 5/7/2025  - PDMP reviewed  - Urine Drug Screen Buprenorphine Urine Qualitative [id number], Ethanol Urine Qualitative [id number], Methadone Urine Qualitative [id number], Oxycodone Urine Qualitative [id number], Creatinine Urine Random [id number]  - Drug Confirmation Panel Urine with Creat  - buprenorphine HCl-naloxone HCl (SUBOXONE) 2-0.5 MG per film; Dissolve 1 film under your tongue per provider instructions. May add 1 film every 1-2 hours as needed for withdrawal or cravings. Max 4 films per day.  - Follow-up 5/16/2025    Hyperlipidemia LDL goal <100  Recent lipid screening with severe hypertriglyceridemia, history of moderate to severe mixed hyperlipidemia.  Need to repeat lipid profile when fasting, unsure of impacts of kratom on lipid metabolism so would also like to recheck once he is off of kratom.    Normocytic anemia  Normocytic anemia, mild.  Blood smear 4/25/2025.  Screening nutritional deficiencies today.  Also unsure of impact on kratom on hematopoiesis.  Will continue to monitor.  No history of blood loss.  - Vitamin B12; Future  - Folate; Future  - Iron and iron binding capacity; Future  - Ferritin; Future  - TSH with free T4 reflex; Future    I spent a total of 33 minutes  on the day of the visit.   Time spent by me today doing chart review, history and exam, documentation and further activities per the note    The longitudinal plan of care for the diagnosis(es)/condition(s) as documented were addressed during this visit. Due to the added complexity in care, I will continue to support Tawanda Ramos in the subsequent management and with ongoing continuity of care.    Follow-up 1 week.    Subjective   Tawanda is a 34 year old, presenting for the following health issues:  Recheck Medication        5/7/2025    10:45 AM   Additional Questions   Roomed by Dino Rocha   Accompanied by None         5/7/2025    10:45 AM   Patient Reported Additional Medications   Patient reports taking the following new medications None     HPI        Current Narcotic Use/History:  Which opioid(s) are you currently using, that are not already on your med list?: Kratom  How do you use your drug of choice? Kratom extracts - 7OH  What is your estimated total dose (mg if pills, grams of heroin) per day? Pill form - upward of 100mg per day  When did you last use? Yesterday   Have you ever tried to quit on your own? YES- temporary success  What have you done to try quiting in the past? Has been prescribed 2 Suboxone tapers in the past - 8/2mg #14 films - seems to have tolerated both tapers quite well in regards to withdrawal symptoms  -but eventually returned to use due to boredom   What was the longest period of time you have been sober from opioids/kratom?: a couple of months  When and how did you start using opioids/kratom? Less than 1 year - started to enhance job performance - slowly developed tolerance and withdrawal symptoms when trying to stop using the Kratom      Does admit to taking Gabapentin and Valium prior to his last appointment - which correlates with his UDS results from that day    Declined Narcan RX.      Other Substance/Psychiatric History:  Have you been struggling with any other mental  health symptoms?: Yes- Anxiety, Depression  Any other drug use other than opioids?: (!) CANNABIS PRODUCTS and (!) KRATOM  - THC gummies for sleep  Do you struggle with any other addictive behaviors (sex, gambling, internet, shopping, TV etc): None    Social History  Housing status: alone  Employment status: Employed full time- [employer]   Relationship status: Single  Children: no children  Legal: None   Insurance needs: None  Contact information up to date? Yes    Family History:  Does anyone in your family have a history of a use disorder? YES- grandparents - possibly ETOH     PHQ Score:      5/21/2024     8:36 AM 12/11/2024     8:20 AM 4/28/2025    10:11 AM   PHQ   PHQ-9 Total Score 4 8  11    Q9: Thoughts of better off dead/self-harm past 2 weeks Not at all Not at all Not at all       Patient-reported     GAD7 Score:       10/3/2022     8:36 AM 12/11/2024     8:22 AM 4/28/2025    10:12 AM   SHIVANI-7 SCORE   Total Score  5 (mild anxiety) 3 (minimal anxiety)   Total Score 2 5  3        Patient-reported     {  PDMP Review         Value Time User    State PDMP site checked  Yes 4/28/2025 11:00 AM Bandar Heath MD            Review of Systems  Constitutional, HEENT, cardiovascular, pulmonary, gi and gu systems are negative, except as otherwise noted.      Objective    /76 (BP Location: Right arm, Patient Position: Sitting, Cuff Size: Adult Large)   Pulse 86   Temp 97.5  F (36.4  C) (Tympanic)   Resp 14   Wt 117 kg (257 lb 14.4 oz)   SpO2 97%   BMI 38.09 kg/m    Body mass index is 38.09 kg/m .  Physical Exam  Vitals reviewed.   Constitutional:       Appearance: Normal appearance.   HENT:      Head: Normocephalic and atraumatic.   Musculoskeletal:         General: Normal range of motion.   Skin:     General: Skin is warm and dry.   Neurological:      General: No focal deficit present.      Mental Status: He is alert and oriented to person, place, and time.   Psychiatric:         Mood and Affect: Mood  normal.         Behavior: Behavior normal.        Results for orders placed or performed in visit on 05/07/25 (from the past 24 hours)   Urine Drug Screen Buprenorphine Urine Qualitative [id number], Ethanol Urine Qualitative RHC120, Methadone Urine Qualitative [id number], Oxycodone Urine Qualitative [id number], Creatinine Urine Random [id number]    Narrative    The following orders were created for panel order Urine Drug Screen Buprenorphine Urine Qualitative [id number], Ethanol Urine Qualitative [id number], Methadone Urine Qualitative [id number], Oxycodone Urine Qualitative [id number], Creatinine Urine Random [id number].  Procedure                               Abnormality         Status                     ---------                               -----------         ------                     Urine Drug Screen Panel[[id number]]     Abnormal            Final result               Buprenorphine Urine, Qu...[[id number]]  Normal              Final result               Ethanol urine[[id number]]               Normal              Final result               Methadone Qual Urine[[id number]]        Normal              Final result               Oxycodone Urine, Qualit...[[id number]]  Normal              Final result               Creatinine random urine[[id number]]                         Final result                 Please view results for these tests on the individual orders.   Urine Drug Screen Panel   Result Value Ref Range    Amphetamines Urine Screen Negative Screen Negative    Barbituates Urine Screen Negative Screen Negative    Benzodiazepine Urine Screen Positive (A) Screen Negative    Cannabinoids Urine Screen Positive (A) Screen Negative    Cocaine Urine Screen Negative Screen Negative    Fentanyl Qual Urine Screen Negative Screen Negative    Opiates Urine Screen Negative Screen Negative    PCP Urine Screen Negative Screen Negative   Buprenorphine Urine, Qualitative   Result Value Ref Range    Buprenorphine Qual Urine Screen Negative Screen  Negative   Ethanol urine   Result Value Ref Range    Ethanol Urine Screen Negative Screen Negative   Methadone Qual Urine   Result Value Ref Range    Methadone Urine Screen Negative Screen Negative   Oxycodone Urine, Qualitative   Result Value Ref Range    Oxycodone Urine Screen Negative Screen Negative   Creatinine random urine   Result Value Ref Range    Creatinine Urine mg/dL 136.7 mg/dL   Drug Confirmation Panel Urine with Creat    Narrative    The following orders were created for panel order Drug Confirmation Panel Urine with Creat.  Procedure                               Abnormality         Status                     ---------                               -----------         ------                     Urine Drug Confirmation...[[id number]]                      In process                 Urine Creatinine for Dr...[[id number]]                      Final result                 Please view results for these tests on the individual orders.   Urine Creatinine for Drug Screen Panel   Result Value Ref Range    Creatinine Urine for Drug Screen 135 mg/dL           Signed Electronically by: Bandar Heath MD  {Email feedback regarding this note to [email or web address]   :[id number]}

## 2025-05-15 ENCOUNTER — PATIENT OUTREACH (OUTPATIENT)
Dept: CARE COORDINATION | Facility: OTHER | Age: 35
End: 2025-05-15

## 2025-05-15 NOTE — PROGRESS NOTES
Clinic Care Coordination Contact  Care Team Conversations    RN CC sent Tawanda a MyChart message this date reminding him of his appointment tomorrow and to arrive at 10:45AM.    Julia Muhammad RN-BSN, [facility] Care Coordinator  [phone]

## 2025-05-15 NOTE — PROGRESS NOTES
Assessment & Plan     Substance use  Started on Suboxone for kratom extract dependence 5/7.  Has been able to wean off kratom in the past with short Suboxone bursts.  Discussed concerns about relapsing with short-term therapy.  Returns today, has remained abstinent from kratom use.  Initially Suboxone up to 8 mg for the first few days, has since tapered down and now at 0.5 mg twice daily.  Still desires to taper off the Suboxone.  Discussed risk of relapse with short-term MOUD and encouraged him to reach out if cravings return.  - Visit in conjunction with RN CC; appreciate assistance  - MAT contract signed 5/7/2025  - PDMP reviewed  - Urine Drug Screen Buprenorphine Urine Qualitative [id number], Ethanol Urine Qualitative [id number], Methadone Urine Qualitative [id number], Oxycodone Urine Qualitative [id number], Creatinine Urine Random [id number]  - Has remaining Suboxone for continued taper    I spent a total of 24 minutes on the day of the visit.   Time spent by me today doing chart review, history and exam, documentation and further activities per the note    The longitudinal plan of care for the diagnosis(es)/condition(s) as documented were addressed during this visit. Due to the added complexity in care, I will continue to support Tawanda Ramos in the subsequent management and with ongoing continuity of care.    Follow-up 2 to 3 months to reassess hyperlipidemia, normocytic anemia, or sooner as needed.      Subjective   Tawanda is a 34 year old, presenting for the following health issues:  Recheck Medication        5/16/2025    10:40 AM   Additional Questions   Roomed by Dino Rocha   Accompanied by None         5/16/2025    10:40 AM   Patient Reported Additional Medications   Patient reports taking the following new medications None     HPI        He is currently taking 0.5 mg of buprenorphine 2 times daily.  - planning on going down to 0.5mg daily tonight - tapering down 0.25mg and then 0.125mg  after that   Last  fill 5.7.25 #36.    Status Since Last Visit:  Have you used any opioids since your last visit?: no use since last visit  Do you feel that your dose of suboxone is too high or too low? Adequate  Have there been cravings for opioids? No   Any withdrawal symptoms? None    Any side effects from the medication? None  Any alcohol use? Little bit - few drinks a few nights a week - mostly beer   Any other recreational drug use? None    Precipitating Factors:  Triggers have been: non-existent   Other Supports:  Do you attend counseling or meet with a therapist? No  Do you attend NA or AA meetings? No  Do you have/meet with a sponsor? No  Family and support systems have been: Helpful  What other goals have you been working on (job, family, relationships, etc)? Continues to work full time  Was able to initiate MOUD successfully   Started with 2mg and titrated up to 8mg for the few couple of days and then started tapering down  Sleep is better  THC gummies for sleep   Still staying at parents for now   States he feels better overall   Concerned about post acute withdrawal symptoms - educated on comfort meds          5/21/2024     8:36 AM 12/11/2024     8:20 AM 4/28/2025    10:11 AM   PHQ-9 SCORE   PHQ-9 Total Score MyChart 4 (Minimal depression) 8 (Mild depression) 11 (Moderate depression)   PHQ-9 Total Score 4 8  11        Patient-reported           10/3/2022     8:36 AM 12/11/2024     8:22 AM 4/28/2025    10:12 AM   SHIVANI-7 SCORE   Total Score  5 (mild anxiety) 3 (minimal anxiety)   Total Score 2 5  3        Patient-reported     PDMP Review         Value Time User    State PDMP site checked  Yes 4/28/2025 11:00 AM Bandar Heath MD            Review of Systems  Constitutional, HEENT, cardiovascular, pulmonary, gi and gu systems are negative, except as otherwise noted.      Objective    /84 (BP Location: Right arm, Patient Position: Sitting, Cuff Size: Adult Large)   Pulse 86   Temp 97.9  F (36.6  C) (Tympanic)   Resp  14   Wt 114.5 kg (252 lb 6.4 oz)   SpO2 100%   BMI 37.27 kg/m    Body mass index is 37.27 kg/m .  Physical Exam  Vitals reviewed.   Constitutional:       Appearance: Normal appearance.   HENT:      Head: Normocephalic and atraumatic.   Musculoskeletal:         General: Normal range of motion.   Skin:     General: Skin is warm and dry.   Neurological:      General: No focal deficit present.      Mental Status: He is alert and oriented to person, place, and time.   Psychiatric:         Mood and Affect: Mood normal.         Behavior: Behavior normal.            Results for orders placed or performed in visit on 05/16/25 (from the past 24 hours)   Urine Drug Screen Buprenorphine Urine Qualitative [id number], Ethanol Urine Qualitative [id number], Methadone Urine Qualitative [id number], Oxycodone Urine Qualitative [id number], Creatinine Urine Random [id number]    Narrative    The following orders were created for panel order Urine Drug Screen Buprenorphine Urine Qualitative [id number], Ethanol Urine Qualitative [id number], Methadone Urine Qualitative [id number], Oxycodone Urine Qualitative [id number], Creatinine Urine Random [id number].  Procedure                               Abnormality         Status                     ---------                               -----------         ------                     Urine Drug Screen Panel[[id number]]     Abnormal            Final result               Buprenorphine Urine, Qu...[[id number]]  Normal              Final result               Ethanol urine[[id number]]               Normal              Final result               Methadone Qual Urine[[id number]]        Normal              Final result               Oxycodone Urine, Qualit...[[id number]]  Normal              Final result               Creatinine random urine[[id number]]                         Final result                 Please view results for these tests on the individual orders.   Urine Drug Screen Panel   Result Value Ref Range    Amphetamines  Urine Screen Negative Screen Negative    Barbituates Urine Screen Negative Screen Negative    Benzodiazepine Urine Screen Positive (A) Screen Negative    Cannabinoids Urine Screen Positive (A) Screen Negative    Cocaine Urine Screen Negative Screen Negative    Fentanyl Qual Urine Screen Negative Screen Negative    Opiates Urine Screen Negative Screen Negative    PCP Urine Screen Negative Screen Negative   Buprenorphine Urine, Qualitative   Result Value Ref Range    Buprenorphine Qual Urine Screen Negative Screen Negative   Ethanol urine   Result Value Ref Range    Ethanol Urine Screen Negative Screen Negative   Methadone Qual Urine   Result Value Ref Range    Methadone Urine Screen Negative Screen Negative   Oxycodone Urine, Qualitative   Result Value Ref Range    Oxycodone Urine Screen Negative Screen Negative   Creatinine random urine   Result Value Ref Range    Creatinine Urine mg/dL 79.3 mg/dL           Signed Electronically by: Bandar Heath MD

## 2025-05-16 ENCOUNTER — OFFICE VISIT (OUTPATIENT)
Dept: FAMILY MEDICINE | Facility: OTHER | Age: 35
End: 2025-05-16
Attending: STUDENT IN AN ORGANIZED HEALTH CARE EDUCATION/TRAINING PROGRAM
Payer: COMMERCIAL

## 2025-05-16 ENCOUNTER — RESULTS FOLLOW-UP (OUTPATIENT)
Dept: FAMILY MEDICINE | Facility: OTHER | Age: 35
End: 2025-05-16

## 2025-05-16 ENCOUNTER — APPOINTMENT (OUTPATIENT)
Dept: LAB | Facility: OTHER | Age: 35
End: 2025-05-16
Payer: COMMERCIAL

## 2025-05-16 VITALS
DIASTOLIC BLOOD PRESSURE: 84 MMHG | BODY MASS INDEX: 37.27 KG/M2 | HEART RATE: 86 BPM | RESPIRATION RATE: 14 BRPM | WEIGHT: 252.4 LBS | OXYGEN SATURATION: 100 % | SYSTOLIC BLOOD PRESSURE: 130 MMHG | TEMPERATURE: 97.9 F

## 2025-05-16 DIAGNOSIS — F19.90 SUBSTANCE USE: Primary | ICD-10-CM

## 2025-05-16 LAB
AMPHETAMINES UR QL SCN: ABNORMAL
BARBITURATES UR QL SCN: ABNORMAL
BENZODIAZ UR QL SCN: ABNORMAL
BUPRENORPHINE UR QL: NORMAL
BZE UR QL SCN: ABNORMAL
CANNABINOIDS UR QL SCN: ABNORMAL
CREAT UR-MCNC: 79.3 MG/DL
ETHANOL UR QL SCN: NORMAL
FENTANYL UR QL: ABNORMAL
METHADONE UR QL SCN: NORMAL
OPIATES UR QL SCN: ABNORMAL
OXYCODONE UR QL: NORMAL
PCP QUAL URINE (ROCHE): ABNORMAL

## 2025-05-16 ASSESSMENT — PAIN SCALES - GENERAL: PAINLEVEL_OUTOF10: NO PAIN (0)

## 2025-05-27 ENCOUNTER — MYC REFILL (OUTPATIENT)
Dept: FAMILY MEDICINE | Facility: OTHER | Age: 35
End: 2025-05-27

## 2025-05-27 DIAGNOSIS — F33.42 RECURRENT MAJOR DEPRESSIVE DISORDER, IN FULL REMISSION: ICD-10-CM

## 2025-05-27 DIAGNOSIS — F41.1 GAD (GENERALIZED ANXIETY DISORDER): ICD-10-CM

## 2025-05-27 RX ORDER — CLONAZEPAM 0.5 MG/1
0.5 TABLET ORAL 2 TIMES DAILY PRN
Qty: 60 TABLET | Refills: 0 | Status: SHIPPED | OUTPATIENT
Start: 2025-05-28

## 2025-05-27 NOTE — TELEPHONE ENCOUNTER
Klonopin 0.5 mg       Last Written Prescription Date:  04/28/2025  Last Fill Quantity: 60,   # refills: 0  Last Office Visit: 05/16/2025  Future Office visit:    Next 5 appointments (look out 90 days)      Aug 08, 2025 10:00 AM  (Arrive by 9:45 AM)  Provider Visit with Bandar Heath MD  [facility] [place] ([facility] [place] ) [address]  [place] [address]  [phone]

## 2025-05-28 RX ORDER — CLONAZEPAM 0.5 MG/1
0.5 TABLET ORAL 2 TIMES DAILY PRN
Qty: 60 TABLET | Refills: 0 | OUTPATIENT
Start: 2025-05-28

## 2025-06-02 DIAGNOSIS — I10 PRIMARY HYPERTENSION: ICD-10-CM

## 2025-06-02 RX ORDER — AMLODIPINE BESYLATE 10 MG/1
10 TABLET ORAL DAILY
Qty: 90 TABLET | Refills: 1 | Status: SHIPPED | OUTPATIENT
Start: 2025-06-02

## 2025-06-09 ENCOUNTER — MYC REFILL (OUTPATIENT)
Dept: FAMILY MEDICINE | Facility: OTHER | Age: 35
End: 2025-06-09

## 2025-06-09 DIAGNOSIS — F19.90 SUBSTANCE USE: ICD-10-CM

## 2025-06-09 NOTE — TELEPHONE ENCOUNTER
Patient comment: Fabricio Heath/Julia. You mentioned during my last visit that if I felt I needed more suboxone, to reach out. I did very well for the past ~month since that visit, but cravings have since resurfaced and I think that I may need to put more thought into this and perhaps continue a low maintenance dose. Can this prescription be filled again? If so, is this something I could continue and reassess the situation during my next appointment scheduled in early August? Let me know my options. Thanks!         buprenorphine HCl-naloxone HCl (SUBOXONE) 2-0.5 MG per film       Last Written Prescription Date:  5/7/25  Last Fill Quantity: 36,   # refills: 0  Last Office Visit: 5/16/25  Future Office visit:    Next 5 appointments (look out 90 days)      Aug 08, 2025 10:00 AM  (Arrive by 9:45 AM)  Provider Visit with Bandar Heath MD  [facility] [place] ([facility] [place] ) [address]  [place] [address]  [phone]             Routing refill request to provider for review/approval because:  Drug not on the [facility], P or  Health refill protocol or controlled substance

## 2025-06-09 NOTE — TELEPHONE ENCOUNTER
Last office visit 5.16.25   Returns today, has remained abstinent from kratom use.  Initially Suboxone up to 8 mg for the first few days, has since tapered down and now at 0.5 mg twice daily.  Still desires to taper off the Suboxone.  Discussed risk of relapse with short-term MOUD and encouraged him to reach out if cravings return.      Plan at that time was for Tawanda to reach out if he is wanting another refill of Suboxone.      Willing to sign?  Last fill 5.7.25 #36.  Will get him scheduled for a sooner appointment with Dr. Heath.

## 2025-06-10 RX ORDER — BUPRENORPHINE AND NALOXONE 2; .5 MG/1; MG/1
FILM, SOLUBLE BUCCAL; SUBLINGUAL
Qty: 36 FILM | Refills: 0 | Status: SHIPPED | OUTPATIENT
Start: 2025-06-10

## 2025-06-25 ENCOUNTER — MYC REFILL (OUTPATIENT)
Dept: FAMILY MEDICINE | Facility: OTHER | Age: 35
End: 2025-06-25

## 2025-06-25 DIAGNOSIS — F33.42 RECURRENT MAJOR DEPRESSIVE DISORDER, IN FULL REMISSION: ICD-10-CM

## 2025-06-25 DIAGNOSIS — F41.1 GAD (GENERALIZED ANXIETY DISORDER): ICD-10-CM

## 2025-06-25 RX ORDER — CLONAZEPAM 0.5 MG/1
0.5 TABLET ORAL 2 TIMES DAILY PRN
Qty: 60 TABLET | Refills: 0 | Status: SHIPPED | OUTPATIENT
Start: 2025-06-27

## 2025-06-25 NOTE — TELEPHONE ENCOUNTER
clonazePAM (KLONOPIN) 0.5 MG tablet       Last Written Prescription Date:  5/28/25  Last Fill Quantity: 60,   # refills: 0  Last Office Visit: 5/16/25  Future Office visit:    Next 5 appointments (look out 90 days)      Aug 08, 2025 10:00 AM  (Arrive by 9:45 AM)  Provider Visit with Bandar Heath MD  [facility] ([facility] [place] ) [address]  [place] [address]  [phone]             Routing refill request to provider for review/approval because:  Drug not on the [facility], UMP or  Health refill protocol or controlled substance

## 2025-06-26 DIAGNOSIS — F33.42 RECURRENT MAJOR DEPRESSIVE DISORDER, IN FULL REMISSION: ICD-10-CM

## 2025-06-26 DIAGNOSIS — F41.1 GAD (GENERALIZED ANXIETY DISORDER): ICD-10-CM

## 2025-06-26 RX ORDER — CLONAZEPAM 0.5 MG/1
0.5 TABLET ORAL 2 TIMES DAILY PRN
Qty: 60 TABLET | Refills: 0 | OUTPATIENT
Start: 2025-06-26

## 2025-07-24 ENCOUNTER — MYC REFILL (OUTPATIENT)
Dept: FAMILY MEDICINE | Facility: OTHER | Age: 35
End: 2025-07-24

## 2025-07-24 ENCOUNTER — MYC MEDICAL ADVICE (OUTPATIENT)
Dept: FAMILY MEDICINE | Facility: OTHER | Age: 35
End: 2025-07-24

## 2025-07-24 DIAGNOSIS — F19.90 SUBSTANCE USE: ICD-10-CM

## 2025-07-24 RX ORDER — BUPRENORPHINE AND NALOXONE 2; .5 MG/1; MG/1
FILM, SOLUBLE BUCCAL; SUBLINGUAL
Qty: 36 FILM | Refills: 0 | Status: SHIPPED | OUTPATIENT
Start: 2025-07-24

## 2025-07-24 NOTE — TELEPHONE ENCOUNTER
Suboxone      Last Written Prescription Date:  6.10.25  Last Fill Quantity: #36,   # refills: 0  Last Office Visit: 5.16.25  Future Office visit:    Next 5 appointments (look out 90 days)      Aug 08, 2025 10:00 AM  (Arrive by 9:45 AM)  Provider Visit with Bandar Heath MD  [facility] ([facility] ) [address] [place] [address]  [place] [address]  [phone]             Routing refill request to provider for review/approval because:  Drug not on the [facility], UMP or [facility] refill protocol or controlled substance

## 2025-07-28 ENCOUNTER — MYC REFILL (OUTPATIENT)
Dept: FAMILY MEDICINE | Facility: OTHER | Age: 35
End: 2025-07-28

## 2025-07-28 DIAGNOSIS — F33.42 RECURRENT MAJOR DEPRESSIVE DISORDER, IN FULL REMISSION: ICD-10-CM

## 2025-07-28 DIAGNOSIS — F41.1 GAD (GENERALIZED ANXIETY DISORDER): ICD-10-CM

## 2025-07-28 RX ORDER — CLONAZEPAM 0.5 MG/1
0.5 TABLET ORAL 2 TIMES DAILY PRN
Qty: 60 TABLET | Refills: 0 | Status: SHIPPED | OUTPATIENT
Start: 2025-07-28

## 2025-07-28 NOTE — TELEPHONE ENCOUNTER
clonazePAM (KLONOPIN) 0.5 MG tablet       Last Written Prescription Date:  6/27/25  Last Fill Quantity: 60,   # refills: 0  Last Office Visit: 5/16/25  Future Office visit:    Next 5 appointments (look out 90 days)      Aug 08, 2025 10:00 AM  (Arrive by 9:45 AM)  Provider Visit with Bandar Heath MD  [facility] [place] ([facility] [place] ) [address]  [place] [address]  [phone]             Routing refill request to provider for review/approval because:

## 2025-07-29 NOTE — TELEPHONE ENCOUNTER
Pharmacy wanting clarification on why patient is on buprenorphine and benzodiazipine prior to filling.   PCP to advise.

## 2025-07-30 RX ORDER — CLONAZEPAM 0.5 MG/1
0.5 TABLET ORAL 2 TIMES DAILY PRN
Qty: 60 TABLET | Refills: 0 | OUTPATIENT
Start: 2025-07-30

## 2025-07-30 NOTE — TELEPHONE ENCOUNTER
Spoke with pharmacy and updated- no other information needed. They filled med for patient. Will refuse this request.

## 2025-08-04 ASSESSMENT — PATIENT HEALTH QUESTIONNAIRE - PHQ9
SUM OF ALL RESPONSES TO PHQ QUESTIONS 1-9: 5
SUM OF ALL RESPONSES TO PHQ QUESTIONS 1-9: 5
10. IF YOU CHECKED OFF ANY PROBLEMS, HOW DIFFICULT HAVE THESE PROBLEMS MADE IT FOR YOU TO DO YOUR WORK, TAKE CARE OF THINGS AT HOME, OR GET ALONG WITH OTHER PEOPLE: SOMEWHAT DIFFICULT

## 2025-08-05 ENCOUNTER — OFFICE VISIT (OUTPATIENT)
Dept: FAMILY MEDICINE | Facility: OTHER | Age: 35
End: 2025-08-05
Attending: STUDENT IN AN ORGANIZED HEALTH CARE EDUCATION/TRAINING PROGRAM
Payer: COMMERCIAL

## 2025-08-05 VITALS
DIASTOLIC BLOOD PRESSURE: 78 MMHG | BODY MASS INDEX: 38.7 KG/M2 | WEIGHT: 261.3 LBS | OXYGEN SATURATION: 97 % | HEART RATE: 59 BPM | TEMPERATURE: 98.6 F | SYSTOLIC BLOOD PRESSURE: 136 MMHG | RESPIRATION RATE: 16 BRPM | HEIGHT: 69 IN

## 2025-08-05 DIAGNOSIS — K85.20 ALCOHOL-INDUCED ACUTE PANCREATITIS WITHOUT INFECTION OR NECROSIS: ICD-10-CM

## 2025-08-05 DIAGNOSIS — E78.5 HYPERLIPIDEMIA LDL GOAL <100: ICD-10-CM

## 2025-08-05 DIAGNOSIS — Z09 HOSPITAL DISCHARGE FOLLOW-UP: Primary | ICD-10-CM

## 2025-08-05 DIAGNOSIS — F19.90 SUBSTANCE USE: ICD-10-CM

## 2025-08-05 DIAGNOSIS — F41.1 GAD (GENERALIZED ANXIETY DISORDER): ICD-10-CM

## 2025-08-05 DIAGNOSIS — F33.42 RECURRENT MAJOR DEPRESSIVE DISORDER, IN FULL REMISSION: ICD-10-CM

## 2025-08-05 LAB
ALBUMIN SERPL BCG-MCNC: 4.4 G/DL (ref 3.5–5.2)
ALP SERPL-CCNC: 46 U/L (ref 40–150)
ALT SERPL W P-5'-P-CCNC: 40 U/L (ref 0–70)
AST SERPL W P-5'-P-CCNC: 32 U/L (ref 0–45)
BILIRUB DIRECT SERPL-MCNC: 0.25 MG/DL (ref 0–0.3)
BILIRUB SERPL-MCNC: 0.6 MG/DL
CANNABINOIDS UR QL SCN: ABNORMAL
CHOLEST SERPL-MCNC: 156 MG/DL
CREAT UR-MCNC: 90 MG/DL
FASTING STATUS PATIENT QL REPORTED: YES
HDLC SERPL-MCNC: 44 MG/DL
LDLC SERPL CALC-MCNC: 77 MG/DL
NONHDLC SERPL-MCNC: 112 MG/DL
PROT SERPL-MCNC: 6.8 G/DL (ref 6.4–8.3)
TRIGL SERPL-MCNC: 173 MG/DL

## 2025-08-05 RX ORDER — BUPRENORPHINE AND NALOXONE 2; .5 MG/1; MG/1
1 FILM, SOLUBLE BUCCAL; SUBLINGUAL DAILY
Qty: 30 FILM | Refills: 0 | Status: SHIPPED | OUTPATIENT
Start: 2025-08-05

## 2025-08-05 ASSESSMENT — PAIN SCALES - GENERAL: PAINLEVEL_OUTOF10: NO PAIN (0)

## 2025-08-09 LAB — 7AMINOCLONAZEPAM UR QL CFM: PRESENT

## 2025-08-27 ENCOUNTER — MYC REFILL (OUTPATIENT)
Dept: FAMILY MEDICINE | Facility: OTHER | Age: 35
End: 2025-08-27

## 2025-08-27 DIAGNOSIS — F41.1 GAD (GENERALIZED ANXIETY DISORDER): ICD-10-CM

## 2025-08-27 DIAGNOSIS — F33.42 RECURRENT MAJOR DEPRESSIVE DISORDER, IN FULL REMISSION: ICD-10-CM

## 2025-08-27 RX ORDER — CLONAZEPAM 0.5 MG/1
0.5 TABLET ORAL 2 TIMES DAILY PRN
Qty: 60 TABLET | Refills: 0 | Status: SHIPPED | OUTPATIENT
Start: 2025-08-28